# Patient Record
Sex: FEMALE | Race: WHITE | NOT HISPANIC OR LATINO | Employment: PART TIME | URBAN - METROPOLITAN AREA
[De-identification: names, ages, dates, MRNs, and addresses within clinical notes are randomized per-mention and may not be internally consistent; named-entity substitution may affect disease eponyms.]

---

## 2017-03-29 ENCOUNTER — OFFICE VISIT (OUTPATIENT)
Dept: MEDICAL GROUP | Age: 22
End: 2017-03-29
Payer: OTHER GOVERNMENT

## 2017-03-29 VITALS
HEIGHT: 67 IN | WEIGHT: 146.8 LBS | TEMPERATURE: 97.5 F | SYSTOLIC BLOOD PRESSURE: 100 MMHG | DIASTOLIC BLOOD PRESSURE: 64 MMHG | OXYGEN SATURATION: 97 % | BODY MASS INDEX: 23.04 KG/M2 | HEART RATE: 93 BPM

## 2017-03-29 DIAGNOSIS — Z23 NEED FOR MENINGOCOCCAL VACCINATION: ICD-10-CM

## 2017-03-29 DIAGNOSIS — Z13.29 SCREENING FOR ENDOCRINE DISORDER: ICD-10-CM

## 2017-03-29 DIAGNOSIS — R09.81 NASAL CONGESTION: ICD-10-CM

## 2017-03-29 DIAGNOSIS — Z13.1 SCREENING FOR DIABETES MELLITUS: ICD-10-CM

## 2017-03-29 DIAGNOSIS — Z77.21 EXPOSURE TO BLOOD: ICD-10-CM

## 2017-03-29 DIAGNOSIS — R10.9 ABDOMINAL DISCOMFORT: ICD-10-CM

## 2017-03-29 DIAGNOSIS — Z83.3 FAMILY HISTORY OF DIABETES MELLITUS (DM): ICD-10-CM

## 2017-03-29 DIAGNOSIS — Z83.42 FAMILY HISTORY OF HYPERCHOLESTEROLEMIA: ICD-10-CM

## 2017-03-29 DIAGNOSIS — Z13.220 SCREENING, LIPID: ICD-10-CM

## 2017-03-29 PROCEDURE — 99214 OFFICE O/P EST MOD 30 MIN: CPT | Mod: 25 | Performed by: FAMILY MEDICINE

## 2017-03-29 PROCEDURE — 90621 MENB-FHBP VACC 2/3 DOSE IM: CPT | Performed by: FAMILY MEDICINE

## 2017-03-29 PROCEDURE — 90471 IMMUNIZATION ADMIN: CPT | Performed by: FAMILY MEDICINE

## 2017-03-29 NOTE — PROGRESS NOTES
"SUBJECTIVE:        Chief Complaint   Patient presents with   • Orders Needed     Blood work ordered       HPI:     Michel Angelo is a 21 y.o. female here for request of lab work. She is here with her sister.  States her mother and father recently had some lab tests and were noted to have high cholesterol. States she was advised to have her lab tests are completed due to a significant family history. Notes having a family history of high cholesterol as well as diabetes.    States she also had some stomach issues for which she saw urgent care. She later saw GI and was recommended to go on a gluten-free diet. Initially had stomach pains that improved after going on the gluten-free diet. States she felt like she was going to vomit with eating. No longer having any significant symptoms.    She is also concerned about a prior blood exposure in July 2016. Denies being sexually active. States she had some blood on her hands and would like some blood tests ordered as she has been concerned. States her boyfriend denied any specific type of disease which she should be concerned about.  She has been otherwise healthy without any significant symptoms.    States she has woken up congested in the mornings for the past few months but does improve throughout the day.    States that she had some prior lab completed as her brother had a tumor.    ROS:  Denies any recent fevers or chills. No nausea or vomiting. No diarrhea. No chest pains or shortness of breath. No lower extremity edema.    Current Outpatient Prescriptions on File Prior to Visit   Medication Sig Dispense Refill   • Probiotic Product (PROBIOTIC DAILY PO) Take  by mouth.       No current facility-administered medications on file prior to visit.       Allergies   Allergen Reactions   • Gluten Meal        Past Medical History   Diagnosis Date   • NEGATIVE HISTORY OF          OBJECTIVE:   /64 mmHg  Pulse 93  Temp(Src) 36.4 °C (97.5 °F)  Ht 1.702 m (5' 7.01\")  " Wt 66.588 kg (146 lb 12.8 oz)  BMI 22.99 kg/m2  SpO2 97%  LMP 03/16/2017  Breastfeeding? No  General: Well-developed well-nourished female, no acute distress  HEENT: eyes clear, nose clear  Neck: supple, no lymphadenopathy- cervical or supraclavicular, no thyromegaly  Cardiovascular: regular rate and rhythm, no murmurs, gallops, rubs  Lungs: clear to auscultation bilaterally, no wheezes, crackles, or rhonchi  Abdomen: +bowel sounds, soft, nontender, nondistended, no rebound, no guarding, no hepatosplenomegaly  Extremities: no cyanosis, clubbing, edema  Skin: Warm and dry  Psych: appropriate mood and affect        ASSESSMENT/PLAN:    21 year old female.     1. Abdominal discomfort-improved on gluten-free diet. Has seen GI. Stable overall. Monitor and follow up as needed.     2. Nasal congestion- may be associated with allergies. Notices mostly in the morning. Consider some reflux. Continue to modify diet. May try OTC nasal sprays as needed.     3. Exposure to blood  CBC WITH DIFFERENTIAL    HIV ANTIBODIES    HEP C VIRUS ANTIBODY    HEP B SURFACE ANTIGEN    HEP B SURFACE AB   4. Screening, lipid  LIPID PROFILE   5. Screening for diabetes mellitus  COMP METABOLIC PANEL   6. Screening for endocrine disorder  TSH   7. Need for meningococcal vaccination  MENINGOCOCCAL VACCINE (IM) GROUP B   8. Family history of hypercholesterolemia     9. Family history of diabetes mellitus (DM)     Counseled on appropriate diet-recommend low cholesterol, high fiber, and low sugar diet. Recommended routine exercise.   Recommend routine lab work.     Return if symptoms worsen or fail to improve.   Recommend follow-up for routine annual well exam.  Recommend start Pap smear age 23-year-old and screen STD when becomes sexually active.    > 25 minutes spent with this patient of which > 15 minutes spent on counseling and coordination of care.

## 2017-03-29 NOTE — MR AVS SNAPSHOT
"        Michel Angelo   3/29/2017 3:40 PM   Office Visit   MRN: [id number]    Department:  [address] [place] [address]   Dept Phone:  [phone]    Description:  Female : 1995   Provider:  Gisselle Mcneil M.D.           Reason for Visit     Orders Needed Blood work ordered      Allergies as of 3/29/2017     Allergen Noted Reactions    Gluten Meal 2017         You were diagnosed with     Neuritis   [022916]       Exposure to blood   [449701]       Screening, lipid   [930197]       Screening for diabetes mellitus   [V77.1.ICD-9-CM]       Screening for endocrine disorder   [5858973]         Vital Signs     Blood Pressure Pulse Temperature Height Weight Body Mass Index    100/64 mmHg 93 36.4 °C (97.5 °F) 1.702 m (5' 7.01\") 66.588 kg (146 lb 12.8 oz) 22.99 kg/m2    Oxygen Saturation Last Menstrual Period Breastfeeding? Smoking Status          97% 2017 No Never Smoker         Basic Information     Date Of Birth Sex Race Ethnicity Preferred Language    1995 Female White Non- English      Your appointments     2017  4:45 PM   Non Provider 1 with [facility] [address])    [address] [place] [id number] [phone]           You will be receiving a confirmation call a few days before your appointment from our automated call confirmation system.              Problem List              ICD-10-CM Priority Class Noted - Resolved    No active medical problems [id number]   2012 - Present    Dysmenorrhea N94.6   2016 - Present    Family history of carcinoid tumor Z80.9   2016 - Present    Chronic constipation K59.09   2016 - Present    Neuritis- right scalp cutaneous x 5 days M79.2   2016 - Present      Health Maintenance        Date Due Completion Dates    IMM MENINGOCOCCAL VACCINE (MCV4) (1 of 1) 10/17/2013 ---    IMM DTaP/Tdap/Td Vaccine (6 - Td) 2020, 3/31/2004, 6/15/1999, 1998, 1998            "   Current Immunizations     Dtap Vaccine 3/31/2004, 6/15/1999, 2/25/1998, 1/16/1998    HPV Quadrivalent Vaccine (GARDASIL) 6/25/2013, 2/11/2013, 11/29/2012 11:40 AM    Hepatitis A Vaccine, Ped/Adol 5/25/2010, 3/31/2004    Hepatitis B Vaccine Non-Recombivax (Ped/Adol) 6/15/1999, 2/25/1998, 1995    Hib Vaccine (Prp-d) Historical Data 6/15/1999, 2/25/1998, 1/16/1998    IPV 3/31/2004, 6/15/1999, 2/25/1998, 1/16/1998    Influenza TIV (IM) 3/4/2016    Influenza Vaccine Quad Inj (Pf) 10/13/2016    MMR Vaccine 3/31/2004, 6/15/1999    Meningococcal Vaccine Serogroup B (Trumenba)  Incomplete    Tdap Vaccine 5/25/2010    Varicella Vaccine Live 6/15/1999      Below and/or attached are the medications your provider expects you to take. Review all of your home medications and newly ordered medications with your provider and/or pharmacist. Follow medication instructions as directed by your provider and/or pharmacist. Please keep your medication list with you and share with your provider. Update the information when medications are discontinued, doses are changed, or new medications (including over-the-counter products) are added; and carry medication information at all times in the event of emergency situations     Allergies:  GLUTEN MEAL - (reactions not documented)               Medications  Valid as of: March 29, 2017 -  4:51 PM    Generic Name Brand Name Tablet Size Instructions for use    Probiotic Product   Take  by mouth.        .                 Medicines prescribed today were sent to:     [facility] [address] [place] [address] (N), [place] - [address] [facility]    [address] [facility] (N) [place] [address]    Phone: [phone] Fax: [phone]    Open 24 Hours?: No      Medication refill instructions:       If your prescription bottle indicates you have medication refills left, it is not necessary to call your provider’s office. Please contact your pharmacy and they will refill your medication.    If your prescription  bottle indicates you do not have any refills left, you may request refills at any time through one of the following ways: The online [employer] system (except Urgent Care), by calling your provider’s office, or by asking your pharmacy to contact your provider’s office with a refill request. Medication refills are processed only during regular business hours and may not be available until the next business day. Your provider may request additional information or to have a follow-up visit with you prior to refilling your medication.   *Please Note: Medication refills are assigned a new Rx number when refilled electronically. Your pharmacy may indicate that no refills were authorized even though a new prescription for the same medication is available at the pharmacy. Please request the medicine by name with the pharmacy before contacting your provider for a refill.        Your To Do List     Future Labs/Procedures Complete By Expires    CBC WITH DIFFERENTIAL  As directed 3/29/2018    COMP METABOLIC PANEL  As directed 9/26/2017    HEP B SURFACE AB  As directed 9/26/2017    HEP B SURFACE ANTIGEN  As directed 3/29/2018    HEP C VIRUS ANTIBODY  As directed 3/29/2018    HIV ANTIBODIES  As directed 9/27/2017    LIPID PROFILE  As directed 9/26/2017    TSH  As directed 3/29/2018         [employer] Access Code: Activation code not generated  Current [employer] Status: Active

## 2017-03-30 PROBLEM — Z83.42 FAMILY HISTORY OF HYPERCHOLESTEROLEMIA: Status: ACTIVE | Noted: 2017-03-30

## 2017-03-30 PROBLEM — Z83.3 FAMILY HISTORY OF DIABETES MELLITUS (DM): Status: ACTIVE | Noted: 2017-03-30

## 2017-04-03 ENCOUNTER — HOSPITAL ENCOUNTER (OUTPATIENT)
Dept: LAB | Facility: MEDICAL CENTER | Age: 22
End: 2017-04-03
Attending: FAMILY MEDICINE
Payer: OTHER GOVERNMENT

## 2017-04-03 DIAGNOSIS — Z13.29 SCREENING FOR ENDOCRINE DISORDER: ICD-10-CM

## 2017-04-03 DIAGNOSIS — Z13.220 SCREENING, LIPID: ICD-10-CM

## 2017-04-03 DIAGNOSIS — Z13.1 SCREENING FOR DIABETES MELLITUS: ICD-10-CM

## 2017-04-03 DIAGNOSIS — Z77.21 EXPOSURE TO BLOOD: ICD-10-CM

## 2017-04-03 LAB
ALBUMIN SERPL BCP-MCNC: 4.8 G/DL (ref 3.2–4.9)
ALBUMIN/GLOB SERPL: 1.7 G/DL
ALP SERPL-CCNC: 47 U/L (ref 30–99)
ALT SERPL-CCNC: 15 U/L (ref 2–50)
ANION GAP SERPL CALC-SCNC: 4 MMOL/L (ref 0–11.9)
AST SERPL-CCNC: 21 U/L (ref 12–45)
BASOPHILS # BLD AUTO: 0.9 % (ref 0–1.8)
BASOPHILS # BLD: 0.05 K/UL (ref 0–0.12)
BILIRUB SERPL-MCNC: 0.5 MG/DL (ref 0.1–1.5)
BUN SERPL-MCNC: 21 MG/DL (ref 8–22)
CALCIUM SERPL-MCNC: 9.9 MG/DL (ref 8.5–10.5)
CHLORIDE SERPL-SCNC: 103 MMOL/L (ref 96–112)
CHOLEST SERPL-MCNC: 165 MG/DL (ref 100–199)
CO2 SERPL-SCNC: 29 MMOL/L (ref 20–33)
CREAT SERPL-MCNC: 0.97 MG/DL (ref 0.5–1.4)
EOSINOPHIL # BLD AUTO: 0.07 K/UL (ref 0–0.51)
EOSINOPHIL NFR BLD: 1.3 % (ref 0–6.9)
ERYTHROCYTE [DISTWIDTH] IN BLOOD BY AUTOMATED COUNT: 39.8 FL (ref 35.9–50)
GFR SERPL CREATININE-BSD FRML MDRD: >60 ML/MIN/1.73 M 2
GLOBULIN SER CALC-MCNC: 2.9 G/DL (ref 1.9–3.5)
GLUCOSE SERPL-MCNC: 86 MG/DL (ref 65–99)
HBV SURFACE AB SERPL IA-ACNC: <3.1 MIU/ML (ref 0–10)
HBV SURFACE AG SER QL: NEGATIVE
HCT VFR BLD AUTO: 44.1 % (ref 37–47)
HCV AB SER QL: NEGATIVE
HDLC SERPL-MCNC: 49 MG/DL
HGB BLD-MCNC: 14.7 G/DL (ref 12–16)
HIV 1+2 AB+HIV1 P24 AG SERPL QL IA: NON REACTIVE
IMM GRANULOCYTES # BLD AUTO: 0.01 K/UL (ref 0–0.11)
IMM GRANULOCYTES NFR BLD AUTO: 0.2 % (ref 0–0.9)
LDLC SERPL CALC-MCNC: 102 MG/DL
LYMPHOCYTES # BLD AUTO: 2.28 K/UL (ref 1–4.8)
LYMPHOCYTES NFR BLD: 42.5 % (ref 22–41)
MCH RBC QN AUTO: 29.8 PG (ref 27–33)
MCHC RBC AUTO-ENTMCNC: 33.3 G/DL (ref 33.6–35)
MCV RBC AUTO: 89.3 FL (ref 81.4–97.8)
MONOCYTES # BLD AUTO: 0.35 K/UL (ref 0–0.85)
MONOCYTES NFR BLD AUTO: 6.5 % (ref 0–13.4)
NEUTROPHILS # BLD AUTO: 2.61 K/UL (ref 2–7.15)
NEUTROPHILS NFR BLD: 48.6 % (ref 44–72)
NRBC # BLD AUTO: 0 K/UL
NRBC BLD AUTO-RTO: 0 /100 WBC
PLATELET # BLD AUTO: 226 K/UL (ref 164–446)
PMV BLD AUTO: 12.8 FL (ref 9–12.9)
POTASSIUM SERPL-SCNC: 4.4 MMOL/L (ref 3.6–5.5)
PROT SERPL-MCNC: 7.7 G/DL (ref 6–8.2)
RBC # BLD AUTO: 4.94 M/UL (ref 4.2–5.4)
SODIUM SERPL-SCNC: 136 MMOL/L (ref 135–145)
TRIGL SERPL-MCNC: 72 MG/DL (ref 0–149)
TSH SERPL DL<=0.005 MIU/L-ACNC: 2.09 UIU/ML (ref 0.3–3.7)
WBC # BLD AUTO: 5.4 K/UL (ref 4.8–10.8)

## 2017-04-03 PROCEDURE — 87340 HEPATITIS B SURFACE AG IA: CPT

## 2017-04-03 PROCEDURE — 84443 ASSAY THYROID STIM HORMONE: CPT

## 2017-04-03 PROCEDURE — 80061 LIPID PANEL: CPT

## 2017-04-03 PROCEDURE — 36415 COLL VENOUS BLD VENIPUNCTURE: CPT

## 2017-04-03 PROCEDURE — 86706 HEP B SURFACE ANTIBODY: CPT

## 2017-04-03 PROCEDURE — 87389 HIV-1 AG W/HIV-1&-2 AB AG IA: CPT

## 2017-04-03 PROCEDURE — 80053 COMPREHEN METABOLIC PANEL: CPT

## 2017-04-03 PROCEDURE — 85025 COMPLETE CBC W/AUTO DIFF WBC: CPT

## 2017-04-03 PROCEDURE — 86803 HEPATITIS C AB TEST: CPT

## 2017-06-01 ENCOUNTER — TELEPHONE (OUTPATIENT)
Dept: MEDICAL GROUP | Age: 22
End: 2017-06-01

## 2017-06-01 DIAGNOSIS — Z23 NEED FOR VACCINATION: Primary | ICD-10-CM

## 2017-06-14 ENCOUNTER — NON-PROVIDER VISIT (OUTPATIENT)
Dept: MEDICAL GROUP | Age: 22
End: 2017-06-14
Payer: OTHER GOVERNMENT

## 2017-06-14 DIAGNOSIS — Z23 NEED FOR VACCINATION: ICD-10-CM

## 2017-06-14 PROCEDURE — 90471 IMMUNIZATION ADMIN: CPT | Performed by: FAMILY MEDICINE

## 2017-06-14 PROCEDURE — 90621 MENB-FHBP VACC 2/3 DOSE IM: CPT | Performed by: FAMILY MEDICINE

## 2017-06-14 NOTE — PROGRESS NOTES
"Michel Angelo is a 21 y.o. female here for a non-provider visit for:   TRUMENBA (Men B) 2 of 3    Reason for immunization: Overdue/Provider Recommended  Immunization records indicate need for vaccine: Yes, confirmed with Epic and confirmed with [place] [facility]  Minimum interval has been met for this vaccine: Yes  ABN completed: Not Indicated    Order and dose verified by: RICHARD  VIS Dated  08/09/2016 was given to patient: Yes  All IAC Questionnaire questions were answered “No.\"    Patient tolerated injection and no adverse effects were observed or reported: Yes    Pt scheduled for next dose in series: No    "

## 2017-06-14 NOTE — MR AVS SNAPSHOT
Michel Angelo   2017 9:15 AM   Non-Provider Visit   MRN: [id number]    Department:  [address] [place] [address] [facility]   Dept Phone:  [phone]    Description:  Female : 1995   Provider:  DAMIEN WAITE MA           Reason for Visit     Immunizations Trumenba #2      Allergies as of 2017     Allergen Noted Reactions    Gluten Meal 2017         You were diagnosed with     Need for vaccination   [[id number]]         Vital Signs     Smoking Status                   Never Smoker            Basic Information     Date Of Birth Sex Race Ethnicity Preferred Language    1995 Female White Non- English      Your appointments     2017  9:15 AM   Non Provider 1 with DAMIEN WAITE MA   [facility] [address] [place] [address] ([facility])    [address] [place] [address] [phone]           You will be receiving a confirmation call a few days before your appointment from our automated call confirmation system.              Problem List              ICD-10-CM Priority Class Noted - Resolved    No active medical problems [id number]   2012 - Present    Dysmenorrhea N94.6   2016 - Present    Family history of carcinoid tumor Z80.9   2016 - Present    Chronic constipation K59.09   2016 - Present    Neuritis- right scalp cutaneous x 5 days M79.2   2016 - Present    Family history of hypercholesterolemia Z83.42   3/30/2017 - Present    Family history of diabetes mellitus (DM) Z83.3   3/30/2017 - Present      Health Maintenance        Date Due Completion Dates    IMM DTaP/Tdap/Td Vaccine (6 - Td) 2020, 3/31/2004, 6/15/1999, 1998, 1998            Current Immunizations     Dtap Vaccine 3/31/2004, 6/15/1999, 1998, 1998    HPV Quadrivalent Vaccine (GARDASIL) 2013, 2013, 2012 11:40 AM    Hepatitis A Vaccine, Ped/Adol 2010, 3/31/2004    Hepatitis B Vaccine Non-Recombivax (Ped/Adol) 6/15/1999, 1998, 1995    Hib  Vaccine (Prp-d) Historical Data 6/15/1999, 2/25/1998, 1/16/1998    IPV 3/31/2004, 6/15/1999, 2/25/1998, 1/16/1998    Influenza TIV (IM) 3/4/2016    Influenza Vaccine Quad Inj (Pf) 10/13/2016    MMR Vaccine 3/31/2004, 6/15/1999    Meningococcal Conjugate Vaccine MCV4 (Menactra) 10/13/2016    Meningococcal Vaccine Serogroup B (Trumenba) 6/14/2017  9:00 AM, 3/29/2017  5:00 PM    Tdap Vaccine 5/25/2010    Varicella Vaccine Live 6/15/1999      Below and/or attached are the medications your provider expects you to take. Review all of your home medications and newly ordered medications with your provider and/or pharmacist. Follow medication instructions as directed by your provider and/or pharmacist. Please keep your medication list with you and share with your provider. Update the information when medications are discontinued, doses are changed, or new medications (including over-the-counter products) are added; and carry medication information at all times in the event of emergency situations     Allergies:  GLUTEN MEAL - (reactions not documented)               Medications  Valid as of: June 14, 2017 -  9:04 AM    Generic Name Brand Name Tablet Size Instructions for use    Probiotic Product   Take  by mouth.        .                 Medicines prescribed today were sent to:     [address] (N), [address] (N) [place] [address]    Phone: [phone] Fax: [phone]    Open 24 Hours?: No      Medication refill instructions:       If your prescription bottle indicates you have medication refills left, it is not necessary to call your provider’s office. Please contact your pharmacy and they will refill your medication.    If your prescription bottle indicates you do not have any refills left, you may request refills at any time through one of the following ways: The online [employer] system (except Urgent Care), by calling your provider’s office, or by asking  your pharmacy to contact your provider’s office with a refill request. Medication refills are processed only during regular business hours and may not be available until the next business day. Your provider may request additional information or to have a follow-up visit with you prior to refilling your medication.   *Please Note: Medication refills are assigned a new Rx number when refilled electronically. Your pharmacy may indicate that no refills were authorized even though a new prescription for the same medication is available at the pharmacy. Please request the medicine by name with the pharmacy before contacting your provider for a refill.           [employer] Access Code: Activation code not generated  Current [email or web address] Status: Active

## 2017-10-28 ENCOUNTER — NON-PROVIDER VISIT (OUTPATIENT)
Dept: URGENT CARE | Facility: CLINIC | Age: 22
End: 2017-10-28

## 2017-10-28 DIAGNOSIS — Z11.1 PPD SCREENING TEST: ICD-10-CM

## 2017-10-28 PROCEDURE — 86580 TB INTRADERMAL TEST: CPT | Performed by: NURSE PRACTITIONER

## 2017-10-30 ENCOUNTER — NON-PROVIDER VISIT (OUTPATIENT)
Dept: URGENT CARE | Facility: CLINIC | Age: 22
End: 2017-10-30
Payer: OTHER GOVERNMENT

## 2017-10-30 DIAGNOSIS — Z11.1 PPD SCREENING TEST: ICD-10-CM

## 2017-10-30 LAB — TB WHEAL 3D P 5 TU DIAM: NORMAL MM

## 2017-11-28 ENCOUNTER — OFFICE VISIT (OUTPATIENT)
Dept: URGENT CARE | Facility: CLINIC | Age: 22
End: 2017-11-28
Payer: OTHER GOVERNMENT

## 2017-11-28 ENCOUNTER — HOSPITAL ENCOUNTER (OUTPATIENT)
Facility: MEDICAL CENTER | Age: 22
End: 2017-11-28
Attending: NURSE PRACTITIONER
Payer: OTHER GOVERNMENT

## 2017-11-28 VITALS
SYSTOLIC BLOOD PRESSURE: 110 MMHG | TEMPERATURE: 98 F | OXYGEN SATURATION: 96 % | HEART RATE: 88 BPM | WEIGHT: 152 LBS | DIASTOLIC BLOOD PRESSURE: 70 MMHG | RESPIRATION RATE: 20 BRPM | BODY MASS INDEX: 23.86 KG/M2 | HEIGHT: 67 IN

## 2017-11-28 DIAGNOSIS — N30.90 CYSTITIS: ICD-10-CM

## 2017-11-28 DIAGNOSIS — N30.00 ACUTE CYSTITIS WITHOUT HEMATURIA: ICD-10-CM

## 2017-11-28 DIAGNOSIS — R30.0 DYSURIA: ICD-10-CM

## 2017-11-28 LAB
APPEARANCE UR: CLEAR
BILIRUB UR STRIP-MCNC: NORMAL MG/DL
COLOR UR AUTO: YELLOW
GLUCOSE UR STRIP.AUTO-MCNC: NORMAL MG/DL
KETONES UR STRIP.AUTO-MCNC: NORMAL MG/DL
LEUKOCYTE ESTERASE UR QL STRIP.AUTO: NORMAL
NITRITE UR QL STRIP.AUTO: NORMAL
PH UR STRIP.AUTO: 7 [PH] (ref 5–8)
PROT UR QL STRIP: NORMAL MG/DL
RBC UR QL AUTO: NORMAL
SP GR UR STRIP.AUTO: 1
UROBILINOGEN UR STRIP-MCNC: NORMAL MG/DL

## 2017-11-28 PROCEDURE — 87086 URINE CULTURE/COLONY COUNT: CPT

## 2017-11-28 PROCEDURE — 81002 URINALYSIS NONAUTO W/O SCOPE: CPT | Performed by: NURSE PRACTITIONER

## 2017-11-28 PROCEDURE — 99214 OFFICE O/P EST MOD 30 MIN: CPT | Performed by: NURSE PRACTITIONER

## 2017-11-28 RX ORDER — NITROFURANTOIN 25; 75 MG/1; MG/1
100 CAPSULE ORAL EVERY 12 HOURS
Qty: 10 CAP | Refills: 0 | Status: SHIPPED | OUTPATIENT
Start: 2017-11-28 | End: 2017-12-03

## 2017-11-28 ASSESSMENT — ENCOUNTER SYMPTOMS
CONSTITUTIONAL NEGATIVE: 1
MYALGIAS: 0
FLANK PAIN: 0
CHILLS: 0
NAUSEA: 0
FEVER: 0

## 2017-11-29 DIAGNOSIS — R30.0 DYSURIA: ICD-10-CM

## 2017-11-29 NOTE — PROGRESS NOTES
Subjective:      Michel Angelo is a 22 y.o. female who presents with Dysuria (X 6 days urinary pain and lower back pain .)    Past Medical History:   Diagnosis Date   • NEGATIVE HISTORY OF      Social History     Social History   • Marital status: Single     Spouse name: N/A   • Number of children: N/A   • Years of education: N/A     Occupational History   • [employer]- 10th grade Child     Social History Main Topics   • Smoking status: Never Smoker   • Smokeless tobacco: Never Used   • Alcohol use No   • Drug use: No   • Sexual activity: No     Other Topics Concern   • Not on file     Social History Narrative    Has 3 sisters. 1 brother.              Family History   Problem Relation Age of Onset   • Hyperlipidemia Mother    • Hyperlipidemia Father    • Cancer Brother 14     adenocarcinoma- lung, carcinoid- appendix   • Genitourinary () Other      cousin   • Diabetes Maternal Grandmother        Allergies: Gluten meal    This patient is 22-year-old female who presents today with a complaint of dysuria, urgency, and frequency. Symptoms started over the last 5 days. She denies any fever, aches, or chills. States she's had general malaise today. No nausea or vomiting. Patient vehemently denies being sexually active.        Dysuria    This is a new problem. The current episode started in the past 7 days. The problem occurs intermittently. The problem has been unchanged. The pain is mild. There has been no fever. Associated symptoms include frequency and urgency. Pertinent negatives include no chills, flank pain, hematuria or nausea. She has tried nothing for the symptoms. The treatment provided no relief.       Review of Systems   Constitutional: Negative.  Negative for chills, fever and malaise/fatigue.   Gastrointestinal: Negative for nausea.   Genitourinary: Positive for dysuria, frequency and urgency. Negative for flank pain and hematuria.   Musculoskeletal: Negative for myalgias.   Skin: Negative.    All other  "systems reviewed and are negative.         Objective:     /70   Pulse 88   Temp 36.7 °C (98 °F)   Resp 20   Ht 1.702 m (5' 7\")   Wt 68.9 kg (152 lb)   SpO2 96%   BMI 23.81 kg/m²      Physical Exam   Constitutional: She is oriented to person, place, and time. She appears well-developed and well-nourished. No distress.   Cardiovascular: Normal rate and regular rhythm.    Pulmonary/Chest: Effort normal and breath sounds normal.   Abdominal: Soft. Bowel sounds are normal. She exhibits no distension and no mass. There is tenderness. There is no rebound and no guarding. No hernia.   Musculoskeletal: Normal range of motion.   Neurological: She is alert and oriented to person, place, and time.   Skin: Skin is warm and dry. Capillary refill takes less than 2 seconds. She is not diaphoretic.   Psychiatric: She has a normal mood and affect. Her behavior is normal. Judgment and thought content normal.   Vitals reviewed.              Assessment/Plan:     1. Cystitis  - POCT Urinalysis  - nitrofurantoin monohydr macro (MACROBID) 100 MG Cap; Take 1 Cap by mouth every 12 hours for 5 days.  Dispense: 10 Cap; Refill: 0  -Follow up if symptoms persist or worsen  -push fluids  -ER precatuions for fever, flank pain, flu-like symptoms.    2. Acute cystitis without hematuria      3. Dysuria    - Urine Culture; Future      "

## 2017-11-29 NOTE — PATIENT INSTRUCTIONS
Urinary Tract Infection  A urinary tract infection (UTI) can occur any place along the urinary tract. The tract includes the kidneys, ureters, bladder, and urethra. A type of germ called bacteria often causes a UTI. UTIs are often helped with antibiotic medicine.   HOME CARE   · If given, take antibiotics as told by your doctor. Finish them even if you start to feel better.  · Drink enough fluids to keep your pee (urine) clear or pale yellow.  · Avoid tea, drinks with caffeine, and bubbly (carbonated) drinks.  · Pee often. Avoid holding your pee in for a long time.  · Pee before and after having sex (intercourse).  · Wipe from front to back after you poop (bowel movement) if you are a woman. Use each tissue only once.  GET HELP RIGHT AWAY IF:   · You have back pain.  · You have lower belly (abdominal) pain.  · You have chills.  · You feel sick to your stomach (nauseous).  · You throw up (vomit).  · Your burning or discomfort with peeing does not go away.  · You have a fever.  · Your symptoms are not better in 3 days.  MAKE SURE YOU:   · Understand these instructions.  · Will watch your condition.  · Will get help right away if you are not doing well or get worse.     This information is not intended to replace advice given to you by your health care provider. Make sure you discuss any questions you have with your health care provider.     Document Released: 06/05/2009 Document Revised: 01/08/2016 Document Reviewed: 07/18/2013  [employer] Interactive Patient Education ©2016 [employer]

## 2017-12-01 LAB
BACTERIA UR CULT: NORMAL
SIGNIFICANT IND 70042: NORMAL
SOURCE SOURCE: NORMAL

## 2017-12-02 ENCOUNTER — TELEPHONE (OUTPATIENT)
Dept: URGENT CARE | Facility: CLINIC | Age: 22
End: 2017-12-02

## 2017-12-02 NOTE — TELEPHONE ENCOUNTER
Followed up with the patient by phone, urine culture was negative and I have advised her of this. Patient states her symptoms have improved with medication. Therefore I advised her to finish the medication with the understanding that she should follow up with us or with her primary doctor for persistent symptoms. Patient verbalized understanding and agreement.

## 2017-12-21 ENCOUNTER — OFFICE VISIT (OUTPATIENT)
Dept: MEDICAL GROUP | Age: 22
End: 2017-12-21
Payer: OTHER GOVERNMENT

## 2017-12-21 VITALS
BODY MASS INDEX: 25.49 KG/M2 | SYSTOLIC BLOOD PRESSURE: 110 MMHG | WEIGHT: 153 LBS | TEMPERATURE: 98.5 F | DIASTOLIC BLOOD PRESSURE: 68 MMHG | OXYGEN SATURATION: 98 % | HEIGHT: 65 IN | HEART RATE: 100 BPM

## 2017-12-21 DIAGNOSIS — R39.15 URINARY URGENCY: ICD-10-CM

## 2017-12-21 DIAGNOSIS — M53.9 BACK PROBLEM: ICD-10-CM

## 2017-12-21 DIAGNOSIS — N32.9 BLADDER PROBLEM: ICD-10-CM

## 2017-12-21 DIAGNOSIS — R39.11 URINARY HESITANCY: ICD-10-CM

## 2017-12-21 PROCEDURE — 99214 OFFICE O/P EST MOD 30 MIN: CPT | Performed by: FAMILY MEDICINE

## 2017-12-21 ASSESSMENT — PATIENT HEALTH QUESTIONNAIRE - PHQ9: CLINICAL INTERPRETATION OF PHQ2 SCORE: 0

## 2017-12-21 NOTE — PROGRESS NOTES
SUBJECTIVE:      Chief Complaint   Patient presents with   • Cystitis     Urgency to urinate, menstrual cycle was heavierr then usual.        HPI:     Michel Angelo is a 22 y.o. female here for new urinary symptoms. Here with her mother.   3 weeks of symptoms. Feels it may be difficult to start her stream of urine.   Feels pressures in the suprapubic area and urgency to urinate.   Last menstrual cycle was a little different than her usual.   Her cycle usually lasts 4 days normally, but last cycle was 8 days with spotting.   Then a strong odor developed, states it was a fishy type smell that has since improved. Denies abnormal vaginal discharge. Denies every being sexually active. Does not use tampons. States she believes she has previously had a speculum exam.  She has been doing some competitive Neurescue Do. Doing some strenuous activities. Flipping and stretching. She has been taking time off of her Neurescue Do at this time.   Older sister while in law school- had ureter issues, had to have some corrective surgery.    1/2 brother with horseshoe kidney.   Summer time she started cycling and running and started to get irritation.   States she feels mostly pressure and urgency to urinate. Does not know if she is completely emptying.   States she saw [facility] [place] for some lower back issues as well. Denies bowel or bladder incontinence.       ROS:  Denies any recent fevers or chills. No nausea or vomiting. No diarrhea. No chest pains or shortness of breath. No lower extremity edema.    Current Outpatient Prescriptions on File Prior to Visit   Medication Sig Dispense Refill   • Probiotic Product (PROBIOTIC DAILY PO) Take  by mouth.       No current facility-administered medications on file prior to visit.        Allergies   Allergen Reactions   • Gluten Meal        Patient Active Problem List    Diagnosis Date Noted   • Family history of hypercholesterolemia 03/30/2017   • Family history of diabetes mellitus (DM)  "03/30/2017   • Neuritis- right scalp cutaneous x 5 days 09/22/2016   • Dysmenorrhea 04/08/2016   • Family history of carcinoid tumor 04/08/2016   • Chronic constipation 04/08/2016   • No active medical problems 11/29/2012       Past Medical History:   Diagnosis Date   • NEGATIVE HISTORY OF        OBJECTIVE:   /68   Pulse 100   Temp 36.9 °C (98.5 °F)   Ht 1.651 m (5' 5\")   Wt 69.4 kg (153 lb)   LMP 12/11/2017   SpO2 98%   Breastfeeding? No   BMI 25.46 kg/m²   General: Well-developed well-nourished female, no acute distress  Neck: supple, no lymphadenopathy- cervical or supraclavicular, no thyromegaly  Cardiovascular: regular rate and rhythm, no murmurs, gallops, rubs  Lungs: clear to auscultation bilaterally, no wheezes, crackles, or rhonchi  Abdomen: +bowel sounds, soft, nontender, nondistended, no rebound, no guarding, no hepatosplenomegaly. No CVAT.   Back: no spinal TTP  Extremities: no cyanosis, clubbing, edema  Skin: Warm and dry  Psych: appropriate mood and affect  : External genitalia normal. Limited vaginal exam appears normal. Normal appearing urethra, no masses. No abnormal discharge noted. Limited bimanual exam without suprapubic mass noted or tenderness to palpation.       Urine dip: negative.       ASSESSMENT/PLAN:    22 y.o.female    1. Bladder problem- urine dip negative.   -Will evaluate further with u/s, obtain PVR. Lab work as needed. Continue to monitor.  US-RENAL    BASIC METABOLIC PANEL   2. Urinary urgency     3. Urinary hesitancy     4. Back problem- she will have further evaluation with [facility]. Currently stable. Modify activities. Continue to monitor at this time.           Return in about 2 weeks (around 1/4/2018).    This medical record contains text that has been entered with the assistance of computer voice recognition and dictation software.  Therefore, it may contain unintended errors in text, spelling, punctuation, or grammar.                            "

## 2018-01-03 ENCOUNTER — HOSPITAL ENCOUNTER (OUTPATIENT)
Dept: RADIOLOGY | Facility: MEDICAL CENTER | Age: 23
End: 2018-01-03
Attending: FAMILY MEDICINE
Payer: OTHER GOVERNMENT

## 2018-01-03 DIAGNOSIS — N32.9 BLADDER PROBLEM: ICD-10-CM

## 2018-01-03 PROCEDURE — 76775 US EXAM ABDO BACK WALL LIM: CPT

## 2018-01-05 ENCOUNTER — OFFICE VISIT (OUTPATIENT)
Dept: MEDICAL GROUP | Age: 23
End: 2018-01-05
Payer: OTHER GOVERNMENT

## 2018-01-05 ENCOUNTER — HOSPITAL ENCOUNTER (OUTPATIENT)
Facility: MEDICAL CENTER | Age: 23
End: 2018-01-05
Attending: FAMILY MEDICINE

## 2018-01-05 VITALS
HEART RATE: 83 BPM | OXYGEN SATURATION: 99 % | WEIGHT: 153 LBS | HEIGHT: 65 IN | SYSTOLIC BLOOD PRESSURE: 112 MMHG | TEMPERATURE: 98 F | DIASTOLIC BLOOD PRESSURE: 66 MMHG | BODY MASS INDEX: 25.49 KG/M2

## 2018-01-05 DIAGNOSIS — R39.11 URINARY HESITANCY: ICD-10-CM

## 2018-01-05 DIAGNOSIS — N32.9 BLADDER PROBLEM: ICD-10-CM

## 2018-01-05 DIAGNOSIS — R39.15 URINARY URGENCY: ICD-10-CM

## 2018-01-05 LAB
APPEARANCE UR: CLEAR
BILIRUB UR QL STRIP.AUTO: NEGATIVE
COLOR UR: YELLOW
CULTURE IF INDICATED INDCX: NO UA CULTURE
GLUCOSE UR STRIP.AUTO-MCNC: NEGATIVE MG/DL
KETONES UR STRIP.AUTO-MCNC: NEGATIVE MG/DL
LEUKOCYTE ESTERASE UR QL STRIP.AUTO: NEGATIVE
MICRO URNS: NORMAL
NITRITE UR QL STRIP.AUTO: NEGATIVE
PH UR STRIP.AUTO: 6.5 [PH]
PROT UR QL STRIP: NEGATIVE MG/DL
RBC UR QL AUTO: NEGATIVE
SP GR UR STRIP.AUTO: 1.01
UROBILINOGEN UR STRIP.AUTO-MCNC: 0.2 MG/DL

## 2018-01-05 PROCEDURE — 81003 URINALYSIS AUTO W/O SCOPE: CPT

## 2018-01-05 PROCEDURE — 99213 OFFICE O/P EST LOW 20 MIN: CPT | Performed by: FAMILY MEDICINE

## 2018-01-05 RX ORDER — OXYBUTYNIN CHLORIDE 5 MG/1
5 TABLET, EXTENDED RELEASE ORAL DAILY
Qty: 30 TAB | Refills: 3 | Status: SHIPPED | OUTPATIENT
Start: 2018-01-05 | End: 2018-08-15

## 2018-01-05 ASSESSMENT — PAIN SCALES - GENERAL: PAINLEVEL: NO PAIN

## 2018-01-05 NOTE — PROGRESS NOTES
"SUBJECTIVE:      Chief Complaint   Patient presents with   • Results     Ultrasound       HPI:     Michel Angelo is a 22 y.o. female here for follow up of bladder issues. She is here with her mother. She has mostly bladder pressure. States she has symptoms most all the time, but notices even more so around her menstrual period. Denies hematuria or dysuria. She does have urinary urgency and hesitancy.   Has some family history of renal and ureter issues.   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 She is currently also getting her back evaluated by [facility] and will keep me updated.       ROS:  Denies any recent fevers or chills. No nausea or vomiting. No diarrhea. No chest pains or shortness of breath. No lower extremity edema.    Current Outpatient Prescriptions on File Prior to Visit   Medication Sig Dispense Refill   • Probiotic Product (PROBIOTIC DAILY PO) Take  by mouth.       No current facility-administered medications on file prior to visit.        Allergies   Allergen Reactions   • Gluten Meal        Patient Active Problem List    Diagnosis Date Noted   • Family history of hypercholesterolemia 03/30/2017   • Family history of diabetes mellitus (DM) 03/30/2017   • Neuritis- right scalp cutaneous x 5 days 09/22/2016   • Dysmenorrhea 04/08/2016   • Family history of carcinoid tumor 04/08/2016   • Chronic constipation 04/08/2016   • No active medical problems 11/29/2012       Past Medical History:   Diagnosis Date   • NEGATIVE HISTORY OF          OBJECTIVE:   /66   Pulse 83   Temp 36.7 °C (98 °F)   Ht 1.651 m (5' 5\")   Wt 69.4 kg (153 lb)   LMP 12/11/2017   SpO2 99%   BMI 25.46 kg/m²   General: Well-developed well-nourished female, no acute distress  Neck: supple, no lymphadenopathy- cervical or supraclavicular, no " thyromegaly  Cardiovascular: regular rate and rhythm, no murmurs, gallops, rubs  Lungs: clear to auscultation bilaterally, no wheezes, crackles, or rhonchi  Abdomen: +bowel sounds, soft, nontender, nondistended, no rebound, no guarding, no hepatosplenomegaly  Extremities: no cyanosis, clubbing, edema  Skin: Warm and dry  Psych: appropriate mood and affect    Narrative       1/3/2018 1:19 PM    HISTORY/REASON FOR EXAM:  Dysuria.      TECHNIQUE/EXAM DESCRIPTION:  Renal ultrasound.    COMPARISON:  11/8/2013    FINDINGS:  The right kidney measures 11.13 cm.  The left kidney measures 11.22 cm.    There is no hydronephrosis.  There are no abnormal calcifications.    The bladder demonstrates no focal wall abnormality. There is trace debris within the bladder. Prevoid bladder volume is 437 mL. Bilateral ureteral jets are seen. Post void residual is 12.3 mL.     Impression       No evidence of hydronephrosis.    Post void residual 12.3 mL.    Trace dependent debris within the bladder. Correlation with urinalysis recommended.         ASSESSMENT/PLAN:    22 y.o.female      1. Bladder problem- unclear etiology at this time. Recent renal u/s as above, mild debris noted. Trace debris in bladder on u/s. Consider possible lumbar etiology or pelvic floor component. She is a , query that her activities may be contributing. She does have some urinary urgency, thus possible OAB component.   -She will keep me updated about her back evaluation with [facility].    Discussed possible options of evaluation and therapy. Will refer to urology.   Recommend modify activities, trial of kegel exercises and oxybutynin therapy. UA- send to lab.  oxybutynin SR (DITROPAN XL) 5 MG TABLET SR 24 HR    URINALYSIS,CULTURE IF INDICATED    REFERRAL TO UROLOGY   2. Urinary urgency  oxybutynin SR (DITROPAN XL) 5 MG TABLET SR 24 HR    REFERRAL TO UROLOGY   3. Urinary hesitancy  oxybutynin SR (DITROPAN XL) 5 MG TABLET SR 24 HR    REFERRAL TO  UROLOGY       Return if symptoms worsen or fail to improve.    This medical record contains text that has been entered with the assistance of computer voice recognition and dictation software.  Therefore, it may contain unintended errors in text, spelling, punctuation, or grammar.

## 2018-02-02 ENCOUNTER — OFFICE VISIT (OUTPATIENT)
Dept: URGENT CARE | Facility: CLINIC | Age: 23
End: 2018-02-02
Payer: OTHER GOVERNMENT

## 2018-02-02 VITALS
HEART RATE: 103 BPM | DIASTOLIC BLOOD PRESSURE: 88 MMHG | TEMPERATURE: 99.5 F | BODY MASS INDEX: 24.43 KG/M2 | WEIGHT: 152 LBS | SYSTOLIC BLOOD PRESSURE: 120 MMHG | HEIGHT: 66 IN | RESPIRATION RATE: 16 BRPM | OXYGEN SATURATION: 98 %

## 2018-02-02 DIAGNOSIS — H92.03 OTALGIA OF BOTH EARS: ICD-10-CM

## 2018-02-02 DIAGNOSIS — R68.89 FLU-LIKE SYMPTOMS: ICD-10-CM

## 2018-02-02 DIAGNOSIS — J02.9 PHARYNGITIS, UNSPECIFIED ETIOLOGY: ICD-10-CM

## 2018-02-02 DIAGNOSIS — M54.2 NECK PAIN: ICD-10-CM

## 2018-02-02 LAB
FLUAV+FLUBV AG SPEC QL IA: NEGATIVE
INT CON NEG: NORMAL
INT CON NEG: NORMAL
INT CON POS: NORMAL
INT CON POS: NORMAL
S PYO AG THROAT QL: NEGATIVE

## 2018-02-02 PROCEDURE — 87804 INFLUENZA ASSAY W/OPTIC: CPT | Performed by: NURSE PRACTITIONER

## 2018-02-02 PROCEDURE — 87880 STREP A ASSAY W/OPTIC: CPT | Performed by: NURSE PRACTITIONER

## 2018-02-02 PROCEDURE — 99214 OFFICE O/P EST MOD 30 MIN: CPT | Performed by: NURSE PRACTITIONER

## 2018-02-02 RX ORDER — IBUPROFEN 200 MG
600 TABLET ORAL ONCE
Status: COMPLETED | OUTPATIENT
Start: 2018-02-02 | End: 2018-02-02

## 2018-02-02 RX ORDER — OSELTAMIVIR PHOSPHATE 75 MG/1
75 CAPSULE ORAL 2 TIMES DAILY
Qty: 10 CAP | Refills: 0 | Status: SHIPPED | OUTPATIENT
Start: 2018-02-02 | End: 2018-02-07

## 2018-02-02 RX ADMIN — Medication 600 MG: at 12:13

## 2018-02-02 ASSESSMENT — ENCOUNTER SYMPTOMS
FEVER: 1
SWOLLEN GLANDS: 1
SORE THROAT: 1
NECK PAIN: 1
CHILLS: 1
TROUBLE SWALLOWING: 1

## 2018-02-02 ASSESSMENT — PATIENT HEALTH QUESTIONNAIRE - PHQ9: CLINICAL INTERPRETATION OF PHQ2 SCORE: 0

## 2018-02-02 NOTE — PROGRESS NOTES
"Subjective:      Michel Angelo is a 22 y.o. female who presents with Pharyngitis (neck pain, fever, x2days) and Otalgia (both ears)    Past Medical History:   Diagnosis Date   • NEGATIVE HISTORY OF      Social History     Social History   • Marital status: Single     Spouse name: N/A   • Number of children: N/A   • Years of education: N/A     Occupational History   • [employer]- 10th grade Child     Social History Main Topics   • Smoking status: Never Smoker   • Smokeless tobacco: Never Used   • Alcohol use No   • Drug use: No   • Sexual activity: No     Other Topics Concern   • Not on file     Social History Narrative    Has 3 sisters. 1 brother.              Family History   Problem Relation Age of Onset   • Hyperlipidemia Mother    • Hyperlipidemia Father    • Cancer Brother 14     adenocarcinoma- lung, carcinoid- appendix   • Genitourinary () Other      cousin   • Diabetes Maternal Grandmother        Allergies: Gluten meal    Patient is a 22-year-old female who presents today with complaint of fever, aches, chills, and sore throat, and neck pain. Symptoms started yesterday. She denies vomiting or diarrhea. One of her siblings have had similar symptoms.          Pharyngitis    The problem has been gradually worsening. Neither side of throat is experiencing more pain than the other. There has been no fever. The pain is moderate. Associated symptoms include ear pain, neck pain, swollen glands and trouble swallowing. She has tried nothing for the symptoms. The treatment provided no relief.       Review of Systems   Constitutional: Positive for chills, fever and malaise/fatigue.   HENT: Positive for ear pain, sore throat and trouble swallowing.    Musculoskeletal: Positive for neck pain.   Skin: Negative.    All other systems reviewed and are negative.         Objective:     /88   Pulse (!) 103   Temp 37.5 °C (99.5 °F)   Resp 16   Ht 1.676 m (5' 6\")   Wt 68.9 kg (152 lb)   SpO2 98%   Breastfeeding? No "   BMI 24.53 kg/m²      Physical Exam   Constitutional: She is oriented to person, place, and time. She appears well-developed and well-nourished.   HENT:   Head: Normocephalic.   Right Ear: External ear normal.   Left Ear: External ear normal.   Nose: Nose normal.   Mouth/Throat: No oropharyngeal exudate.   Eyes: Conjunctivae and EOM are normal. Pupils are equal, round, and reactive to light. Right eye exhibits no discharge. Left eye exhibits no discharge.   Neck: Normal range of motion. Neck supple. No neck rigidity. No Brudzinski's sign and no Kernig's sign noted.   Cardiovascular: Normal rate and regular rhythm.    Pulmonary/Chest: Effort normal and breath sounds normal.   Musculoskeletal: Normal range of motion.   Neurological: She is alert and oriented to person, place, and time.   Skin: Skin is warm and dry. Capillary refill takes less than 2 seconds.   Psychiatric: She has a normal mood and affect. Her behavior is normal. Judgment and thought content normal.   Vitals reviewed.    POCT influenza: negative     poct strep: negative             Assessment/Plan:     1. Pharyngitis, unspecified etiology    -Warm saltwater gargles  -chloraseptic spray or lozenges       2. Otalgia of both ears    -Alternate tylenol/motrin at home  -ibuprofen 600 mg PO x 1 in office    3. Flu-like symptoms  -ibuprofen 600 mg PO x 1 in office   -Tamiflu  -Alternate tylenol/motrin at home  -push fluids  -ER precautions for respiratory distress  -OTC cough medication of choice   -Follow up for any further questions or concerns      4. Neck pain  -ibuprofen 600 mg PO x 1 in office   -ER precautions for increasing neck pain or stiffness

## 2018-02-03 ENCOUNTER — OFFICE VISIT (OUTPATIENT)
Dept: URGENT CARE | Facility: CLINIC | Age: 23
End: 2018-02-03
Payer: OTHER GOVERNMENT

## 2018-02-03 VITALS
HEART RATE: 104 BPM | SYSTOLIC BLOOD PRESSURE: 100 MMHG | RESPIRATION RATE: 14 BRPM | BODY MASS INDEX: 24.75 KG/M2 | TEMPERATURE: 98.6 F | OXYGEN SATURATION: 96 % | DIASTOLIC BLOOD PRESSURE: 70 MMHG | HEIGHT: 66 IN | WEIGHT: 154 LBS

## 2018-02-03 DIAGNOSIS — H10.023 OTHER MUCOPURULENT CONJUNCTIVITIS OF BOTH EYES: ICD-10-CM

## 2018-02-03 DIAGNOSIS — R68.89 FLU-LIKE SYMPTOMS: ICD-10-CM

## 2018-02-03 DIAGNOSIS — J02.9 PHARYNGITIS, UNSPECIFIED ETIOLOGY: ICD-10-CM

## 2018-02-03 PROCEDURE — 99214 OFFICE O/P EST MOD 30 MIN: CPT | Performed by: NURSE PRACTITIONER

## 2018-02-03 RX ORDER — FLUCONAZOLE 150 MG/1
TABLET ORAL
Qty: 4 TAB | Refills: 0 | Status: SHIPPED | OUTPATIENT
Start: 2018-02-03 | End: 2018-08-15

## 2018-02-03 RX ORDER — POLYMYXIN B SULFATE AND TRIMETHOPRIM 1; 10000 MG/ML; [USP'U]/ML
1 SOLUTION OPHTHALMIC EVERY 4 HOURS
Qty: 10 ML | Refills: 0 | Status: SHIPPED | OUTPATIENT
Start: 2018-02-03 | End: 2018-02-10

## 2018-02-03 RX ORDER — IBUPROFEN 200 MG
200 TABLET ORAL EVERY 6 HOURS PRN
COMMUNITY
End: 2018-11-01

## 2018-02-03 RX ORDER — ACETAMINOPHEN 325 MG/1
650 TABLET ORAL EVERY 4 HOURS PRN
COMMUNITY
End: 2018-11-01

## 2018-02-03 RX ORDER — AMOXICILLIN 500 MG/1
500 CAPSULE ORAL 2 TIMES DAILY
Qty: 20 CAP | Refills: 0 | Status: SHIPPED | OUTPATIENT
Start: 2018-02-03 | End: 2018-02-13

## 2018-02-03 ASSESSMENT — ENCOUNTER SYMPTOMS
FEVER: 1
COUGH: 1
SWOLLEN GLANDS: 1
NECK PAIN: 1
SORE THROAT: 1
MYALGIAS: 1
FATIGUE: 1
EYE REDNESS: 1
DIARRHEA: 1
CHILLS: 1
EYE DISCHARGE: 1

## 2018-02-03 NOTE — PROGRESS NOTES
Subjective:      Michel Angelo is a 22 y.o. female who presents with Follow-Up and Influenza (worse today )    Past Medical History:   Diagnosis Date   • NEGATIVE HISTORY OF      Social History     Social History   • Marital status: Single     Spouse name: N/A   • Number of children: N/A   • Years of education: N/A     Occupational History   • [employer]- 10th grade Child     Social History Main Topics   • Smoking status: Never Smoker   • Smokeless tobacco: Never Used   • Alcohol use No   • Drug use: No   • Sexual activity: No     Other Topics Concern   • Not on file     Social History Narrative    Has 3 sisters. 1 brother.              Family History   Problem Relation Age of Onset   • Hyperlipidemia Mother    • Hyperlipidemia Father    • Cancer Brother 14     adenocarcinoma- lung, carcinoid- appendix   • Genitourinary () Other      cousin   • Diabetes Maternal Grandmother        Allergies: Gluten meal    Patient is a 22-year-old female who was seen yesterday in urgent care and was treated with Tamiflu for flulike symptoms. Patient woke up this morning with her eyes crusted shut with yellow drainage. She was also exposed to pinkeye last week. States she is still having a sore throat and still having aches and flulike symptoms with this. Sore throat is slightly worse. Her neck pain and stiffness has improved from yesterday. She is having about one episode of diarrhea every 24 hours. No abdominal pain or cramping at this time.          Influenza   This is a new problem. The current episode started in the past 7 days. The problem has been gradually improving. Associated symptoms include chills, congestion, coughing, fatigue, a fever, myalgias, neck pain, a sore throat and swollen glands. Nothing aggravates the symptoms. Treatments tried: tamiflu. The treatment provided mild relief.       Review of Systems   Constitutional: Positive for chills, fatigue, fever and malaise/fatigue.   HENT: Positive for congestion and  "sore throat.    Eyes: Positive for discharge and redness.   Respiratory: Positive for cough.    Gastrointestinal: Positive for diarrhea.   Musculoskeletal: Positive for myalgias and neck pain.   Skin: Negative.    All other systems reviewed and are negative.         Objective:     /70   Pulse (!) 104   Temp 37 °C (98.6 °F)   Resp 14   Ht 1.676 m (5' 6\")   Wt 69.9 kg (154 lb)   LMP 01/13/2018   SpO2 96%   Breastfeeding? No   BMI 24.86 kg/m²      Physical Exam   Constitutional: She is oriented to person, place, and time. She appears well-developed and well-nourished.   HENT:   Head: Normocephalic.   Right Ear: External ear normal.   Left Ear: External ear normal.   Mouth/Throat: Oropharynx is clear and moist.   Eyes: EOM are normal. Pupils are equal, round, and reactive to light. Right eye exhibits discharge. Left eye exhibits discharge.   Neck: Normal range of motion. Neck supple.   Cardiovascular: Normal rate and regular rhythm.    Pulmonary/Chest: Effort normal and breath sounds normal.   Abdominal: Soft. Bowel sounds are normal. She exhibits no distension and no mass. There is no tenderness. There is no rebound and no guarding.   Neurological: She is alert and oriented to person, place, and time.   Skin: Capillary refill takes less than 2 seconds.   Psychiatric: She has a normal mood and affect. Her behavior is normal. Judgment and thought content normal.   Vitals reviewed.              Assessment/Plan:     1. Flu-like symptoms    Continue present medications  Continue humidifier    2. Other mucopurulent conjunctivitis of both eyes    - polymixin-trimethoprim (POLYTRIM) 63508-3.1 UNIT/ML-% Solution; Place 1 Drop in both eyes every 4 hours for 7 days.  Dispense: 10 mL; Refill: 0    3. Pharyngitis, unspecified etiology    - amoxicillin (AMOXIL) 500 MG Cap; Take 1 Cap by mouth 2 times a day for 10 days.  Dispense: 20 Cap; Refill: 0; start only for worsening of symptoms    - fluconazole (DIFLUCAN) 150 " MG tablet; Take 1 tablet by mouth weekly until symptoms resolve  Dispense: 4 Tab; Refill: 0    -Follow up if symptoms persist or worsen

## 2018-03-26 ENCOUNTER — HOSPITAL ENCOUNTER (OUTPATIENT)
Dept: LAB | Facility: MEDICAL CENTER | Age: 23
End: 2018-03-26
Attending: FAMILY MEDICINE
Payer: OTHER GOVERNMENT

## 2018-03-26 DIAGNOSIS — Z11.1 SCREENING-PULMONARY TB: ICD-10-CM

## 2018-03-26 PROCEDURE — 36415 COLL VENOUS BLD VENIPUNCTURE: CPT

## 2018-03-26 PROCEDURE — 86480 TB TEST CELL IMMUN MEASURE: CPT

## 2018-03-29 LAB
M TB TUBERC IFN-G BLD QL: NEGATIVE
M TB TUBERC IFN-G/MITOGEN IGNF BLD: 0.19
M TB TUBERC IGNF/MITOGEN IGNF CONTROL: 37.05 [IU]/ML
MITOGEN IGNF BCKGRD COR BLD-ACNC: 0.07 [IU]/ML

## 2018-04-02 DIAGNOSIS — Z01.84 IMMUNITY STATUS TESTING: ICD-10-CM

## 2018-04-17 ENCOUNTER — TELEPHONE (OUTPATIENT)
Dept: OTHER | Facility: IMAGING CENTER | Age: 23
End: 2018-04-17

## 2018-04-17 NOTE — TELEPHONE ENCOUNTER
1. Caller Name: [phone] (home)                                            Call Back Number: [phone] (home)         Patient approves a detailed voicemail message: yes    Patient called and stated that she had her lab work done last week but [facility] for Dr Mcneil and did not know where to send the results. Patient stated that she had her lab work done at [facility] on [facility]

## 2018-04-17 NOTE — TELEPHONE ENCOUNTER
Left voice message for patient to notify her that the results will be available to review once Dr. Mcneil has received the results from [facility]

## 2018-04-17 NOTE — TELEPHONE ENCOUNTER
Phone Number Called: [phone]    Message: Spoke with Antonia at [facility] and provided office fax number for the results.     Left Message for patient to call back: N\A

## 2018-05-27 ENCOUNTER — APPOINTMENT (OUTPATIENT)
Dept: RADIOLOGY | Facility: MEDICAL CENTER | Age: 23
End: 2018-05-27
Attending: EMERGENCY MEDICINE
Payer: OTHER GOVERNMENT

## 2018-05-27 ENCOUNTER — HOSPITAL ENCOUNTER (EMERGENCY)
Facility: MEDICAL CENTER | Age: 23
End: 2018-05-27
Attending: EMERGENCY MEDICINE
Payer: OTHER GOVERNMENT

## 2018-05-27 VITALS
HEART RATE: 68 BPM | SYSTOLIC BLOOD PRESSURE: 110 MMHG | TEMPERATURE: 98.1 F | WEIGHT: 153.66 LBS | RESPIRATION RATE: 18 BRPM | HEIGHT: 66 IN | BODY MASS INDEX: 24.7 KG/M2 | DIASTOLIC BLOOD PRESSURE: 76 MMHG | OXYGEN SATURATION: 99 %

## 2018-05-27 DIAGNOSIS — M79.605 PAIN OF LEFT LOWER EXTREMITY: ICD-10-CM

## 2018-05-27 LAB — EKG IMPRESSION: NORMAL

## 2018-05-27 PROCEDURE — 93005 ELECTROCARDIOGRAM TRACING: CPT

## 2018-05-27 PROCEDURE — 93005 ELECTROCARDIOGRAM TRACING: CPT | Performed by: EMERGENCY MEDICINE

## 2018-05-27 PROCEDURE — 76882 US LMTD JT/FCL EVL NVASC XTR: CPT | Mod: LT

## 2018-05-27 PROCEDURE — 99283 EMERGENCY DEPT VISIT LOW MDM: CPT

## 2018-05-27 ASSESSMENT — PAIN SCALES - GENERAL: PAINLEVEL_OUTOF10: 2

## 2018-05-28 NOTE — ED PROVIDER NOTES
"CHIEF COMPLAINT  Chief Complaint   Patient presents with   • Post-Op Pain       HPI  Michel Angelo is a 22 y.o. female who presents with pain in her left lower extremity.  The patient had an operation scoping her knee 5 days prior.  The patient denies any fevers or chills.  The patient says that the pain has been progressing over the past 2 days.  She denies any numbness or tingling.      REVIEW OF SYSTEMS  See HPI for further details. All other systems are negative.     PAST MEDICAL HISTORY   has a past medical history of NEGATIVE HISTORY OF.    SOCIAL HISTORY  Social History     Social History Main Topics   • Smoking status: Never Smoker   • Smokeless tobacco: Never Used   • Alcohol use No   • Drug use: No   • Sexual activity: No       SURGICAL HISTORY  patient denies any surgical history    CURRENT MEDICATIONS  Home Medications     Reviewed by Nikolay Lyons R.N. (Registered Nurse) on 05/27/18 at 1826  Med List Status: Partial   Medication Last Dose Status   acetaminophen (TYLENOL) 325 MG Tab PRN Active   fluconazole (DIFLUCAN) 150 MG tablet  Active   ibuprofen (MOTRIN) 200 MG Tab PRN Active   oxybutynin SR (DITROPAN XL) 5 MG TABLET SR 24 HR Not taking Active   Probiotic Product (PROBIOTIC DAILY PO) 4/27/2018 Active                ALLERGIES  Allergies   Allergen Reactions   • Gluten Meal        PHYSICAL EXAM  VITAL SIGNS: /76   Pulse 68   Temp 36.7 °C (98.1 °F)   Resp 18   Ht 1.676 m (5' 6\")   Wt 69.7 kg (153 lb 10.6 oz)   SpO2 99%   BMI 24.80 kg/m²    Pulse ox interpretation: I interpret this pulse ox as normal.  Constitutional: Alert in no apparent distress.  HENT: Normocephalic, Atraumatic, Bilateral external ears normal. Nose normal.   Eyes: Pupils are equal and reactive. Conjunctiva normal, non-icteric.   Heart: Regular rate and rythm, no murmurs.    Lungs: Clear to auscultation bilaterally.  Skin: Patient with 3 surgical wounds to her left knee with no surrounding erythema.  The dressings " are clean dry and intact.  The patient has some ecchymosis noted in her lower extremity with some mild calf tenderness.  She has sensation intact light touch over the left foot and has a strong dorsalis pedis pulse  Neurologic: Alert, Grossly non-focal.   Psychiatric: Affect normal, Judgment normal, Mood normal, Appears appropriate and not intoxicated.     US-EXTREMITY NON VASCULAR UNILATERAL LEFT   Final Result         1.  No DVT of the left lower extremity identified            COURSE & MEDICAL DECISION MAKING  Pertinent Labs & Imaging studies reviewed. (See chart for details)    22-year-old female who presents with pain and swelling to her left lower extremity status post orthopedic surgery.  At this time the patient is neurovascularly intact.  I will get an ultrasound of her left lower extremity.  She has no signs or symptoms of infection at this time.    The patient's DVT ultrasound was negative.  I believe this might be related to starting physical therapy recently as well as overuse.  I gave her strict return precautions and told her to use [facility] therapy to help with the pain.    The patient will not drink alcohol nor drive with prescribed medications. The patient will return for worsening symptoms and is stable at the time of discharge. The patient verbalizes understanding and will comply.    FINAL IMPRESSION  1. Pain of left lower extremity              Electronically signed by: Reno Lange, 5/27/2018 6:53 PM

## 2018-05-28 NOTE — DISCHARGE INSTRUCTIONS
Leg Cramps  Introduction  Leg cramps occur when a muscle or muscles tighten and you have no control over this tightening (involuntary muscle contraction). Muscle cramps can develop in any muscle, but the most common place is in the calf muscles of the leg. Those cramps can occur during exercise or when you are at rest. Leg cramps are painful, and they may last for a few seconds to a few minutes. Cramps may return several times before they finally stop.  Usually, leg cramps are not caused by a serious medical problem. In many cases, the cause is not known. Some common causes include:  · Overexertion.  · Overuse from repetitive motions, or doing the same thing over and over.  · Remaining in a certain position for a long period of time.  · Improper preparation, form, or technique while performing a sport or an activity.  · Dehydration.  · Injury.  · Side effects of some medicines.  · Abnormally low levels of the salts and ions in your blood (electrolytes), especially potassium and calcium. These levels could be low if you are taking water pills (diuretics) or if you are pregnant.  Follow these instructions at home:  Watch your condition for any changes. Taking the following actions may help to lessen any discomfort that you are feeling:  · Stay well-hydrated. Drink enough fluid to keep your urine clear or pale yellow.  · Try massaging, stretching, and relaxing the affected muscle. Do this for several minutes at a time.  · For tight or tense muscles, use a warm towel, heating pad, or hot shower water directed to the affected area.  · If you are sore or have pain after a cramp, applying ice to the affected area may relieve discomfort.  ¨ Put ice in a plastic bag.  ¨ Place a towel between your skin and the bag.  ¨ Leave the ice on for 20 minutes, 2-3 times per day.  · Avoid strenuous exercise for several days if you have been having frequent leg cramps.  · Make sure that your diet includes the essential minerals for your  muscles to work normally.  · Take medicines only as directed by your health care provider.  Contact a health care provider if:  · Your leg cramps get more severe or more frequent, or they do not improve over time.  · Your foot becomes cold, numb, or blue.  This information is not intended to replace advice given to you by your health care provider. Make sure you discuss any questions you have with your health care provider.  Document Released: 01/25/2006 Document Revised: 05/25/2017 Document Reviewed: 11/25/2015  © 2017 [employer]vier

## 2018-06-13 ENCOUNTER — EH NON-PROVIDER (OUTPATIENT)
Dept: OCCUPATIONAL MEDICINE | Facility: CLINIC | Age: 23
End: 2018-06-13

## 2018-06-13 DIAGNOSIS — Z02.89 ENCOUNTER FOR OCCUPATIONAL HEALTH ASSESSMENT: ICD-10-CM

## 2018-06-14 ENCOUNTER — OFFICE VISIT (OUTPATIENT)
Dept: OTHER | Facility: IMAGING CENTER | Age: 23
End: 2018-06-14
Payer: OTHER GOVERNMENT

## 2018-06-14 ENCOUNTER — HOSPITAL ENCOUNTER (OUTPATIENT)
Facility: MEDICAL CENTER | Age: 23
End: 2018-06-14
Attending: FAMILY MEDICINE
Payer: OTHER GOVERNMENT

## 2018-06-14 VITALS
DIASTOLIC BLOOD PRESSURE: 68 MMHG | WEIGHT: 151.01 LBS | SYSTOLIC BLOOD PRESSURE: 106 MMHG | HEIGHT: 66 IN | HEART RATE: 112 BPM | BODY MASS INDEX: 24.27 KG/M2 | OXYGEN SATURATION: 99 % | RESPIRATION RATE: 16 BRPM | TEMPERATURE: 101.5 F

## 2018-06-14 DIAGNOSIS — J02.9 SORE THROAT: ICD-10-CM

## 2018-06-14 DIAGNOSIS — R52 BODY ACHES: ICD-10-CM

## 2018-06-14 DIAGNOSIS — R53.83 FATIGUE, UNSPECIFIED TYPE: ICD-10-CM

## 2018-06-14 DIAGNOSIS — B34.9 VIRAL ILLNESS: ICD-10-CM

## 2018-06-14 DIAGNOSIS — R50.9 FEVER, UNSPECIFIED FEVER CAUSE: ICD-10-CM

## 2018-06-14 LAB
FLUAV+FLUBV AG SPEC QL IA: NEGATIVE
HETEROPH AB SER QL LA: NEGATIVE
INT CON NEG: NEGATIVE
INT CON POS: POSITIVE
S PYO AG THROAT QL: NEGATIVE

## 2018-06-14 PROCEDURE — 99213 OFFICE O/P EST LOW 20 MIN: CPT | Performed by: FAMILY MEDICINE

## 2018-06-14 PROCEDURE — 87804 INFLUENZA ASSAY W/OPTIC: CPT | Performed by: FAMILY MEDICINE

## 2018-06-14 PROCEDURE — 87880 STREP A ASSAY W/OPTIC: CPT | Performed by: FAMILY MEDICINE

## 2018-06-14 PROCEDURE — 86308 HETEROPHILE ANTIBODY SCREEN: CPT | Performed by: FAMILY MEDICINE

## 2018-06-14 PROCEDURE — 87070 CULTURE OTHR SPECIMN AEROBIC: CPT

## 2018-06-14 NOTE — PROGRESS NOTES
SUBJECTIVE:      Chief Complaint   Patient presents with   • Pharyngitis     x 1 day    • Chills     x 1 day    • Generalized Body Aches     x 1 day    • Fatigue     x 1 day        HPI:     Michel Angelo is a 22 y.o. female here with complaint of feeling fatigued with symptoms of sore throat, fever, chills, and body aches.   Started this morning with symptoms. Mostly feeling tired with some sore throat at this time.   Took 2 tylenol this morning at 7 am. Temperature today was elevated.   Has had some loose stools which started about 2 days ago as well. No abdominal pain, nausea or vomiting.   Had been to [employer] this weekend, denies drinking lake or stream water.   Denies congestion or cough symptoms. Had some headache symptoms.   No significant neck pain or stiffness.   Has been drinking some water today.   Had a arthroscopic surgery for left knee about 3 weeks ago for plica. No redness or swelling of area noted.   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     ROS:  No nausea or vomiting. No rash. No chest pains or shortness of breath. No lower extremity edema. No dysuria.     Current Outpatient Prescriptions on File Prior to Visit   Medication Sig Dispense Refill   • acetaminophen (TYLENOL) 325 MG Tab Take 650 mg by mouth every four hours as needed.     • ibuprofen (MOTRIN) 200 MG Tab Take 200 mg by mouth every 6 hours as needed.     • fluconazole (DIFLUCAN) 150 MG tablet Take 1 tablet by mouth weekly until symptoms resolve 4 Tab 0   • oxybutynin SR (DITROPAN XL) 5 MG TABLET SR 24 HR Take 1 Tab by mouth every day. 30 Tab 3   • Probiotic Product (PROBIOTIC DAILY PO) Take  by mouth.       No current facility-administered medications on file prior to visit.        Allergies   Allergen Reactions   • Gluten Meal        Patient Active Problem List    Diagnosis Date Noted   • Family history of hypercholesterolemia 03/30/2017   • Family history of  "diabetes mellitus (DM) 03/30/2017   • Neuritis- right scalp cutaneous x 5 days 09/22/2016   • Dysmenorrhea 04/08/2016   • Family history of carcinoid tumor 04/08/2016   • Chronic constipation 04/08/2016   • No active medical problems 11/29/2012       Past Medical History:   Diagnosis Date   • NEGATIVE HISTORY OF          OBJECTIVE:   /68   Pulse (!) 112   Temp (!) 38.6 °C (101.5 °F)   Resp 16   Ht 1.676 m (5' 6\")   Wt 68.5 kg (151 lb 0.2 oz)   LMP 05/28/2018   SpO2 99%   Breastfeeding? No   BMI 24.37 kg/m²   General: Well-developed well-nourished female, no acute distress  HEENT: oropharynx with mild erythema, eyes clear, TMs clear and intact, 1-2 + tonsils bilaterally, left with minimal exudate noted, sinuses nontender, nose clear  Neck: supple, no lymphadenopathy- cervical or supraclavicular, no thyromegaly. No nuchal rigidity  Cardiovascular: regular rate and rhythm, no murmurs, gallops, rubs  Lungs: clear to auscultation bilaterally, no wheezes, crackles, or rhonchi  Abdomen: +bowel sounds, soft, nontender, nondistended, no rebound, no guarding, no hepatosplenomegaly  Extremities: no cyanosis, clubbing, edema  Skin: Warm and dry  Psych: appropriate mood and affect    POC strep: negative.   POC influenza A/B: negative  POC mononucleosis: negative      ASSESSMENT/PLAN:    22 y.o.female    1. Viral illness- symptoms appear due to viral etiology at this time. As below.      2. Fatigue, unspecified type     3. Sore throat  POCT Influenza A/B    POCT Rapid Strep A    CULTURE THROAT   4. Body aches  POCT Mononucleosis (Mono)    POCT Influenza A/B    POCT Rapid Strep A   5.      Fever, unspecified fever cause- likely due to viral etiology.   Discussed supportive care.   Rest, keep well hydrated, and discussed over-the-counter medications to use for symptom relief.  Discussed use of andrographis if desired.   Follow up if no improvement in symptoms in 1-2 weeks, sooner as needed.    This medical record " contains text that has been entered with the assistance of computer voice recognition and dictation software.  Therefore, it may contain unintended errors in text, spelling, punctuation, or grammar.

## 2018-06-14 NOTE — LETTER
June 14, 2018         Patient: Michel Angelo   YOB: 1995   Date of Visit: 6/14/2018           To Whom it May Concern:    Michel Angelo was seen in my clinic on 6/14/2018.  Please excuse her from class for 6/14-15/18 due to illness.     If you have any questions or concerns, please don't hesitate to call.        Sincerely,           Gisselle Mcneil M.D.  Electronically Signed

## 2018-06-15 DIAGNOSIS — J02.9 SORE THROAT: ICD-10-CM

## 2018-06-17 LAB
BACTERIA SPEC RESP CULT: NORMAL
SIGNIFICANT IND 70042: NORMAL
SITE SITE: NORMAL
SOURCE SOURCE: NORMAL

## 2018-06-25 ENCOUNTER — TELEPHONE (OUTPATIENT)
Dept: OTHER | Facility: IMAGING CENTER | Age: 23
End: 2018-06-25

## 2018-06-25 NOTE — TELEPHONE ENCOUNTER
----- Message from Gisselle Mcneil M.D. sent at 6/25/2018 10:23 AM PDT -----  Regarding: FW: follow up from visit  Last mychart message note read and Friday appointment cancelled, please see if patient has improved.   Thank you.     SP  ----- Message -----  From: Gisselle Mcneil M.D.  Sent: 6/22/2018   5:00 AM  To: Michel Angelo  Subject: follow up from visit                             Hi Michel,     How are you doing?   Checking in to see how you are feeling today.     Dr. Mcneil

## 2018-06-26 NOTE — TELEPHONE ENCOUNTER
Pt called back and reports she is feeling well, symptoms have resolved. Does not feel like she needs an appointment at this time.

## 2018-06-29 ENCOUNTER — NON-PROVIDER VISIT (OUTPATIENT)
Dept: OTHER | Facility: IMAGING CENTER | Age: 23
End: 2018-06-29
Payer: OTHER GOVERNMENT

## 2018-06-29 DIAGNOSIS — Z23 NEED FOR VACCINATION: ICD-10-CM

## 2018-06-29 PROCEDURE — 90471 IMMUNIZATION ADMIN: CPT | Performed by: FAMILY MEDICINE

## 2018-06-29 PROCEDURE — 90621 MENB-FHBP VACC 2/3 DOSE IM: CPT | Performed by: FAMILY MEDICINE

## 2018-06-29 NOTE — PROGRESS NOTES
"Michel Angelo is a 22 y.o. female here for a non-provider visit for:   TRUMENBA (Men B) 3 of 3    Reason for immunization: continue or complete series started at the office  Immunization records indicate need for vaccine: Yes, confirmed with [facility]  Minimum interval has been met for this vaccine: Yes  ABN completed: Not Indicated    Order and dose verified by: Mirella PAIZ  VIS Dated  08/09/2016 was given to patient: Yes  All IAC Questionnaire questions were answered \"No.\"    Patient tolerated injection and no adverse effects were observed or reported: Yes    Pt scheduled for next dose in series: Not Indicated    "

## 2018-07-19 ENCOUNTER — OFFICE VISIT (OUTPATIENT)
Dept: OTHER | Facility: IMAGING CENTER | Age: 23
End: 2018-07-19
Payer: OTHER GOVERNMENT

## 2018-07-19 VITALS
HEIGHT: 66 IN | WEIGHT: 151.68 LBS | DIASTOLIC BLOOD PRESSURE: 64 MMHG | OXYGEN SATURATION: 97 % | BODY MASS INDEX: 24.38 KG/M2 | RESPIRATION RATE: 16 BRPM | HEART RATE: 94 BPM | TEMPERATURE: 98.3 F | SYSTOLIC BLOOD PRESSURE: 98 MMHG

## 2018-07-19 DIAGNOSIS — L72.9 FOLLICULAR CYST OF SKIN AND SUBCUTANEOUS TISSUE: ICD-10-CM

## 2018-07-19 DIAGNOSIS — K64.4 ANAL SKIN TAG: ICD-10-CM

## 2018-07-19 PROCEDURE — 99214 OFFICE O/P EST MOD 30 MIN: CPT | Performed by: FAMILY MEDICINE

## 2018-07-19 NOTE — PROGRESS NOTES
SUBJECTIVE:        Chief Complaint   Patient presents with   • Cyst     or ingrown hair on left groin area, swollen and painful, present about 3 days        HPI:     Michel Angelo is a 22 y.o. female here for new symptoms of a bump/irritation of the left groin area which started about 3 days ago.  She does feel it is slightly better today but wanted to have it evaluated.  She has not noticed any significant redness, warmth or drainage from the area.  It does seem to be along her underwear line which may be further irritating it. No fevers/chills.     She also notes she may have a hemorrhoid which is a new issue.  She has noticed a protrusion in the anal region for a while.  Has not noticed any issues with bleeding, itching or irritation of the area.  No black or bloody stools.  Denies any significant symptoms of diarrhea or constipation.  She does not recall having a painful protrusion of the area in the past.     ROS:  Denies any recent fevers or chills. No nausea or vomiting. No diarrhea. No chest pains or shortness of breath. No lower extremity edema.    Current Outpatient Prescriptions on File Prior to Visit   Medication Sig Dispense Refill   • acetaminophen (TYLENOL) 325 MG Tab Take 650 mg by mouth every four hours as needed.     • ibuprofen (MOTRIN) 200 MG Tab Take 200 mg by mouth every 6 hours as needed.     • fluconazole (DIFLUCAN) 150 MG tablet Take 1 tablet by mouth weekly until symptoms resolve 4 Tab 0   • oxybutynin SR (DITROPAN XL) 5 MG TABLET SR 24 HR Take 1 Tab by mouth every day. 30 Tab 3   • Probiotic Product (PROBIOTIC DAILY PO) Take  by mouth.       No current facility-administered medications on file prior to visit.        Allergies   Allergen Reactions   • Gluten Meal        Patient Active Problem List    Diagnosis Date Noted   • Family history of hypercholesterolemia 03/30/2017   • Family history of diabetes mellitus (DM) 03/30/2017   • Neuritis- right scalp cutaneous x 5 days 09/22/2016  "  • Dysmenorrhea 04/08/2016   • Family history of carcinoid tumor 04/08/2016   • Chronic constipation 04/08/2016   • No active medical problems 11/29/2012       Past Medical History:   Diagnosis Date   • NEGATIVE HISTORY OF              OBJECTIVE:   BP (!) 98/64   Pulse 94   Temp 36.8 °C (98.3 °F)   Resp 16   Ht 1.676 m (5' 6\")   Wt 68.8 kg (151 lb 10.8 oz)   LMP 06/23/2018   SpO2 97%   Breastfeeding? No   BMI 24.48 kg/m²   General: Well-developed well-nourished female, no acute distress  Extremities: no cyanosis, clubbing, edema  Skin: Warm and dry. Left groin region- small subcutaneous ~3-4 mm nodule, no significant erythema noted.  Anus: anterior region with protrusion of small skin tag noted, no significant venous hemorrhoid protrusion noted. No erythema, nontender to palpation.    Psych: appropriate mood and affect      ASSESSMENT/PLAN:    22 y.o.female    1. Follicular cyst of skin and subcutaneous tissue- no significant findings of infection on exam today. May have slight inflammation of region which is more sensitive due to the location of her underwear line.  -Recommend use of warm compress of area. Avoid tight clothing or irritating factors. Monitor. Follow up if any worsening symptoms.       2. Anal skin tag- anterior region. Appears benign.   -Discussed hygiene of area and potential options of therapy including removal if desired. Recommend adequate fiber, fluid and importance of regular bowel regimen/habits. May use topical otc products and sitz bath if needed.         Return if symptoms worsen or fail to improve.    This medical record contains text that has been entered with the assistance of computer voice recognition and dictation software.  Therefore, it may contain unintended errors in text, spelling, punctuation, or grammar.                          "

## 2018-07-20 ENCOUNTER — TELEPHONE (OUTPATIENT)
Dept: OTHER | Facility: IMAGING CENTER | Age: 23
End: 2018-07-20

## 2018-08-15 ENCOUNTER — OFFICE VISIT (OUTPATIENT)
Dept: OTHER | Facility: IMAGING CENTER | Age: 23
End: 2018-08-15
Payer: OTHER GOVERNMENT

## 2018-08-15 VITALS
RESPIRATION RATE: 14 BRPM | HEIGHT: 66 IN | HEART RATE: 71 BPM | DIASTOLIC BLOOD PRESSURE: 58 MMHG | OXYGEN SATURATION: 98 % | WEIGHT: 151 LBS | BODY MASS INDEX: 24.27 KG/M2 | TEMPERATURE: 98.3 F | SYSTOLIC BLOOD PRESSURE: 102 MMHG

## 2018-08-15 DIAGNOSIS — Z30.09 GENERAL COUNSELING AND ADVICE ON CONTRACEPTIVE MANAGEMENT: ICD-10-CM

## 2018-08-15 PROCEDURE — 99213 OFFICE O/P EST LOW 20 MIN: CPT | Performed by: FAMILY MEDICINE

## 2018-08-15 NOTE — PROGRESS NOTES
"SUBJECTIVE:      Chief Complaint   Patient presents with   • Contraception       HPI:     Michel Angelo is a 22 y.o. female here for contraceptive counseling.   Recently in a new relationship and using condoms but wants to discuss options for contraception.   Patient's last menstrual period was 08/13/2018.  Recent cycle started at 23 days and seemed just slightly heavier than her usual and was a day late. States she last had intercourse around her ovulation period, but used a condom, no breakage. No significant symptoms of fatigue, hair changes, or weight changes lately.   Maternal grandmother had breast cancer she believes around 50-60 year of age. Concerned about possible hormone exposure due to her family history.     ROS:  Denies any recent fevers or chills. No nausea or vomiting. No diarrhea. No chest pains or shortness of breath. No lower extremity edema.  No abnormal discharge or pelvic pain.     Current Outpatient Prescriptions on File Prior to Visit   Medication Sig Dispense Refill   • acetaminophen (TYLENOL) 325 MG Tab Take 650 mg by mouth every four hours as needed.     • ibuprofen (MOTRIN) 200 MG Tab Take 200 mg by mouth every 6 hours as needed.     • Probiotic Product (PROBIOTIC DAILY PO) Take  by mouth.       No current facility-administered medications on file prior to visit.        Allergies   Allergen Reactions   • Gluten Meal        Patient Active Problem List    Diagnosis Date Noted   • Family history of hypercholesterolemia 03/30/2017   • Family history of diabetes mellitus (DM) 03/30/2017   • Neuritis- right scalp cutaneous x 5 days 09/22/2016   • Dysmenorrhea 04/08/2016   • Family history of carcinoid tumor 04/08/2016   • Chronic constipation 04/08/2016   • No active medical problems 11/29/2012       Past Medical History:   Diagnosis Date   • NEGATIVE HISTORY OF        OBJECTIVE:   /58   Pulse 71   Temp 36.8 °C (98.3 °F)   Resp 14   Ht 1.676 m (5' 6\")   Wt 68.5 kg (151 lb)   LMP " 08/13/2018   SpO2 98%   BMI 24.37 kg/m²   General: Well-developed well-nourished female, no acute distress  Neck: supple, no lymphadenopathy- cervical or supraclavicular, no thyromegaly  Cardiovascular: regular rate and rhythm, no murmurs, gallops, rubs  Lungs: clear to auscultation bilaterally, no wheezes, crackles, or rhonchi  Extremities: no cyanosis, clubbing, edema  Skin: Warm and dry  Psych: appropriate mood and affect      ASSESSMENT/PLAN:    22 y.o.female      1. General counseling and advice on contraceptive management   -Reviewed options of contraceptive therapy as well as risk and benefits. Patient is interested in possible IUD, thus will refer to gynecology.   -Counseled on safe sex practices, recommendation for routine STI screening and start screening pap at age 24 yo. Answered all questions and concerns.  REFERRAL TO GYNECOLOGY         Return if symptoms worsen or fail to improve.    This medical record contains text that has been entered with the assistance of computer voice recognition and dictation software.  Therefore, it may contain unintended errors in text, spelling, punctuation, or grammar.      > 15 minutes spent with this patient, > 10 minutes of time spent on counseling and coordination of care as above, excluding any time for procedures.

## 2018-09-21 ENCOUNTER — OFFICE VISIT (OUTPATIENT)
Dept: URGENT CARE | Facility: CLINIC | Age: 23
End: 2018-09-21
Payer: OTHER GOVERNMENT

## 2018-09-21 VITALS
RESPIRATION RATE: 16 BRPM | WEIGHT: 151 LBS | HEART RATE: 94 BPM | TEMPERATURE: 98.5 F | DIASTOLIC BLOOD PRESSURE: 66 MMHG | HEIGHT: 67 IN | SYSTOLIC BLOOD PRESSURE: 104 MMHG | OXYGEN SATURATION: 100 % | BODY MASS INDEX: 23.7 KG/M2

## 2018-09-21 DIAGNOSIS — J06.9 UPPER RESPIRATORY TRACT INFECTION, UNSPECIFIED TYPE: ICD-10-CM

## 2018-09-21 LAB
INT CON NEG: NEGATIVE
INT CON POS: POSITIVE
S PYO AG THROAT QL: NEGATIVE

## 2018-09-21 PROCEDURE — 87880 STREP A ASSAY W/OPTIC: CPT | Performed by: PHYSICIAN ASSISTANT

## 2018-09-21 PROCEDURE — 99214 OFFICE O/P EST MOD 30 MIN: CPT | Performed by: PHYSICIAN ASSISTANT

## 2018-09-21 ASSESSMENT — ENCOUNTER SYMPTOMS
WHEEZING: 0
SHORTNESS OF BREATH: 0
PALPITATIONS: 0
SORE THROAT: 1
SPUTUM PRODUCTION: 1
HEMOPTYSIS: 0
COUGH: 1
FEVER: 0
CHILLS: 0

## 2018-09-21 NOTE — PROGRESS NOTES
"Subjective:      Michel Angelo is a 22 y.o. female who presents with Pharyngitis (Started yesterday, cough, with green mucus, swollen tonsills, pt. states it also looks yellow in the back of her throat, also headache.)            Pharyngitis    This is a new problem. The current episode started yesterday. The problem has been unchanged. There has been no fever. Associated symptoms include congestion and coughing. Pertinent negatives include no ear pain or shortness of breath. She has tried nothing for the symptoms.       Review of Systems   Constitutional: Positive for malaise/fatigue. Negative for chills and fever.   HENT: Positive for congestion and sore throat. Negative for ear pain.    Respiratory: Positive for cough and sputum production. Negative for hemoptysis, shortness of breath and wheezing.    Cardiovascular: Negative for chest pain and palpitations.   All other systems reviewed and are negative.    PMH:  has a past medical history of NEGATIVE HISTORY OF.  MEDS:   Current Outpatient Prescriptions:   •  acetaminophen (TYLENOL) 325 MG Tab, Take 650 mg by mouth every four hours as needed., Disp: , Rfl:   •  ibuprofen (MOTRIN) 200 MG Tab, Take 200 mg by mouth every 6 hours as needed., Disp: , Rfl:   •  Probiotic Product (PROBIOTIC DAILY PO), Take  by mouth., Disp: , Rfl:   ALLERGIES:   Allergies   Allergen Reactions   • Gluten Meal      SURGHX: History reviewed. No pertinent surgical history.  SOCHX:  reports that she has never smoked. She has never used smokeless tobacco. She reports that she does not drink alcohol or use drugs.  FH: Family history was reviewed, no pertinent findings to report  Medications, Allergies, and current problem list reviewed today in Epic       Objective:     /66 (BP Location: Right arm, Patient Position: Sitting, BP Cuff Size: Adult)   Pulse 94   Temp 36.9 °C (98.5 °F) (Temporal)   Resp 16   Ht 1.702 m (5' 7\")   Wt 68.5 kg (151 lb)   LMP 09/10/2018 (Exact Date)   " SpO2 100%   Breastfeeding? No   BMI 23.65 kg/m²      Physical Exam   Constitutional: She is oriented to person, place, and time. She appears well-developed and well-nourished. She is active.  Non-toxic appearance. She does not have a sickly appearance. She does not appear ill. No distress. She is not intubated.   HENT:   Head: Normocephalic and atraumatic.   Right Ear: Hearing, tympanic membrane, external ear and ear canal normal.   Left Ear: Hearing, tympanic membrane, external ear and ear canal normal.   Nose: Nose normal.   Mouth/Throat: Uvula is midline, oropharynx is clear and moist and mucous membranes are normal.   Eyes: Conjunctivae, EOM and lids are normal.   Neck: Normal range of motion and full passive range of motion without pain. Neck supple.   Cardiovascular: Regular rhythm, S1 normal, S2 normal and normal heart sounds.  Exam reveals no gallop and no friction rub.    No murmur heard.  Pulmonary/Chest: Effort normal and breath sounds normal. No accessory muscle usage. No apnea, no tachypnea and no bradypnea. She is not intubated. No respiratory distress. She has no decreased breath sounds. She has no wheezes. She has no rhonchi. She has no rales. She exhibits no tenderness.   Musculoskeletal: Normal range of motion.   Neurological: She is alert and oriented to person, place, and time.   Skin: Skin is warm and dry.   Psychiatric: She has a normal mood and affect. Her speech is normal and behavior is normal. Judgment and thought content normal.   Vitals reviewed.            Rapid Strep: NEG  Assessment/Plan:     1. Upper respiratory tract infection, unspecified type  - OTC supportive care  - POCT Rapid Strep A    Differential diagnosis, natural history, supportive care discussed. Follow-up with primary care provider within 7-10 days, emergency room precautions discussed.  Patient and/or family appears understanding of information.  Handout and review of patients diagnosis and treatment was discussed  extensively.

## 2018-10-08 ENCOUNTER — HOSPITAL ENCOUNTER (EMERGENCY)
Facility: MEDICAL CENTER | Age: 23
End: 2018-10-08
Payer: OTHER GOVERNMENT

## 2018-10-08 VITALS
RESPIRATION RATE: 18 BRPM | HEIGHT: 67 IN | WEIGHT: 147.93 LBS | TEMPERATURE: 98.2 F | SYSTOLIC BLOOD PRESSURE: 105 MMHG | OXYGEN SATURATION: 100 % | HEART RATE: 79 BPM | DIASTOLIC BLOOD PRESSURE: 71 MMHG | BODY MASS INDEX: 23.22 KG/M2

## 2018-10-08 PROCEDURE — 302449 STATCHG TRIAGE ONLY (STATISTIC)

## 2018-10-08 ASSESSMENT — PAIN SCALES - GENERAL: PAINLEVEL_OUTOF10: 0

## 2018-10-09 NOTE — ED TRIAGE NOTES
Patient Bib family for c/o sudden onset dizziness and nausea @ 2pm. She states its been progressively getting worse.

## 2018-10-10 ENCOUNTER — OFFICE VISIT (OUTPATIENT)
Dept: OTHER | Facility: IMAGING CENTER | Age: 23
End: 2018-10-10
Payer: OTHER GOVERNMENT

## 2018-10-10 VITALS
BODY MASS INDEX: 23.63 KG/M2 | HEIGHT: 67 IN | TEMPERATURE: 98.2 F | WEIGHT: 150.57 LBS | HEART RATE: 72 BPM | DIASTOLIC BLOOD PRESSURE: 72 MMHG | OXYGEN SATURATION: 99 % | RESPIRATION RATE: 14 BRPM | SYSTOLIC BLOOD PRESSURE: 116 MMHG

## 2018-10-10 DIAGNOSIS — R11.0 NAUSEA: ICD-10-CM

## 2018-10-10 DIAGNOSIS — R42 DIZZINESS: ICD-10-CM

## 2018-10-10 LAB
GLUCOSE BLD-MCNC: 90 MG/DL (ref 70–100)
INT CON NEG: NEGATIVE
INT CON POS: POSITIVE
POC URINE PREGNANCY TEST: NEGATIVE

## 2018-10-10 PROCEDURE — 81025 URINE PREGNANCY TEST: CPT | Performed by: FAMILY MEDICINE

## 2018-10-10 PROCEDURE — 82962 GLUCOSE BLOOD TEST: CPT | Performed by: FAMILY MEDICINE

## 2018-10-10 PROCEDURE — 99214 OFFICE O/P EST MOD 30 MIN: CPT | Performed by: FAMILY MEDICINE

## 2018-10-10 RX ORDER — INFLUENZA A VIRUS A/BRISBANE/02/2018 IVR-190 (H1N1) ANTIGEN (FORMALDEHYDE INACTIVATED), INFLUENZA A VIRUS A/KANSAS/14/2017 X-327 (H3N2) ANTIGEN (FORMALDEHYDE INACTIVATED), INFLUENZA B VIRUS B/PHUKET/3073/2013 ANTIGEN (FORMALDEHYDE INACTIVATED), AND INFLUENZA B VIRUS B/MARYLAND/15/2016 BX-69A ANTIGEN (FORMALDEHYDE INACTIVATED) 15; 15; 15; 15 UG/.5ML; UG/.5ML; UG/.5ML; UG/.5ML
INJECTION, SUSPENSION INTRAMUSCULAR
COMMUNITY
Start: 2018-09-29 | End: 2019-04-21

## 2018-10-10 NOTE — PROGRESS NOTES
SUBJECTIVE:        Chief Complaint   Patient presents with   • Nausea     has been eating regularly    • Dizziness     going on for a few days now, feels like room is spinning        HPI:     Michel Angelo is a 22 y.o. female here for new symptoms of dizziness and nausea for past few days recently.   Has had occasional episodes of feeling the room spinning and feeling light headed. She feels the dizziness causes the nausea.  Feels she eats regularly and drinks plenty of water.   May occur while sitting or standing. No specific triggers or patterns.   The other day symptoms occurred towards the end of the day when she had class. Had to put her head between her legs as otherwise she felt she was being pushed backward. Lasted about an hour. She tends to feel better after she eats.   Sometimes eyes feel dizzy.   Eats breakfast- 2 eggs, toast; lunch- sandwich, gluten free; eats dinner every evening.   Has noticed symptoms within past year and it seems to come on suddenly. Denies chest pain, shortness of breath or palpitations. No recent episodes of syncope.   Has recently been under stress as her friend who was in the   suddenly in a car accident while [place].   She is in school currently, gets about 5 hours of sleep at night.   LMP- this week. No longer in her prior relationship.   Last EKG 2018.      ROS:  Denies any recent fevers or chills. No vomiting. No diarrhea. No chest pains or shortness of breath. No lower extremity edema.    Current Outpatient Prescriptions on File Prior to Visit   Medication Sig Dispense Refill   • acetaminophen (TYLENOL) 325 MG Tab Take 650 mg by mouth every four hours as needed.     • ibuprofen (MOTRIN) 200 MG Tab Take 200 mg by mouth every 6 hours as needed.     • Probiotic Product (PROBIOTIC DAILY PO) Take  by mouth.       No current facility-administered medications on file prior to visit.        Allergies   Allergen Reactions   • Gluten Meal        Patient Active  "Problem List    Diagnosis Date Noted   • Family history of hypercholesterolemia 03/30/2017   • Family history of diabetes mellitus (DM) 03/30/2017   • Neuritis- right scalp cutaneous x 5 days 09/22/2016   • Dysmenorrhea 04/08/2016   • Family history of carcinoid tumor 04/08/2016   • Chronic constipation 04/08/2016   • No active medical problems 11/29/2012       Past Medical History:   Diagnosis Date   • NEGATIVE HISTORY OF      History reviewed. No pertinent surgical history.    Social History     Social History   • Marital status: Single     Spouse name: N/A   • Number of children: N/A   • Years of education: N/A     Occupational History   • [employer]- 10th grade Child     Social History Main Topics   • Smoking status: Never Smoker   • Smokeless tobacco: Never Used   • Alcohol use No   • Drug use: No   • Sexual activity: Yes     Partners: Male     Birth control/ protection: Condom     Other Topics Concern   • Not on file     Social History Narrative    Has 3 sisters. 1 brother.                    OBJECTIVE:   /72 (BP Location: Left arm, Patient Position: Sitting, BP Cuff Size: Adult)   Pulse 72   Temp 36.8 °C (98.2 °F)   Resp 14   Ht 1.702 m (5' 7\")   Wt 68.3 kg (150 lb 9.2 oz)   LMP 10/07/2018   SpO2 99%   BMI 23.58 kg/m²   General: Well-developed well-nourished female, no acute distress  HEENT: oropharynx clear, eyes clear, TMs clear and intact, perrl, eomi  Neck: supple, no lymphadenopathy- cervical or supraclavicular, no thyromegaly  Cardiovascular: regular rate and rhythm, no murmurs, gallops, rubs  Lungs: clear to auscultation bilaterally, no wheezes, crackles, or rhonchi  Abdomen: +bowel sounds, soft, nontender, nondistended, no rebound, no guarding, no hepatosplenomegaly  Extremities: no cyanosis, clubbing, edema  Skin: Warm and dry  Psych: appropriate mood and affect  Neuro: CN 2-12 grossly intact. 5/5 strength throughout, 2+ DTR throughout. Negative rhomberg.     [place] Hallpike- negative. "   Orthostatic BP: normal  Urine pregnancy: negative  POC glucose: 90      ASSESSMENT/PLAN:    22 y.o.female    1. Dizziness -appears mix of vertigo and light headedness of unclear etiology. More so recently, but intermittent symptoms in past year. No specific triggers. Consider associated with current stressors. Evaluation as above. Prior ekg 5/2018.   -Will evaluate further with lab work. Keep journal of symptoms. Discussed routine self care- adequate food/fluid intake, sleep, and management of stressors. Modify activities at this time. Will continue to monitor.  POCT Glucose    CBC WITH DIFFERENTIAL    COMP METABOLIC PANEL    TSH WITH REFLEX TO FT4    POC URINE PREGNANCY   2. Nausea- associated with symptoms of dizziness. Declined medication therapy at this time.   POC URINE PREGNANCY       Return in about 3 weeks (around 10/31/2018).    This medical record contains text that has been entered with the assistance of computer voice recognition and dictation software.  Therefore, it may contain unintended errors in text, spelling, punctuation, or grammar.

## 2018-10-12 ENCOUNTER — HOSPITAL ENCOUNTER (OUTPATIENT)
Dept: LAB | Facility: MEDICAL CENTER | Age: 23
End: 2018-10-12
Attending: FAMILY MEDICINE
Payer: OTHER GOVERNMENT

## 2018-10-12 DIAGNOSIS — R42 DIZZINESS: ICD-10-CM

## 2018-10-12 LAB
ALBUMIN SERPL BCP-MCNC: 4.7 G/DL (ref 3.2–4.9)
ALBUMIN/GLOB SERPL: 1.9 G/DL
ALP SERPL-CCNC: 46 U/L (ref 30–99)
ALT SERPL-CCNC: 32 U/L (ref 2–50)
ANION GAP SERPL CALC-SCNC: 6 MMOL/L (ref 0–11.9)
AST SERPL-CCNC: 38 U/L (ref 12–45)
BASOPHILS # BLD AUTO: 0.9 % (ref 0–1.8)
BASOPHILS # BLD: 0.05 K/UL (ref 0–0.12)
BILIRUB SERPL-MCNC: 0.6 MG/DL (ref 0.1–1.5)
BUN SERPL-MCNC: 17 MG/DL (ref 8–22)
CALCIUM SERPL-MCNC: 10 MG/DL (ref 8.5–10.5)
CHLORIDE SERPL-SCNC: 106 MMOL/L (ref 96–112)
CO2 SERPL-SCNC: 28 MMOL/L (ref 20–33)
CREAT SERPL-MCNC: 0.99 MG/DL (ref 0.5–1.4)
EOSINOPHIL # BLD AUTO: 0.08 K/UL (ref 0–0.51)
EOSINOPHIL NFR BLD: 1.4 % (ref 0–6.9)
ERYTHROCYTE [DISTWIDTH] IN BLOOD BY AUTOMATED COUNT: 40.5 FL (ref 35.9–50)
FASTING STATUS PATIENT QL REPORTED: NORMAL
GLOBULIN SER CALC-MCNC: 2.5 G/DL (ref 1.9–3.5)
GLUCOSE SERPL-MCNC: 76 MG/DL (ref 65–99)
HCT VFR BLD AUTO: 47.3 % (ref 37–47)
HGB BLD-MCNC: 15.1 G/DL (ref 12–16)
IMM GRANULOCYTES # BLD AUTO: 0.02 K/UL (ref 0–0.11)
IMM GRANULOCYTES NFR BLD AUTO: 0.3 % (ref 0–0.9)
LYMPHOCYTES # BLD AUTO: 2.56 K/UL (ref 1–4.8)
LYMPHOCYTES NFR BLD: 43.5 % (ref 22–41)
MCH RBC QN AUTO: 28.8 PG (ref 27–33)
MCHC RBC AUTO-ENTMCNC: 31.9 G/DL (ref 33.6–35)
MCV RBC AUTO: 90.3 FL (ref 81.4–97.8)
MONOCYTES # BLD AUTO: 0.33 K/UL (ref 0–0.85)
MONOCYTES NFR BLD AUTO: 5.6 % (ref 0–13.4)
NEUTROPHILS # BLD AUTO: 2.84 K/UL (ref 2–7.15)
NEUTROPHILS NFR BLD: 48.3 % (ref 44–72)
NRBC # BLD AUTO: 0 K/UL
NRBC BLD-RTO: 0 /100 WBC
PLATELET # BLD AUTO: 222 K/UL (ref 164–446)
PMV BLD AUTO: 12.7 FL (ref 9–12.9)
POTASSIUM SERPL-SCNC: 3.8 MMOL/L (ref 3.6–5.5)
PROT SERPL-MCNC: 7.2 G/DL (ref 6–8.2)
RBC # BLD AUTO: 5.24 M/UL (ref 4.2–5.4)
SODIUM SERPL-SCNC: 140 MMOL/L (ref 135–145)
TSH SERPL DL<=0.005 MIU/L-ACNC: 2.04 UIU/ML (ref 0.38–5.33)
WBC # BLD AUTO: 5.9 K/UL (ref 4.8–10.8)

## 2018-10-12 PROCEDURE — 84443 ASSAY THYROID STIM HORMONE: CPT

## 2018-10-12 PROCEDURE — 36415 COLL VENOUS BLD VENIPUNCTURE: CPT

## 2018-10-12 PROCEDURE — 85025 COMPLETE CBC W/AUTO DIFF WBC: CPT

## 2018-10-12 PROCEDURE — 80053 COMPREHEN METABOLIC PANEL: CPT

## 2018-10-31 ENCOUNTER — OFFICE VISIT (OUTPATIENT)
Dept: OTHER | Facility: IMAGING CENTER | Age: 23
End: 2018-10-31
Payer: OTHER GOVERNMENT

## 2018-10-31 VITALS
DIASTOLIC BLOOD PRESSURE: 60 MMHG | TEMPERATURE: 98.6 F | SYSTOLIC BLOOD PRESSURE: 100 MMHG | HEART RATE: 82 BPM | RESPIRATION RATE: 14 BRPM | OXYGEN SATURATION: 98 % | WEIGHT: 150.79 LBS | HEIGHT: 67 IN | BODY MASS INDEX: 23.67 KG/M2

## 2018-10-31 DIAGNOSIS — R42 DIZZINESS: ICD-10-CM

## 2018-10-31 DIAGNOSIS — R11.0 NAUSEA: ICD-10-CM

## 2018-10-31 DIAGNOSIS — L72.9 FOLLICULAR CYST OF SKIN AND SUBCUTANEOUS TISSUE: ICD-10-CM

## 2018-10-31 PROCEDURE — 99214 OFFICE O/P EST MOD 30 MIN: CPT | Performed by: FAMILY MEDICINE

## 2018-10-31 NOTE — PROGRESS NOTES
SUBJECTIVE:    Chief Complaint   Patient presents with   • Dizziness   • Skin Lesion     on groin, still present and painful     HPI:    Michel Angelo is a 23 y.o female here for follow up of dizziness and nausea.   Patient is here today with her mother.    Dizziness- improved with changes of lifestyle and working on self-care.  She has been eating more regularly throughout the day, hydrating, sleeping more and spending more time for herself.  She has been taking time for herself and reflecting about her future.  Has decided she may want to go to nursing school and [employer] school.  Overall feels that her dizziness and nausea symptoms have improved.    She also has a skin lesion on the left groin region that has intermittently flared up.  Tends to be along her underwear line.  She saw dermatology in [place] 2 times and had 2 corticosteroid injections about a month apart and was prescribed a topical wash which she has available.  Also discussed possible antibiotic therapy for 3 months which she did not desire.  Saw the PA at the office.  She has had her current lesion intermittently for the past few months.  Not sure exactly what exacerbates her symptoms at this time.       ROS:  Denies any recent fevers or chills. No nausea or vomiting. No diarrhea. No chest pains or shortness of breath. No lower extremity edema.    Current Outpatient Prescriptions on File Prior to Visit   Medication Sig Dispense Refill   • FLUZONE QUADRIVALENT 0.5 ML Suspension Prefilled Syringe injection      • acetaminophen (TYLENOL) 325 MG Tab Take 650 mg by mouth every four hours as needed.     • ibuprofen (MOTRIN) 200 MG Tab Take 200 mg by mouth every 6 hours as needed.     • Probiotic Product (PROBIOTIC DAILY PO) Take  by mouth.       No current facility-administered medications on file prior to visit.        Allergies   Allergen Reactions   • Gluten Meal        Patient Active Problem List    Diagnosis Date Noted   • Family history of  "hypercholesterolemia 03/30/2017   • Family history of diabetes mellitus (DM) 03/30/2017   • Neuritis- right scalp cutaneous x 5 days 09/22/2016   • Dysmenorrhea 04/08/2016   • Family history of carcinoid tumor 04/08/2016   • Chronic constipation 04/08/2016   • No active medical problems 11/29/2012       Past Medical History:   Diagnosis Date   • NEGATIVE HISTORY OF        OBJECTIVE:   /60   Pulse 82   Temp 37 °C (98.6 °F)   Resp 14   Ht 1.702 m (5' 7\")   Wt 68.4 kg (150 lb 12.7 oz)   LMP 10/08/2018   SpO2 98%   BMI 23.62 kg/m²   General: Well-developed well-nourished female, no acute distress  Extremities: no cyanosis, clubbing, edema  Skin: Warm and dry.  Left inferior and medial groin region with small area of slightly pink, 3-4 mm soft tissue nodule, no significant fluctuance noted, mildly tender to palpation, no drainage.  Psych: appropriate mood and affect      ASSESSMENT/PLAN:    23 y.o.female    1. Dizziness-improved with lifestyle changes. Continue current healthy lifestyle habits.     2. Nausea-improved with lifestyle changes. Continue current healthy lifestyle habits.     3. Follicular cyst of skin and subcutaneous tissue-left lower groin area, minimal inflammation noted at the area.  No signs of current infection noted.  She has had intermittent issues of area for a few months, suspect due to irritation of area.  -Discussed options of therapy. Appears may be associated with irritation from underwear line. Recommend modification of undergarments and wearing loose clothing.  May try a barrier cream in the area. Keep area clean.   If continued symptoms, recommend follow-up with dermatology to consider removal of underlying lesion.       Return if symptoms worsen or fail to improve.  Recommend schedule for routine gynecological visit and Pap in future.    This medical record contains text that has been entered with the assistance of computer voice recognition and dictation software.  Therefore, " it may contain unintended errors in text, spelling, punctuation, or grammar.    > 30 minutes spent with this patient of which > 15 minutes spent on counseling and coordination of care as above, excluding any time for procedures.

## 2018-11-01 ENCOUNTER — HOSPITAL ENCOUNTER (EMERGENCY)
Facility: MEDICAL CENTER | Age: 23
End: 2018-11-01
Attending: EMERGENCY MEDICINE
Payer: OTHER GOVERNMENT

## 2018-11-01 VITALS
WEIGHT: 150.13 LBS | OXYGEN SATURATION: 94 % | HEIGHT: 67 IN | DIASTOLIC BLOOD PRESSURE: 79 MMHG | HEART RATE: 83 BPM | TEMPERATURE: 97.1 F | SYSTOLIC BLOOD PRESSURE: 105 MMHG | BODY MASS INDEX: 23.56 KG/M2 | RESPIRATION RATE: 18 BRPM

## 2018-11-01 DIAGNOSIS — S05.02XA ABRASION OF LEFT CORNEA, INITIAL ENCOUNTER: ICD-10-CM

## 2018-11-01 PROCEDURE — 700101 HCHG RX REV CODE 250: Performed by: EMERGENCY MEDICINE

## 2018-11-01 PROCEDURE — 99284 EMERGENCY DEPT VISIT MOD MDM: CPT

## 2018-11-01 RX ORDER — POLYMYXIN B SULFATE AND TRIMETHOPRIM 1; 10000 MG/ML; [USP'U]/ML
1 SOLUTION OPHTHALMIC EVERY 4 HOURS
Qty: 10 ML | Refills: 0 | Status: SHIPPED | OUTPATIENT
Start: 2018-11-01 | End: 2018-11-06

## 2018-11-01 RX ORDER — PROPARACAINE HYDROCHLORIDE 5 MG/ML
1 SOLUTION/ DROPS OPHTHALMIC ONCE
Status: COMPLETED | OUTPATIENT
Start: 2018-11-01 | End: 2018-11-01

## 2018-11-01 RX ADMIN — PROPARACAINE HYDROCHLORIDE 1 DROP: 5 SOLUTION/ DROPS OPHTHALMIC at 21:15

## 2018-11-01 RX ADMIN — FLUORESCEIN SODIUM 1 MG: 1 STRIP OPHTHALMIC at 21:15

## 2018-11-01 ASSESSMENT — PAIN SCALES - GENERAL: PAINLEVEL_OUTOF10: 2

## 2018-11-02 NOTE — ED PROVIDER NOTES
"ED Provider Note    ER PROVIDER NOTE          CHIEF COMPLAINT  Chief Complaint   Patient presents with   • Foreign Body in Eye       HPI  Michel Angelo is a 23 y.o. female who presents to the emergency department complaining of left-sided eye pain.  Patient was cleaning dishes when she dropped a glass which shattered her pain began.  She immediately flushed her eye with copious water but is had some continued pain.  She denies any visual symptoms.     REVIEW OF SYSTEMS  Pertinent positives include eye pain. Pertinent negatives include no visual symptoms. See HPI for details. All other systems reviewed and are negative.    PAST MEDICAL HISTORY   has a past medical history of NEGATIVE HISTORY OF.    SOCIAL HISTORY  Social History   Substance Use Topics   • Smoking status: Never Smoker   • Smokeless tobacco: Never Used   • Alcohol use No       SURGICAL HISTORY  patient denies any surgical history    CURRENT MEDICATIONS  Home Medications     Reviewed by Reynaldo Bradford R.N. (Registered Nurse) on 11/01/18 at 2046  Med List Status: Partial   Medication Last Dose Status   FLUZONE QUADRIVALENT 0.5 ML Suspension Prefilled Syringe injection  Active                ALLERGIES  Allergies   Allergen Reactions   • Gluten Meal        PHYSICAL EXAM  VITAL SIGNS: /79   Pulse 79   Temp 36.2 °C (97.1 °F)   Resp 18   Ht 1.702 m (5' 7\")   Wt 68.1 kg (150 lb 2.1 oz)   LMP 10/08/2018   SpO2 100%   BMI 23.51 kg/m²   Pulse ox interpretation: I interpret this pulse ox as normal.    Constitutional: Alert.  In no apparent distress.  HENT: Normocephalic, Atraumatic, Bilateral external ears normal. Nose normal.  Injected sclera on left  Lids and lashes everted with no foreign body or lesion  On slit-lamp, anterior chamber is quiet and deep, no foreign body noted, proparacaine applied with full relief of pain, fluorescein applied, linear corneal abrasion inferiorly, no foreign body, negative Ousmane's    Eyes: Pupils are equal and " reactive. Conjunctiva normal, non-icteric.   Heart: Regular rate and rhythm, no murmurs.    Lungs: Clear to auscultation bilaterally.  Skin: Warm, Dry, No erythema, No rash.   Neurologic: Alert, Grossly non-focal.   Psychiatric: Affect normal, Judgment normal, Mood normal, Appears appropriate and not intoxicated.     DIAGNOSTIC STUDIES / PROCEDURES          COURSE & MEDICAL DECISION MAKING  Nursing notes, VS, PMSFHx reviewed in chart.    8:56 PM - Patient seen and examined at bedside.     Decision Making:  This is a 23 y.o. female presenting with eye pain.  She appears to have a linear corneal abrasion, likely scratch from this shattered glass.  However I see no evidence of retained foreign body on her exam here.  Additionally doubt globe perforation as she is negative Ousmane's and otherwise unremarkable eye exam.  Will prescribe Polytrim drops for antibiotic prophylaxis, follow-up with primary care, discussed patching and home care  The patient will return for new or worsening symptoms and is stable at the time of discharge.    The patient is referred to a primary physician for blood pressure management, diabetic screening, and for all other preventative health concerns.      DISPOSITION:  Patient will be discharged home in stable condition.    FOLLOW UP:  Gisselle Mcneil M.D.  [address] [facility]  [place] [id number] [phone]    In 1 week  As needed      OUTPATIENT MEDICATIONS:  New Prescriptions    POLYMIXIN-TRIMETHOPRIM (POLYTRIM) 94767-7.1 UNIT/ML-% SOLUTION    Place 1 Drop in left eye every 4 hours for 5 days.         FINAL IMPRESSION  1. Abrasion of left cornea, initial encounter         The note accurately reflects work and decisions made by me.  Domo Lee  11/1/2018  9:24 PM

## 2018-11-02 NOTE — ED NOTES
Pt bib father with c/o left eye pain. Pt states she was cleaning out the dishes when she broke a glass cup and a shard hit her eye.

## 2018-11-20 ENCOUNTER — OFFICE VISIT (OUTPATIENT)
Dept: OTHER | Facility: IMAGING CENTER | Age: 23
End: 2018-11-20
Payer: OTHER GOVERNMENT

## 2018-11-20 VITALS
BODY MASS INDEX: 23.49 KG/M2 | TEMPERATURE: 99.2 F | RESPIRATION RATE: 14 BRPM | DIASTOLIC BLOOD PRESSURE: 70 MMHG | OXYGEN SATURATION: 99 % | HEART RATE: 74 BPM | HEIGHT: 67 IN | WEIGHT: 149.69 LBS | SYSTOLIC BLOOD PRESSURE: 102 MMHG

## 2018-11-20 DIAGNOSIS — J02.9 SORE THROAT: ICD-10-CM

## 2018-11-20 LAB
INT CON NEG: NEGATIVE
INT CON POS: POSITIVE
S PYO AG THROAT QL: NEGATIVE

## 2018-11-20 PROCEDURE — 99213 OFFICE O/P EST LOW 20 MIN: CPT | Performed by: INTERNAL MEDICINE

## 2018-11-20 PROCEDURE — 87880 STREP A ASSAY W/OPTIC: CPT | Performed by: INTERNAL MEDICINE

## 2018-11-20 NOTE — PROGRESS NOTES
"Chief Complaint   Patient presents with   • Pharyngitis     3 days, right side, hurts to move or speak     Subjective:   Michel Angelo is a 23 y.o. female here today for multiple problems as listed below.       HPI: 3 days of illness including: Sore throat on right side - pain with swallowing and sometimes talking/moving neck to the right - pin point area. It seems to be more at level of thyroid vs back of throat.  No swelling.  Dental work done recently - root canal. States she had a follow-up with dental office who were not concered about complication from procedure or dental source of infection.  Also mentions she had been eating a tortilla chip and swallowed it down wrong - thinks symptoms may correlate. Denies fever, chills, night sweats, nasal congestion, rhinorrhea, facial pain, swollen glands, cough, chest pain, hemoptysis, dyspnea, wheezing, vomiting, diarrhea, ear pain.   Similarly ill exposures: yes, [employer] student  Medical history negative for recurrent OM, tonsillitis, sinusitis, asthma, bronchitis, pneumonia. Has had strep and pharyngitis before but does not think symptoms are similar. Would like to rule out strep throat however.   She  reports that she has never smoked. She has never used smokeless tobacco.    Current medicines (including changes today)  Current Outpatient Prescriptions   Medication Sig Dispense Refill   • FLUZONE QUADRIVALENT 0.5 ML Suspension Prefilled Syringe injection        No current facility-administered medications for this visit.      She  has a past medical history of NEGATIVE HISTORY OF.    ROS   No chest pain, no shortness of breath, no abdominal pain, no nausea,  diarrhea/constipation, no hematochezia, melena.       Objective:     Blood pressure 102/70, pulse 74, temperature 37.3 °C (99.2 °F), temperature source Temporal, resp. rate 14, height 1.702 m (5' 7\"), weight 67.9 kg (149 lb 11.1 oz), SpO2 99 %, not currently breastfeeding. Body mass index is 23.45 kg/m². "   Physical Exam:  Gen: alert, No conversational dyspnea. No audible wheeze, nontoxic.  Eyes: PERRL, conjunctivae not injected. No photophobia. No eye discharge.  Throat: Posterior pharynx with erythematous injection. No exudate.   Neck: supple, with tender to palpation at right-side cricoid level, no visible external swelling, non-palpable anterior cervical, posterior cervical, preauricular, postauricular, occipital, submandibular LN.   Lungs:  clear to auscultation, no wheezing, no retraction, no stridor, good air exchange.   Heart: Regular rate and rhythm, no murmur.  Rapid strep negative.    Assessment and Plan:   The following treatment plan was discussed   1. Sore throat  POCT Rapid Strep A   Rapid strep negative. Patient is non-toxic appearing.  Based on clinical history and physical exam, doubt infectious source. May be due to esophageal mucosal injury at level of cricoid with history of tortilla chip ingestion. No signs/symptoms of overt bleeding. Discussed conservative management including OTC non-steroidals or acetaminophen PRN, warm fluids, adequate nutrition and hydration.  We also discussed usage of botanicals including marshmallow root tea and/or slippery elm root which may aid with mucosal healing.  Follow-up for worsening symptoms, difficulty breathing, lack of expected recovery, or should new symptoms or problems arise. If symptoms are unchanged, consider referral to ENT for endoscopy if not improving.     Followup: Return in about 1 week (around 11/27/2018), or if symptoms worsen or fail to improve, for follow-up with PCP.       Please note that dictation has been dictated using voice recognition soft ware. I have made every reasonable attempt to correct obvious errors, but I expect that there are errors of grammar and possibly content that I did not discover before finalizing the note.

## 2019-01-03 ENCOUNTER — OFFICE VISIT (OUTPATIENT)
Dept: OTHER | Facility: IMAGING CENTER | Age: 24
End: 2019-01-03
Payer: OTHER GOVERNMENT

## 2019-01-03 VITALS
WEIGHT: 152.12 LBS | HEIGHT: 67 IN | BODY MASS INDEX: 23.88 KG/M2 | SYSTOLIC BLOOD PRESSURE: 106 MMHG | TEMPERATURE: 98.5 F | RESPIRATION RATE: 14 BRPM | HEART RATE: 73 BPM | DIASTOLIC BLOOD PRESSURE: 60 MMHG | OXYGEN SATURATION: 99 %

## 2019-01-03 DIAGNOSIS — L72.9 FOLLICULAR CYST OF SKIN AND SUBCUTANEOUS TISSUE: ICD-10-CM

## 2019-01-03 PROCEDURE — 99214 OFFICE O/P EST MOD 30 MIN: CPT | Performed by: INTERNAL MEDICINE

## 2019-01-03 RX ORDER — CLINDAMYCIN PHOSPHATE 10 MG/G
GEL TOPICAL
Qty: 1 TUBE | Refills: 0 | Status: SHIPPED | OUTPATIENT
Start: 2019-01-03 | End: 2019-04-21

## 2019-01-03 ASSESSMENT — PATIENT HEALTH QUESTIONNAIRE - PHQ9: CLINICAL INTERPRETATION OF PHQ2 SCORE: 0

## 2019-01-03 NOTE — PROGRESS NOTES
Chief Complaint   Patient presents with   • Cyst     possible, very inflammed and bleeding     Subjective:   Michel Angelo is a 23 y.o. female here today for multiple problems as listed below.    Patient's usual primary care provider is Gisselle Mnceil M.D.     Patient states she has had a recurrent left groin follicular cyst by underwear line. Several months duration. Was seen by dermatology and has had it injected with steroids x 2 but still having flare ups. Seems to be worse around her period (started today). When she is in the shower, it seems to get larger and has minimal bleeding.  Was prescribed topical benzoyl peroxide which she has used without much improvement. Was recommended course of antibiotics but she is reluctant to take them due to side effects and notes that she always has yeast infection with antibiotics - would like to use them as last resort. Leaving for trip to [place] today for the weekend. Has been wearing loose fitting clothing and applying topical OTC antibiotic ointment.   Denies fever/chills.  Patient states she is gluten free due to celiac disease.    Depression Screening    Little interest or pleasure in doing things?  0 - not at all   Feeling down, depressed , or hopeless? 0 - not at all   Trouble falling or staying asleep, or sleeping too much?      Feeling tired or having little energy?      Poor appetite or overeating?      Feeling bad about yourself - or that you are a failure or have let yourself or your family down?     Trouble concentrating on things, such as reading the newspaper or watching television?     Moving or speaking so slowly that other people could have noticed.  Or the opposite - being so fidgety or restless that you have been moving around a lot more than usual?      Thoughts that you would be better off dead, or of hurting yourself?      Patient Health Questionnaire Score:         If depressive symptoms identified deferred to follow up visit unless  "specifically addressed in assesment and plan.    Interpretation of PHQ-9 Total Score   Score Severity   1-4 No Depression   5-9 Mild Depression   10-14 Moderate Depression   15-19 Moderately Severe Depression   20-27 Severe Depression    Current medicines (including changes today)  Current Outpatient Prescriptions   Medication Sig Dispense Refill   • clindamycin (CLEOCIN T) 1 % Gel Apply to affected area twice a day x 7 days 1 Tube 0   • FLUZONE QUADRIVALENT 0.5 ML Suspension Prefilled Syringe injection        No current facility-administered medications for this visit.      She  has a past medical history of NEGATIVE HISTORY OF.    ROS   No chest pain, no shortness of breath, no abdominal pain, no diarrhea/constipation, no urinary symptoms.     Objective:     Blood pressure 106/60, pulse 73, temperature 36.9 °C (98.5 °F), temperature source Temporal, resp. rate 14, height 1.702 m (5' 7\"), weight 69 kg (152 lb 1.9 oz), last menstrual period 01/03/2019, SpO2 99 %, not currently breastfeeding. Body mass index is 23.82 kg/m².   Physical Exam:  Alert, oriented in no acute distress.  Eye contact is good, speech goal directed, affect calm  HEENT: conjunctiva non-injected, sclera non-icteric.   Skin: Left medial groin subcutaneous nodule about 4-5 mm diameter without surrounding fluctuance or erythema, mild tenderness to palpation, no active drainage or bleeding noted.   Ext: no edema, color normal, vascularity normal, temperature normal    Assessment and Plan:   The following treatment plan was discussed  1. Follicular cyst of skin and subcutaneous tissue  clindamycin (CLEOCIN T) 1 % Gel   Mildly inflamed without signs of active infection. Trial of topical clindamycin gel twice a day. Discussed use of warm compresses to area several times a day. Consider follow-up with dermatology for I&D with cyst removal if symptoms are worsening/not improving. Continue preventative measures with hair removal and use of loose fitting " clothing.    Followup: Return in about 2 weeks (around 1/17/2019), or if symptoms worsen or fail to improve, for follow-up with PCP.    Spent 25 minutes in face-to-tace patient contact in which greater than 50% of the visit was spent in counseling and coordination of care including discussing pathophysiology of folliculitis and treatment options including course of oral antibiotics, associated risks, and benefits of antiinflammatory diet - supplemental handout given. Please refer to assessment and plan/discussion/recommendations for additional details.        Please note that dictation has been dictated using voice recognition soft ware. I have made every reasonable attempt to correct obvious errors, but I expect that there are errors of grammar and possibly content that I did not discover before finalizing the note.

## 2019-01-11 ENCOUNTER — TELEPHONE (OUTPATIENT)
Dept: OTHER | Facility: IMAGING CENTER | Age: 24
End: 2019-01-11

## 2019-01-11 NOTE — TELEPHONE ENCOUNTER
Called and notified patient about MyChart message from Dr. Wheeler. Let her know she should follow-up with Derm. Patient said she will make appointment. No other questions at this time.

## 2019-01-11 NOTE — TELEPHONE ENCOUNTER
----- Message from Macy Wheeler D.O. sent at 1/8/2019 11:03 AM PST -----  Regarding: FW: follicular cyst  Please advise pt, thanks!  ----- Message -----  From: Macy Wheeler D.O.  Sent: 1/3/2019  To: Michel Angelo  Subject: follicular cyst                                  Hi Michel,  Dr. Mcneil prefers you to follow-up with your dermatology office for a possible incision/drainage and cyst removal procedure, as our office isn't that well-equipped currently for procedures. Let us know if you need another referral.   Dr. Wheeler

## 2019-03-11 ENCOUNTER — HOSPITAL ENCOUNTER (OUTPATIENT)
Facility: MEDICAL CENTER | Age: 24
End: 2019-03-11
Attending: OBSTETRICS & GYNECOLOGY
Payer: OTHER GOVERNMENT

## 2019-03-11 ENCOUNTER — GYNECOLOGY VISIT (OUTPATIENT)
Dept: OBGYN | Facility: MEDICAL CENTER | Age: 24
End: 2019-03-11
Payer: OTHER GOVERNMENT

## 2019-03-11 VITALS
HEIGHT: 67 IN | DIASTOLIC BLOOD PRESSURE: 76 MMHG | SYSTOLIC BLOOD PRESSURE: 100 MMHG | BODY MASS INDEX: 23.7 KG/M2 | WEIGHT: 151 LBS

## 2019-03-11 DIAGNOSIS — Z01.419 WELL WOMAN EXAM WITH ROUTINE GYNECOLOGICAL EXAM: ICD-10-CM

## 2019-03-11 DIAGNOSIS — L73.2 HIDRADENITIS SUPPURATIVA: ICD-10-CM

## 2019-03-11 DIAGNOSIS — Z12.4 CERVICAL CANCER SCREENING: ICD-10-CM

## 2019-03-11 PROCEDURE — 99395 PREV VISIT EST AGE 18-39: CPT | Performed by: OBSTETRICS & GYNECOLOGY

## 2019-03-11 PROCEDURE — 87591 N.GONORRHOEAE DNA AMP PROB: CPT

## 2019-03-11 PROCEDURE — 88175 CYTOPATH C/V AUTO FLUID REDO: CPT

## 2019-03-11 PROCEDURE — 87491 CHLMYD TRACH DNA AMP PROBE: CPT

## 2019-03-11 NOTE — PROGRESS NOTES
Well Woman Exam    CC: well woman exam        HPI: Michel Angelo is a 23 y.o.  female who presents for her Annual Gynecologic Exam  Pt has no complaints.  Does report a history of a cyst in her left inguinal crease.  Wondering what this is.  Says that it comes and goes from time to time, has had on the right as well.  Typically spontaneously drains on its own.  Has never needed to get them lanced or taken antibiotics for this.  Denies axillary lesions.    Not sexually active since last summer.  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  Is considering getting IUD after this semester is over.      Health Maintenance:  Pap: thinks normal 3yrs ago  Gardisil: completed      ROS:  Gen: denies fevers, general concerns  CV/resp: reports no concerns  Breasts: no masses/changes  GI: denies abd pain, GI concerns  : denies irregular vaginal bleeding, discharge, pain, denies urinary complaints  Endo: reports no significant weight changes, irregular menses, temperature intolerance, hotflashes/nightsweats  Psych: reports no concerns about mood    OB History    Para Term  AB Living   0 0 0 0 0 0   SAB TAB Ectopic Molar Multiple Live Births   0 0 0   0                 GYN Hx:   13/monthly/4d  Denies hx of STIs  Denies hx of abnl paps     PMHx: denies  PSHx: knee surgery    Medications:   Current Outpatient Prescriptions Ordered in [facility]   Medication Sig Dispense Refill   • clindamycin (CLEOCIN T) 1 % Gel Apply to affected area twice a day x 7 days 1 Tube 0   • FLUZONE QUADRIVALENT 0.5 ML Suspension Prefilled Syringe injection        No current Epic-ordered facility-administered medications on file.        Allergies: Gluten meal    Social History     Social History   • Marital status: Single     Spouse name: N/A   • Number of children: N/A   • Years of education: N/A     Occupational History   • [employer]- 10th grade Child     Social History Main Topics   • Smoking status: Never Smoker  "  • Smokeless tobacco: Never Used   • Alcohol use No   • Drug use: No       Family History   Problem Relation Age of Onset   • Hyperlipidemia Mother    • Hyperlipidemia Father    • Cancer Brother 14        adenocarcinoma- lung, carcinoid- appendix   • Genitourinary () Other         cousin   • Diabetes Maternal Grandmother    • Breast Cancer Maternal Grandmother         postmenopausal           Physical Exam   /76 (BP Location: Right arm, Patient Position: Sitting)   Ht 1.702 m (5' 7\")   Wt 68.5 kg (151 lb)   LMP 2019 (Exact Date)   Breastfeeding? No   BMI 23.65 kg/m²   gen: AAO, NAD, affect appropriate  CV: RRR; no LE edema  resp: ctab  Breast: symmetric, no skin changes, no masses, nontender, no nipple discharge, no lymphadenopathy  abd: soft, NT, ND, no masses, no organomegaly, no hernias  : NEFG, normal urethral meatus, normal anus/perineum, normal vagina and cervix.  Uterus small, anteverted, no adnexal masses/tenderness  Bilateral inguinal creases with scarring present, L>R, no masses or fluctuance today  Skin: warm/dry, no lesions    Breast and pelvic exam chaperoned by MA (AB).  Assessment:   23 y.o.  here for well woman exam  1. Cervical cancer screening  THINPREP RFLX HPV ASCUS W/CTNG   2. Well woman exam with routine gynecological exam  THINPREP RFLX HPV ASCUS W/CTNG       Plan:   Pap:collected, reviewed pap guidelines  Briefly discussed contraceptive options.  Patient is very interested in levonorgestrel IUD, considering getting pregnant after the semester is over.  Discussed to let us know when she is ready to order the device as it takes another month or so to get the device prior to insertion.  Patient to let us know when ready to order, aware that she will need to come into the office to sign paperwork in order for us to order from pharmacy    Groin scarring consistent with hidradenitis suppurativa.  No lesions currently.  Discussed with patient that I am suspicious this is " what she has.  Discussed keeping the area clean and dry, frequent cleansing, possible need for drainage if she develops lesions, topical or oral antibiotics at times.  Follow-up as needed    Follow up in 1 year for WWE or PRERNIE Alba MD  [facility], Women's Health

## 2019-03-12 DIAGNOSIS — Z01.419 WELL WOMAN EXAM WITH ROUTINE GYNECOLOGICAL EXAM: ICD-10-CM

## 2019-03-12 DIAGNOSIS — Z12.4 CERVICAL CANCER SCREENING: ICD-10-CM

## 2019-03-15 LAB
C TRACH DNA GENITAL QL NAA+PROBE: NEGATIVE
CYTOLOGY REG CYTOL: NORMAL
N GONORRHOEA DNA GENITAL QL NAA+PROBE: NEGATIVE
SPECIMEN SOURCE: NORMAL

## 2019-03-28 DIAGNOSIS — Z30.019 ENCOUNTER FOR INITIAL PRESCRIPTION OF CONTRACEPTIVES, UNSPECIFIED CONTRACEPTIVE: ICD-10-CM

## 2019-04-10 ENCOUNTER — TELEPHONE (OUTPATIENT)
Dept: OBGYN | Facility: CLINIC | Age: 24
End: 2019-04-10

## 2019-04-10 NOTE — TELEPHONE ENCOUNTER
Irwin from [facility] pharmacy called requesting insurance info to check benefits for Mirena.  Info provided, states we will receive a fax w/benefits determination soon.  Has not further questions

## 2019-04-21 ENCOUNTER — OFFICE VISIT (OUTPATIENT)
Dept: URGENT CARE | Facility: CLINIC | Age: 24
End: 2019-04-21
Payer: OTHER GOVERNMENT

## 2019-04-21 VITALS
SYSTOLIC BLOOD PRESSURE: 120 MMHG | BODY MASS INDEX: 24.48 KG/M2 | DIASTOLIC BLOOD PRESSURE: 72 MMHG | HEART RATE: 78 BPM | HEIGHT: 67 IN | OXYGEN SATURATION: 98 % | TEMPERATURE: 98.7 F | WEIGHT: 156 LBS | RESPIRATION RATE: 14 BRPM

## 2019-04-21 DIAGNOSIS — J22 LRTI (LOWER RESPIRATORY TRACT INFECTION): ICD-10-CM

## 2019-04-21 PROCEDURE — 99214 OFFICE O/P EST MOD 30 MIN: CPT | Performed by: FAMILY MEDICINE

## 2019-04-21 RX ORDER — PROMETHAZINE HYDROCHLORIDE 25 MG/1
TABLET ORAL
COMMUNITY
Start: 2019-03-05 | End: 2019-04-21

## 2019-04-21 RX ORDER — DOXYCYCLINE HYCLATE 100 MG
100 TABLET ORAL 2 TIMES DAILY
Qty: 20 TAB | Refills: 0 | Status: SHIPPED | OUTPATIENT
Start: 2019-04-21 | End: 2019-05-01

## 2019-04-21 RX ORDER — OXYCODONE AND ACETAMINOPHEN 7.5; 325 MG/1; MG/1
TABLET ORAL
COMMUNITY
Start: 2019-03-05 | End: 2019-04-21

## 2019-04-21 RX ORDER — CEPHALEXIN 500 MG/1
CAPSULE ORAL
COMMUNITY
Start: 2019-03-05 | End: 2019-04-21

## 2019-04-21 NOTE — PROGRESS NOTES
"Subjective:      Michel Angelo is a 23 y.o. female who presents with Cough (with flem x 1week, headache [id number])            This is a new problem.  23-year-old non-smoker presenting for a week history of progressive worsening and more productive cough and also some sinus congestion.  She denies any fever but has had some chills.  She denies any travel history exposure to pneumonia.  She denies any sore throat or ear pain.  She has been using over-the-counter cough medication with help.        Review of Systems   All other systems reviewed and are negative.         Objective:     /72   Pulse 78   Temp 37.1 °C (98.7 °F)   Resp 14   Ht 1.702 m (5' 7\")   Wt 70.8 kg (156 lb)   SpO2 98%   BMI 24.43 kg/m²      Physical Exam   Constitutional: She is oriented to person, place, and time. She appears well-developed and well-nourished.  Non-toxic appearance. No distress.   HENT:   Head: Normocephalic and atraumatic.   Right Ear: Tympanic membrane, external ear and ear canal normal.   Left Ear: Tympanic membrane, external ear and ear canal normal.   Nose: Mucosal edema present. Right sinus exhibits no maxillary sinus tenderness and no frontal sinus tenderness. Left sinus exhibits no maxillary sinus tenderness and no frontal sinus tenderness.   Mouth/Throat: Uvula is midline and oropharynx is clear and moist. No oral lesions. No trismus in the jaw. No uvula swelling. No oropharyngeal exudate, posterior oropharyngeal edema, posterior oropharyngeal erythema or tonsillar abscesses. No tonsillar exudate.   Eyes: Conjunctivae are normal. No scleral icterus.   Neck: Neck supple.   Cardiovascular: Normal rate and regular rhythm.  Exam reveals no gallop and no friction rub.    No murmur heard.  Pulmonary/Chest: Effort normal. No stridor. No respiratory distress. She has no wheezes. She has no rales.   Lymphadenopathy:     She has no cervical adenopathy.   Neurological: She is alert and oriented to person, place, and " time.   Skin: Skin is warm. No rash noted. She is not diaphoretic. No erythema. No pallor.   Psychiatric: She has a normal mood and affect.             Assessment/Plan:     1. LRTI (lower respiratory tract infection)  - doxycycline (VIBRAMYCIN) 100 MG Tab; Take 1 Tab by mouth 2 times a day for 10 days.  Dispense: 20 Tab; Refill: 0    Continue symptomatic care  Plan per orders and instructions  Warning signs reviewed

## 2019-04-21 NOTE — PATIENT INSTRUCTIONS
Start Doxycycline twice daily as directed for 10 days. Make sure you stay up right for at least 2 hours after each oral antibiotic use    For more information see the handout below    Bronchitis  Bronchitis is the body's way of reacting to injury and/or infection (inflammation) of the bronchi. Bronchi are the air tubes that extend from the windpipe into the lungs. If the inflammation becomes severe, it may cause shortness of breath.  CAUSES   Inflammation may be caused by:  · A virus.  · Germs (bacteria).  · Dust.  · Allergens.  · Pollutants and many other irritants.  The cells lining the bronchial tree are covered with tiny hairs (cilia). These constantly beat upward, away from the lungs, toward the mouth. This keeps the lungs free of pollutants. When these cells become too irritated and are unable to do their job, mucus begins to develop. This causes the characteristic cough of bronchitis. The cough clears the lungs when the cilia are unable to do their job. Without either of these protective mechanisms, the mucus would settle in the lungs. Then you would develop pneumonia.  Smoking is a common cause of bronchitis and can contribute to pneumonia. Stopping this habit is the single most important thing you can do to help yourself.  TREATMENT   · Your caregiver may prescribe an antibiotic if the cough is caused by bacteria. Also, medicines that open up your airways make it easier to breathe. Your caregiver may also recommend or prescribe an expectorant. It will loosen the mucus to be coughed up. Only take over-the-counter or prescription medicines for pain, discomfort, or fever as directed by your caregiver.  · Removing whatever causes the problem (smoking, for example) is critical to preventing the problem from getting worse.  · Cough suppressants may be prescribed for relief of cough symptoms.  · Inhaled medicines may be prescribed to help with symptoms now and to help prevent problems from returning.  · For those  with recurrent (chronic) bronchitis, there may be a need for steroid medicines.  SEEK IMMEDIATE MEDICAL CARE IF:   · During treatment, you develop more pus-like mucus (purulent sputum).  · You have a fever.  · Your baby is older than 3 months with a rectal temperature of 102° F (38.9° C) or higher.  · Your baby is 3 months old or younger with a rectal temperature of 100.4° F (38° C) or higher.  · You become progressively more ill.  · You have increased difficulty breathing, wheezing, or shortness of breath.  It is necessary to seek immediate medical care if you are elderly or sick from any other disease.  MAKE SURE YOU:   · Understand these instructions.  · Will watch your condition.  · Will get help right away if you are not doing well or get worse.  Document Released: 12/18/2006 Document Revised: 03/11/2013 Document Reviewed: 10/27/2009  [employer]® Patient Information ©2014 [employer], [employer].

## 2019-04-23 ENCOUNTER — TELEPHONE (OUTPATIENT)
Dept: OBGYN | Facility: CLINIC | Age: 24
End: 2019-04-23

## 2019-04-23 NOTE — TELEPHONE ENCOUNTER
Received call from Brandy at [facility] calling regarding Mirena IUD. Call transferred to Wojciech

## 2019-05-29 ENCOUNTER — TELEPHONE (OUTPATIENT)
Dept: MEDICAL GROUP | Facility: IMAGING CENTER | Age: 24
End: 2019-05-29

## 2019-05-29 DIAGNOSIS — K64.4 ANAL SKIN TAG: ICD-10-CM

## 2019-06-15 ENCOUNTER — OFFICE VISIT (OUTPATIENT)
Dept: URGENT CARE | Facility: CLINIC | Age: 24
End: 2019-06-15
Payer: OTHER GOVERNMENT

## 2019-06-15 VITALS
DIASTOLIC BLOOD PRESSURE: 92 MMHG | TEMPERATURE: 98 F | HEART RATE: 86 BPM | WEIGHT: 156 LBS | SYSTOLIC BLOOD PRESSURE: 120 MMHG | OXYGEN SATURATION: 99 % | RESPIRATION RATE: 14 BRPM | HEIGHT: 67 IN | BODY MASS INDEX: 24.48 KG/M2
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
## 2019-06-15 DIAGNOSIS — I88.9 LYMPHADENITIS: ICD-10-CM

## 2019-06-15 PROCEDURE — 99213 OFFICE O/P EST LOW 20 MIN: CPT | Performed by: NURSE PRACTITIONER

## 2019-06-15 RX ORDER — TRIAMCINOLONE ACETONIDE 1 MG/G
1 CREAM TOPICAL 2 TIMES DAILY
Qty: 1 TUBE | Refills: 0 | Status: SHIPPED | OUTPATIENT
Start: 2019-06-15 | End: 2019-06-29

## 2019-06-15 RX ORDER — AMOXICILLIN 500 MG/1
500 CAPSULE ORAL 3 TIMES DAILY
Qty: 21 CAP | Refills: 0 | Status: SHIPPED | OUTPATIENT
Start: 2019-06-15 | End: 2019-06-22

## 2019-06-15 NOTE — PROGRESS NOTES
"Subjective:      Michel Angelo is a 23 y.o. female who presents with Bug Bite (all around body x2days)    Past Medical History:   Diagnosis Date   • NEGATIVE HISTORY OF      Social History     Social History   • Marital status: Single     Spouse name: N/A   • Number of children: N/A   • Years of education: N/A     Occupational History   • [employer]- 10th grade Child     Social History Main Topics   • Smoking status: Never Smoker   • Smokeless tobacco: Never Used   • Alcohol use 0.0 oz/week      Comment: occ   • Drug use: No   • Sexual activity: Yes     Partners: Male     Birth control/ protection: Condom     Other Topics Concern   • Not on file     Social History Narrative    Has 3 sisters. 1 brother.              Family History   Problem Relation Age of Onset   • Hyperlipidemia Mother    • Hyperlipidemia Father    • Cancer Brother 14        adenocarcinoma- lung, carcinoid- appendix   • Genitourinary () Other         cousin   • Diabetes Maternal Grandmother    • Breast Cancer Maternal Grandmother         postmenopausal       Allergie: Gluten meal    Patient is a 23-year-old female who presents today with complaint of multiple bug bites all over she states some of these are mosquitoes and some other stinging insects.  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  States also that she noticed swollen lymph nodes on her neck.  They are somewhat tender.  No sore throat or any other upper respiratory symptoms.         Other    This is a new problem. The current episode started in the past 7 days. The problem occurs constantly. The problem has been unchanged. Associated symptoms include a rash.  Nothing aggravates the symptoms. She has tried nothing for the symptoms.       Review of Systems   Skin: Positive for itching and rash.   All other systems reviewed and are negative.         Objective:     /92   Pulse 86   Temp 36.7 °C (98 °F)   Resp 14   Ht 1.702 m (5' 7\")   Wt 70.8 kg (156 " lb)   SpO2 99%   BMI 24.43 kg/m²       Physical Exam   Constitutional: She appears well-developed and well-nourished.   HENT:   Positive submandibular lymphadenopathy and anterior cervical lymphadenopathy.   Cardiovascular: Normal rate and regular rhythm.    Pulmonary/Chest: Effort normal and breath sounds normal.   Skin: Skin is warm and dry. Rash noted.   Multiple areas of bug bites noted over the neck upper and lower extremities.   Psychiatric: She has a normal mood and affect. Her behavior is normal. Judgment and thought content normal.   Vitals reviewed.              Assessment/Plan:     1. Insect bite, initial encounter  2.  Lymphadenopathy    Amoxicillin; start for continued lymphadenopathy or increasing lymphadenopathy, or any redness or signs of infection around the insect bites  Topical Kenalog as needed  Use topical hydrocortisone on the face if needed  Follow-up for persistent or worsening of symptoms

## 2019-07-24 ENCOUNTER — HOSPITAL ENCOUNTER (EMERGENCY)
Dept: HOSPITAL 8 - ED | Age: 24
Discharge: HOME | End: 2019-07-24
Payer: OTHER GOVERNMENT

## 2019-07-24 VITALS — BODY MASS INDEX: 24.57 KG/M2 | HEIGHT: 67 IN | WEIGHT: 156.53 LBS

## 2019-07-24 VITALS — SYSTOLIC BLOOD PRESSURE: 110 MMHG | DIASTOLIC BLOOD PRESSURE: 62 MMHG
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
## 2019-07-24 DIAGNOSIS — S60.561A: ICD-10-CM

## 2019-07-24 DIAGNOSIS — S00.86XA: Primary | ICD-10-CM

## 2019-07-24 DIAGNOSIS — S80.862A: ICD-10-CM

## 2019-07-24 DIAGNOSIS — S60.862A: ICD-10-CM

## 2019-07-24 DIAGNOSIS — S80.861A: ICD-10-CM

## 2019-07-24 DIAGNOSIS — S60.562A: ICD-10-CM
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
## 2019-07-24 DIAGNOSIS — S60.861A: ICD-10-CM
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
## 2019-07-24 PROCEDURE — 99284 EMERGENCY DEPT VISIT MOD MDM: CPT

## 2019-08-20 ENCOUNTER — APPOINTMENT (OUTPATIENT)
Dept: ADMISSIONS | Facility: MEDICAL CENTER | Age: 24
End: 2019-08-20
Attending: ORTHOPAEDIC SURGERY
Payer: OTHER GOVERNMENT

## 2019-08-20 RX ORDER — M-VIT,TX,IRON,MINS/CALC/FOLIC 27MG-0.4MG
1 TABLET ORAL DAILY
COMMUNITY
End: 2020-02-21

## 2019-08-20 SDOH — HEALTH STABILITY: MENTAL HEALTH: HOW OFTEN DO YOU HAVE A DRINK CONTAINING ALCOHOL?: 2-4 TIMES A MONTH

## 2019-08-20 SDOH — HEALTH STABILITY: MENTAL HEALTH: HOW MANY STANDARD DRINKS CONTAINING ALCOHOL DO YOU HAVE ON A TYPICAL DAY?: 1 OR 2

## 2019-08-20 SDOH — HEALTH STABILITY: MENTAL HEALTH: HOW OFTEN DO YOU HAVE 6 OR MORE DRINKS ON ONE OCCASION?: NEVER

## 2019-08-20 NOTE — OR NURSING
"Verbally reviewed \"Preparing for your procedure\", \"fasting before surgery\", \"Pain management\", and \"patient safety starts with you\" and given written instructions.  Will bring crutches. Instructed to take no meds DOS and remain NPO after midnight.  "

## 2019-08-22 ENCOUNTER — ANESTHESIA EVENT (OUTPATIENT)
Dept: SURGERY | Facility: MEDICAL CENTER | Age: 24
End: 2019-08-22
Payer: OTHER GOVERNMENT

## 2019-08-22 ENCOUNTER — HOSPITAL ENCOUNTER (OUTPATIENT)
Facility: MEDICAL CENTER | Age: 24
End: 2019-08-22
Attending: ORTHOPAEDIC SURGERY | Admitting: ORTHOPAEDIC SURGERY
Payer: OTHER GOVERNMENT

## 2019-08-22 ENCOUNTER — ANESTHESIA (OUTPATIENT)
Dept: SURGERY | Facility: MEDICAL CENTER | Age: 24
End: 2019-08-22
Payer: OTHER GOVERNMENT

## 2019-08-22 VITALS
TEMPERATURE: 97.9 F | BODY MASS INDEX: 25.36 KG/M2 | RESPIRATION RATE: 16 BRPM | WEIGHT: 161.6 LBS | DIASTOLIC BLOOD PRESSURE: 53 MMHG | OXYGEN SATURATION: 98 % | HEIGHT: 67 IN | HEART RATE: 72 BPM | SYSTOLIC BLOOD PRESSURE: 104 MMHG

## 2019-08-22 LAB — HCG UR QL: NEGATIVE

## 2019-08-22 PROCEDURE — 160029 HCHG SURGERY MINUTES - 1ST 30 MINS LEVEL 4: Performed by: ORTHOPAEDIC SURGERY

## 2019-08-22 PROCEDURE — 502580 HCHG PACK, KNEE ARTHROSCOPY: Performed by: ORTHOPAEDIC SURGERY

## 2019-08-22 PROCEDURE — A9270 NON-COVERED ITEM OR SERVICE: HCPCS | Performed by: ANESTHESIOLOGY

## 2019-08-22 PROCEDURE — A6222 GAUZE <=16 IN NO W/SAL W/O B: HCPCS | Performed by: ORTHOPAEDIC SURGERY

## 2019-08-22 PROCEDURE — 160041 HCHG SURGERY MINUTES - EA ADDL 1 MIN LEVEL 4: Performed by: ORTHOPAEDIC SURGERY

## 2019-08-22 PROCEDURE — 700111 HCHG RX REV CODE 636 W/ 250 OVERRIDE (IP): Performed by: ORTHOPAEDIC SURGERY

## 2019-08-22 PROCEDURE — 700111 HCHG RX REV CODE 636 W/ 250 OVERRIDE (IP): Performed by: ANESTHESIOLOGY

## 2019-08-22 PROCEDURE — 160002 HCHG RECOVERY MINUTES (STAT): Performed by: ORTHOPAEDIC SURGERY

## 2019-08-22 PROCEDURE — C1713 ANCHOR/SCREW BN/BN,TIS/BN: HCPCS | Performed by: ORTHOPAEDIC SURGERY

## 2019-08-22 PROCEDURE — 160036 HCHG PACU - EA ADDL 30 MINS PHASE I: Performed by: ORTHOPAEDIC SURGERY

## 2019-08-22 PROCEDURE — 700105 HCHG RX REV CODE 258: Performed by: ORTHOPAEDIC SURGERY

## 2019-08-22 PROCEDURE — 160025 RECOVERY II MINUTES (STATS): Performed by: ORTHOPAEDIC SURGERY

## 2019-08-22 PROCEDURE — 160046 HCHG PACU - 1ST 60 MINS PHASE II: Performed by: ORTHOPAEDIC SURGERY

## 2019-08-22 PROCEDURE — 160048 HCHG OR STATISTICAL LEVEL 1-5: Performed by: ORTHOPAEDIC SURGERY

## 2019-08-22 PROCEDURE — 501838 HCHG SUTURE GENERAL: Performed by: ORTHOPAEDIC SURGERY

## 2019-08-22 PROCEDURE — 160035 HCHG PACU - 1ST 60 MINS PHASE I: Performed by: ORTHOPAEDIC SURGERY

## 2019-08-22 PROCEDURE — 160009 HCHG ANES TIME/MIN: Performed by: ORTHOPAEDIC SURGERY

## 2019-08-22 PROCEDURE — 500028 HCHG ARTHROWAND TURBOVAC 3.5/90 SUCT.: Performed by: ORTHOPAEDIC SURGERY

## 2019-08-22 PROCEDURE — 81025 URINE PREGNANCY TEST: CPT

## 2019-08-22 PROCEDURE — 700105 HCHG RX REV CODE 258: Performed by: ANESTHESIOLOGY

## 2019-08-22 PROCEDURE — 700102 HCHG RX REV CODE 250 W/ 637 OVERRIDE(OP): Performed by: ANESTHESIOLOGY

## 2019-08-22 PROCEDURE — 700101 HCHG RX REV CODE 250: Performed by: ORTHOPAEDIC SURGERY

## 2019-08-22 DEVICE — ANCHOR SUTURE FASTFIX 360 CURVED: Type: IMPLANTABLE DEVICE | Site: KNEE | Status: FUNCTIONAL

## 2019-08-22 RX ORDER — ONDANSETRON 2 MG/ML
4 INJECTION INTRAMUSCULAR; INTRAVENOUS
Status: COMPLETED | OUTPATIENT
Start: 2019-08-22 | End: 2019-08-22

## 2019-08-22 RX ORDER — DIPHENHYDRAMINE HYDROCHLORIDE 50 MG/ML
12.5 INJECTION INTRAMUSCULAR; INTRAVENOUS
Status: DISCONTINUED | OUTPATIENT
Start: 2019-08-22 | End: 2019-08-22 | Stop reason: HOSPADM

## 2019-08-22 RX ORDER — HALOPERIDOL 5 MG/ML
1 INJECTION INTRAMUSCULAR
Status: DISCONTINUED | OUTPATIENT
Start: 2019-08-22 | End: 2019-08-22 | Stop reason: HOSPADM

## 2019-08-22 RX ORDER — DEXAMETHASONE SODIUM PHOSPHATE 4 MG/ML
INJECTION, SOLUTION INTRA-ARTICULAR; INTRALESIONAL; INTRAMUSCULAR; INTRAVENOUS; SOFT TISSUE PRN
Status: DISCONTINUED | OUTPATIENT
Start: 2019-08-22 | End: 2019-08-22 | Stop reason: SURG

## 2019-08-22 RX ORDER — ACETAMINOPHEN 500 MG
1000 TABLET ORAL ONCE
Status: COMPLETED | OUTPATIENT
Start: 2019-08-22 | End: 2019-08-22

## 2019-08-22 RX ORDER — HYDROMORPHONE HYDROCHLORIDE 2 MG/ML
INJECTION, SOLUTION INTRAMUSCULAR; INTRAVENOUS; SUBCUTANEOUS PRN
Status: DISCONTINUED | OUTPATIENT
Start: 2019-08-22 | End: 2019-08-22 | Stop reason: SURG

## 2019-08-22 RX ORDER — HYDROMORPHONE HYDROCHLORIDE 1 MG/ML
0.1 INJECTION, SOLUTION INTRAMUSCULAR; INTRAVENOUS; SUBCUTANEOUS
Status: DISCONTINUED | OUTPATIENT
Start: 2019-08-22 | End: 2019-08-22 | Stop reason: HOSPADM

## 2019-08-22 RX ORDER — HYDROMORPHONE HYDROCHLORIDE 1 MG/ML
0.4 INJECTION, SOLUTION INTRAMUSCULAR; INTRAVENOUS; SUBCUTANEOUS
Status: DISCONTINUED | OUTPATIENT
Start: 2019-08-22 | End: 2019-08-22 | Stop reason: HOSPADM

## 2019-08-22 RX ORDER — IPRATROPIUM BROMIDE AND ALBUTEROL SULFATE 2.5; .5 MG/3ML; MG/3ML
3 SOLUTION RESPIRATORY (INHALATION)
Status: DISCONTINUED | OUTPATIENT
Start: 2019-08-22 | End: 2019-08-22 | Stop reason: HOSPADM

## 2019-08-22 RX ORDER — ROPIVACAINE HYDROCHLORIDE 5 MG/ML
INJECTION, SOLUTION EPIDURAL; INFILTRATION; PERINEURAL
Status: DISCONTINUED | OUTPATIENT
Start: 2019-08-22 | End: 2019-08-22 | Stop reason: HOSPADM

## 2019-08-22 RX ORDER — MEPERIDINE HYDROCHLORIDE 25 MG/ML
12.5 INJECTION INTRAMUSCULAR; INTRAVENOUS; SUBCUTANEOUS
Status: DISCONTINUED | OUTPATIENT
Start: 2019-08-22 | End: 2019-08-22 | Stop reason: HOSPADM

## 2019-08-22 RX ORDER — CEFAZOLIN SODIUM 1 G/3ML
INJECTION, POWDER, FOR SOLUTION INTRAMUSCULAR; INTRAVENOUS PRN
Status: DISCONTINUED | OUTPATIENT
Start: 2019-08-22 | End: 2019-08-22 | Stop reason: SURG

## 2019-08-22 RX ORDER — CELECOXIB 200 MG/1
200 CAPSULE ORAL ONCE
Status: COMPLETED | OUTPATIENT
Start: 2019-08-22 | End: 2019-08-22

## 2019-08-22 RX ORDER — OXYCODONE HCL 5 MG/5 ML
5 SOLUTION, ORAL ORAL
Status: COMPLETED | OUTPATIENT
Start: 2019-08-22 | End: 2019-08-22

## 2019-08-22 RX ORDER — HYDROMORPHONE HYDROCHLORIDE 1 MG/ML
0.2 INJECTION, SOLUTION INTRAMUSCULAR; INTRAVENOUS; SUBCUTANEOUS
Status: DISCONTINUED | OUTPATIENT
Start: 2019-08-22 | End: 2019-08-22 | Stop reason: HOSPADM

## 2019-08-22 RX ORDER — OXYCODONE HCL 5 MG/5 ML
10 SOLUTION, ORAL ORAL
Status: COMPLETED | OUTPATIENT
Start: 2019-08-22 | End: 2019-08-22

## 2019-08-22 RX ORDER — ONDANSETRON 2 MG/ML
INJECTION INTRAMUSCULAR; INTRAVENOUS PRN
Status: DISCONTINUED | OUTPATIENT
Start: 2019-08-22 | End: 2019-08-22 | Stop reason: SURG

## 2019-08-22 RX ORDER — SODIUM CHLORIDE, SODIUM LACTATE, POTASSIUM CHLORIDE, CALCIUM CHLORIDE 600; 310; 30; 20 MG/100ML; MG/100ML; MG/100ML; MG/100ML
INJECTION, SOLUTION INTRAVENOUS CONTINUOUS
Status: DISCONTINUED | OUTPATIENT
Start: 2019-08-22 | End: 2019-08-22 | Stop reason: HOSPADM

## 2019-08-22 RX ADMIN — FENTANYL CITRATE 12.5 MCG: 50 INJECTION, SOLUTION INTRAMUSCULAR; INTRAVENOUS at 10:21

## 2019-08-22 RX ADMIN — ONDANSETRON 4 MG: 2 INJECTION INTRAMUSCULAR; INTRAVENOUS at 11:03

## 2019-08-22 RX ADMIN — ONDANSETRON 4 MG: 2 INJECTION INTRAMUSCULAR; INTRAVENOUS at 08:39

## 2019-08-22 RX ADMIN — HYDROMORPHONE HYDROCHLORIDE 2 MG: 2 INJECTION, SOLUTION INTRAMUSCULAR; INTRAVENOUS; SUBCUTANEOUS at 08:42

## 2019-08-22 RX ADMIN — CEFAZOLIN 2 G: 1 INJECTION, POWDER, FOR SOLUTION INTRAVENOUS at 08:39

## 2019-08-22 RX ADMIN — SODIUM CHLORIDE, POTASSIUM CHLORIDE, SODIUM LACTATE AND CALCIUM CHLORIDE: 600; 310; 30; 20 INJECTION, SOLUTION INTRAVENOUS at 10:17

## 2019-08-22 RX ADMIN — DEXAMETHASONE SODIUM PHOSPHATE 4 MG: 4 INJECTION, SOLUTION INTRAMUSCULAR; INTRAVENOUS at 08:39

## 2019-08-22 RX ADMIN — CELECOXIB 200 MG: 200 CAPSULE ORAL at 08:05

## 2019-08-22 RX ADMIN — OXYCODONE HYDROCHLORIDE 5 MG: 5 SOLUTION ORAL at 09:56

## 2019-08-22 RX ADMIN — PROPOFOL 30 MG: 10 INJECTION, EMULSION INTRAVENOUS at 09:11

## 2019-08-22 RX ADMIN — SODIUM CHLORIDE, POTASSIUM CHLORIDE, SODIUM LACTATE AND CALCIUM CHLORIDE: 600; 310; 30; 20 INJECTION, SOLUTION INTRAVENOUS at 08:27

## 2019-08-22 RX ADMIN — PROPOFOL 200 MG: 10 INJECTION, EMULSION INTRAVENOUS at 08:42

## 2019-08-22 RX ADMIN — FENTANYL CITRATE 25 MCG: 50 INJECTION, SOLUTION INTRAMUSCULAR; INTRAVENOUS at 10:02

## 2019-08-22 RX ADMIN — ACETAMINOPHEN 1000 MG: 500 TABLET, FILM COATED ORAL at 08:05

## 2019-08-22 RX ADMIN — LIDOCAINE HYDROCHLORIDE 100 MG: 20 INJECTION, SOLUTION INFILTRATION; PERINEURAL at 08:42

## 2019-08-22 ASSESSMENT — PAIN SCALES - GENERAL: PAIN_LEVEL: 2

## 2019-08-22 NOTE — OR NURSING
1131: Home care instructions reviewed w/ pt and mother. Questions, concerns addressed. Meets criteria for discharge.

## 2019-08-22 NOTE — ANESTHESIA TIME REPORT
Anesthesia Start and Stop Event Times     Date Time Event    8/22/2019 0826 Ready for Procedure     0839 Anesthesia Start     0929 Anesthesia Stop        Responsible Staff  08/22/19    Name Role Begin End    Yo Leal M.D. Anesth 0839 0929        Preop Diagnosis (Free Text):  Pre-op Diagnosis     TEAR OF MEDIAL MENISCUS RIGHT KNEE WITH PAIN        Preop Diagnosis (Codes):    Post op Diagnosis  Right knee meniscal tear      Premium Reason  Non-Premium    Comments:

## 2019-08-22 NOTE — OR NURSING
"0921 To PACU from OR via gurney, side rails up x 2 for safety, lungs clear bilaterally, scds/teds on patient and machine operational, pt does not arouse to voice or touch, pt requires chin lift for effective ventilations. Dressing CDI to R knee with brace in place, +1 R pedal pulse and pink/warm toes. R knee elevated with gurney and pillow.   0930 Pt arouses spontaneously and able to respond appropriately to RN. Pt reports \"a little bit\" of pain to R knee but \"its ok\". Eyes remain closed when talking. CMS intact to RLE.   0945 Pt arouses intermittently then breathing becomes rhythmic again. Pt reports pain to R knee as 7/10 and \"really starting to hurt\". Plan to give oral PRN pain medication if tolerating sips of water. Pt denies nausea at this time.   1000 Pt awakening more frequently and c/o pain as 7/10 to R knee. Pt denies nausea and tolerating sips of water. Instructed to DB/C, demonstrated undestanding.  1015 Pt rates pain as 4-5/10 to R knee and tolerable. Denies nausea. Remains awake without stimulation.   1020 Pt reports pain \"coming up again\" to R knee to 5-6/10 and states needs a little more pain medication. Plan to give 12.5mg of Fentanyl due to pt also reports \"some dizziness\".   1030 Report to JIMENA Espinal  1043 Assumed care of patient; pt reports pain as tolerable to R knee, denies nausea. C/O of mild itching but transient. Pt agrees that pain is tolerable to go home but will have patient rest prior to transfer to stage II.   1055 Pt reports feeling more awake and pain is tolerable and \"sore\" to R knee. Pt meets criteria for transfer to stage II.     "

## 2019-08-22 NOTE — OP REPORT
DATE OF SERVICE:  08/22/2019    SURGEON:  Vinay Moore MD    ASSISTANT:  Julianna Boone PA-C    ANESTHESIOLOGIST:  Yo Leal MD    ANESTHESIA:  General anesthesia.    PREOPERATIVE DIAGNOSIS:  Right knee medial meniscus tear.    POSTOPERATIVE DIAGNOSES:  1.  Right knee medial meniscus tear.  2.  Right knee synovitis.    PROCEDURES PERFORMED:  1.  Right knee arthroscopy.  2.  Right knee medial meniscus repair.  3.  Right knee extensive synovectomy/medial plica resection.    IMPLANTS:  [employer]hew FAST-FIX All-Inside meniscus repair suture x1.    HISTORY OF PRESENT ILLNESS:  This is a very pleasant and active 23-year-old   female with progressively worsening right knee pain and mechanical symptoms.    MRI demonstrated a likely tear of the medial meniscus.  We discussed surgical   intervention.  The patient has been n.p.o. since midnight.  She is medically   cleared for surgery by the anesthesia team.    INFORMED CONSENT:  The patient was informed of the risks, benefits, and   alternatives of planned operation.  The risks include, but not limited to   bleeding, infection, neurovascular damage, recurrent meniscus tear,   osteoarthritis/cartilage damage, pain, stiffness, DVT, PE, MI, stroke, and   death.  Advanced directives were reviewed.  After answering all questions, the   patient elected to proceed with the planned operation and an informed consent   form was signed.     DESCRIPTION OF PROCEDURE:  The patient was identified in the preoperative   holding area.  The correct procedural side and site were identified and   marked.  The patient was then brought to the operating room and was   transferred to the operating room table where all bony prominences were well   padded.  She underwent general anesthesia.    The right knee was then cleaned with several alcohol-soaked gauzes and joint   injected with 30 mL of 1% lidocaine with epinephrine.  The right lower   extremity was then prepped and  draped in normal standard sterile fashion.    A procedural pause was then performed by the operating room team.  The   procedure, the patient's identity, the operative side, the surgical site, and   the procedure to be performed were all verified.  The patient was given IV   antibiotics prior to incision.    I then began with a diagnostic arthroscopy via standard anteromedial and   anterolateral portals.  No significant cartilage pathology within the   patellofemoral compartment.  There was a small medial plica as well as some   extensive inflammation of the infrapatellar fat pad extending into the   patellofemoral compartment.  No obvious loose bodies or soft debris within the   medial and lateral gutters.  Examination of the notch demonstrated that the   ACL and PCL were intact.  Examination of the medial compartment demonstrated   what appeared to be a small nondisplaced tear of the posterior horn of the   medial meniscus.  It was about a 7-8 mm in length and was at the junction of   the red-red, red-white zone.  The posterior root attachment was stable.  There   was no significant cartilage pathology of the medial femoral condyle and   medial tibial plateau.  Examination of the lateral compartment demonstrated no   obvious tear of the lateral meniscus and no significant cartilage pathology   of the lateral femoral condyle and lateral tibial plateau.    I first performed synovectomy of the infrapatellar fat pad.  I then extended   my synovectomy into the patellofemoral compartment where the prominent medial   plica was resected as well.  I then turned my attention to the medial   compartment.  Given the medial meniscus tear location with a medial meniscus   repair, I first debrided some of the surrounding tissue with the oscillating   suction device.  We then utilized an arthroscopic rasp to stir up some   bleeding tissue.  I then placed a single [employer] Nephew All-Inside FAST-FIX   meniscus suture into the  posterior horn of the medial meniscus.  This resulted   in excellent fixation and stabilization of the meniscus tear.  Probing of the   repaired meniscus demonstrated it was nice and stable.  I then scanned the   entire knee joint again including modified Gillquist maneuvers to confirm   there were no remaining loose bodies or soft tissue debris.  Meticulous   hemostasis obtained.  All instruments were then removed.    The portals were then closed with simple 3-0 Prolene suture followed by   Steri-Strips and Xeroform.  The knee was injected with 30 mL of 0.5%   ropivacaine.  A sterile compressive dressing applied followed by MACHELLE hose   stocking.    Needle and sponge counts were correct at the end of the procedure, as reported   by the circulating nurse.  The patient tolerated the procedure without   complications.  The patient was then transferred off the operating room table   onto the hospital bed.  She was extubated by the anesthesia team.    ESTIMATED BLOOD LOSS:  5 mL    COMPLICATIONS:  None.    TOURNIQUET TIME:  None.    SPECIMENS:  None.    WOUND TYPE:  Type 1 clean.    POSTOPERATIVE PLAN:  The patient will be transferred back to the postoperative   unit.  I expect she will be discharged from the hospital later this morning   once mobilizing safely and tolerating oral medications.  Patient is to remain   touchdown weightbearing on the right lower extremity with the use of crutches   and brace in place.  We will begin some physical therapy in the 5-7 days.  The   patient will take aspirin for pharmacological DVT prophylaxis.       ____________________________________     MD ANGIE Horta / LORETA    DD:  08/22/2019 09:20:05  DT:  08/22/2019 09:58:26    D#:  [id number]  Job#:  [id number]

## 2019-08-22 NOTE — DISCHARGE INSTRUCTIONS
ACTIVITY: Rest and take it easy for the first 24 hours.  A responsible adult is recommended to remain with you during that time.  It is normal to feel sleepy.  We encourage you to not do anything that requires balance, judgment or coordination.    MILD FLU-LIKE SYMPTOMS ARE NORMAL. YOU MAY EXPERIENCE GENERALIZED MUSCLE ACHES, THROAT IRRITATION, HEADACHE AND/OR SOME NAUSEA.    FOR 24 HOURS DO NOT:  Drive, operate machinery or run household appliances.  Drink beer or alcoholic beverages.   Make important decisions or sign legal documents.    SPECIAL INSTRUCTIONS: Follow Dr Moore discharge instructions    DIET: To avoid nausea, slowly advance diet as tolerated, avoiding spicy or greasy foods for the first day.  Add more substantial food to your diet according to your physician's instructions.  INCREASE FLUIDS AND FIBER TO AVOID CONSTIPATION.    FOLLOW-UP APPOINTMENT:  A follow-up appointment should be arranged with your doctor in office as instructed; call to schedule.    You should CALL YOUR PHYSICIAN if you develop:  Fever greater than 101 degrees F.  Pain not relieved by medication, or persistent nausea or vomiting.  Excessive bleeding (blood soaking through dressing) or unexpected drainage from the wound.  Extreme redness or swelling around the incision site, drainage of pus or foul smelling drainage.  Inability to urinate or empty your bladder within 8 hours.  Problems with breathing or chest pain.    You should call 911 if you develop problems with breathing or chest pain.  If you are unable to contact your doctor or surgical center, you should go to the nearest emergency room or urgent care center.  Dr Moore's telephone #: [phone]    If any questions arise, call your doctor.  If your doctor is not available, please feel free to call the Surgical Center at [phone].  The Center is open Monday through Friday from 7AM to 7PM.  You can also call the [facility] HOTLINE open 24 hours/day, 7 days/week  and speak to a nurse at [phone], or toll free at [phone].    A registered nurse may call you a few days after your surgery to see how you are doing after your procedure.    MEDICATIONS: Resume taking daily medication.  Take prescribed pain medication with food.  If no medication is prescribed, you may take non-aspirin pain medication if needed.  PAIN MEDICATION CAN BE VERY CONSTIPATING.  Take a stool softener or laxative such as senokot, pericolace, or milk of magnesia if needed.    Prescription given for Home.  Last pain medication given at 9:56am. Oxycodone 5mg    If your physician has prescribed pain medication that includes Acetaminophen (Tylenol), do not take additional Acetaminophen (Tylenol) while taking the prescribed medication.    Depression / Suicide Risk    As you are discharged from this [facility] facility, it is important to learn how to keep safe from harming yourself.    Recognize the warning signs:  · Abrupt changes in personality, positive or negative- including increase in energy   · Giving away possessions  · Change in eating patterns- significant weight changes-  positive or negative  · Change in sleeping patterns- unable to sleep or sleeping all the time   · Unwillingness or inability to communicate  · Depression  · Unusual sadness, discouragement and loneliness  · Talk of wanting to die  · Neglect of personal appearance   · Rebelliousness- reckless behavior  · Withdrawal from people/activities they love  · Confusion- inability to concentrate     If you or a loved one observes any of these behaviors or has concerns about self-harm, here's what you can do:  · Talk about it- your feelings and reasons for harming yourself  · Remove any means that you might use to hurt yourself (examples: pills, rope, extension cords, firearm)  · Get professional help from the community (Mental Health, Substance Abuse, psychological counseling)  · Do not be alone:Call your Safe Contact- someone  whom you trust who will be there for you.  · Call your local CRISIS HOTLINE 521-4492 or 413-513-7683  · Call your local Children's Mobile Crisis Response Team [place] (845) 874-4922 or www.Yazino  · Call the toll free National Suicide Prevention Hotlines   · National Suicide Prevention Lifeline 894-262-HVAW (3387)  · [employer] Network 800-SUICIDE (766-8930)

## 2019-08-22 NOTE — OR NURSING
0815 Verified NPO status, allergies, pre procedural education discussed. Mother at bedside, supportive. Multimodals taken without difficulty. All questions answered. 0818 Pt transported back to OR.

## 2019-08-22 NOTE — ANESTHESIA PREPROCEDURE EVALUATION
Right knee pain. Healthy. No anesthetic problems in the past.     Relevant Problems   No relevant active problems       Physical Exam    Airway   Mallampati: II  TM distance: >3 FB  Neck ROM: full       Cardiovascular - normal exam  Rhythm: regular  Rate: normal  (-) murmur     Dental - normal exam         Pulmonary - normal exam  Breath sounds clear to auscultation     Abdominal    Neurological - normal exam                 Anesthesia Plan    ASA 1       Plan - general       Airway plan will be LMA        Induction: intravenous    Postoperative Plan: Postoperative administration of opioids is intended.    Pertinent diagnostic labs and testing reviewed    Informed Consent:    Anesthetic plan and risks discussed with patient.    Use of blood products discussed with: patient whom consented to blood products.

## 2019-08-22 NOTE — ANESTHESIA PROCEDURE NOTES
Airway  Date/Time: 8/22/2019 8:43 AM  Performed by: Yo Leal M.D.  Authorized by: Yo Leal M.D.     Location:  OR  Urgency:  Elective  Indications for Airway Management:  Anesthesia  Spontaneous Ventilation: absent    Sedation Level:  Deep  Preoxygenated: Yes    Mask Difficulty Assessment:  0 - not attempted  Final Airway Type:  Supraglottic airway  Final Supraglottic Airway:  Standard LMA  SGA Size:  4  Number of Attempts at Approach:  1

## 2019-08-22 NOTE — ANESTHESIA QCDR
2019 [facility] Clinical Data Registry (for Quality Improvement)     Postoperative nausea/vomiting risk protocol (Adult = 18 yrs and Pediatric 3-17 yrs)- (430 and 463)  General inhalation anesthetic (NOT TIVA) with PONV risk factors: Yes  Provision of anti-emetic therapy with at least 2 different classes of agents: Yes   Patient DID NOT receive anti-emetic therapy and reason is documented in Medical Record:  N/A    Multimodal Pain Management- (AQI59)  Patient undergoing Elective Surgery (i.e. Outpatient, or ASC, or Prescheduled Surgery prior to Hospital Admission): Yes  Use of Multimodal Pain Management, two or more drugs and/or interventions, NOT including systemic opioids: Yes   Exception: Documented allergy to multiple classes of analgesics:  N/A    PACU assessment of acute postoperative pain prior to Anesthesia Care End- Applies to Patients Age = 18- (ABG7)  Initial PACU pain score is which of the following: < 7/10  Patient unable to report pain score: N/A    Post-anesthetic transfer of care checklist/protocol to PACU/ICU- (426 and 427)  Upon conclusion of case, patient transferred to which of the following locations: PACU/Non-ICU  Use of transfer checklist/protocol: Yes  Exclusion: Service Performed in Patient Hospital Room (and thus did not require transfer): N/A    PACU Reintubation- (AQI31)  General anesthesia requiring endotracheal intubation (ETT) along with subsequent extubation in OR or PACU: No  Required reintubation in the PACU: N/A  Extubation was a planned trial documented in the medical record prior to removal of the original airway device: N/A    Unplanned admission to ICU related to anesthesia service up through end of PACU care- (MD51)  Unplanned admission to ICU (not initially anticipated at anesthesia start time): No

## 2019-08-22 NOTE — ANESTHESIA POSTPROCEDURE EVALUATION
Patient: Michel Angelo    Procedure Summary     Date:  08/22/19 Room / Location:   OR  / SURGERY [facility]    Anesthesia Start:  0839 Anesthesia Stop:  0929    Procedures:       ARTHROSCOPY, KNEE (Right Knee)      MENISCECTOMY, KNEE, MEDIAL - PARTIAL VERSUS (Right Knee)      REPAIR, MENISCUS, KNEE - PROCEED AS INDICATED (Right Knee) Diagnosis:  (TEAR OF MEDIAL MENISCUS RIGHT KNEE WITH PAIN)    Surgeon:  Vinay Moore M.D. Responsible Provider:  Yo Leal M.D.    Anesthesia Type:  general ASA Status:  1          Final Anesthesia Type: general  Last vitals  BP   Blood Pressure: 106/59    Temp   36.2 °C (97.2 °F)    Pulse   Pulse: 80, Heart Rate (Monitored): 98   Resp   14    SpO2   98 %      Anesthesia Post Evaluation    Patient location during evaluation: PACU  Patient participation: complete - patient participated  Level of consciousness: awake and alert  Pain score: 2    Airway patency: patent  Anesthetic complications: no  Cardiovascular status: hemodynamically stable  Respiratory status: acceptable  Hydration status: euvolemic    PONV: none           Nurse Pain Score: 3 (NPRS)

## 2019-09-29 ENCOUNTER — OFFICE VISIT (OUTPATIENT)
Dept: URGENT CARE | Facility: CLINIC | Age: 24
End: 2019-09-29
Payer: OTHER GOVERNMENT

## 2019-09-29 VITALS
HEART RATE: 87 BPM | BODY MASS INDEX: 25.11 KG/M2 | DIASTOLIC BLOOD PRESSURE: 74 MMHG | SYSTOLIC BLOOD PRESSURE: 106 MMHG | TEMPERATURE: 98.7 F | OXYGEN SATURATION: 98 % | WEIGHT: 160 LBS | HEIGHT: 67 IN

## 2019-09-29 DIAGNOSIS — J01.00 ACUTE MAXILLARY SINUSITIS, RECURRENCE NOT SPECIFIED: ICD-10-CM

## 2019-09-29 PROCEDURE — 99214 OFFICE O/P EST MOD 30 MIN: CPT | Performed by: PHYSICIAN ASSISTANT

## 2019-09-29 RX ORDER — AMOXICILLIN AND CLAVULANATE POTASSIUM 875; 125 MG/1; MG/1
1 TABLET, FILM COATED ORAL 2 TIMES DAILY
Qty: 20 TAB | Refills: 0 | Status: SHIPPED | OUTPATIENT
Start: 2019-09-29 | End: 2019-10-09

## 2019-09-29 RX ORDER — ONDANSETRON 4 MG/1
TABLET, FILM COATED ORAL
Refills: 0 | COMMUNITY
Start: 2019-08-21 | End: 2020-02-21

## 2019-09-29 ASSESSMENT — ENCOUNTER SYMPTOMS
FOCAL WEAKNESS: 0
NAUSEA: 0
VOMITING: 0
COUGH: 0
CHILLS: 0
MYALGIAS: 0
SORE THROAT: 0
SHORTNESS OF BREATH: 0
DIAPHORESIS: 0
SPUTUM PRODUCTION: 0
SINUS PAIN: 1
SINUS PRESSURE: 1
DIARRHEA: 0
FEVER: 0
WHEEZING: 0
PALPITATIONS: 0
ABDOMINAL PAIN: 0
TINGLING: 0
HEADACHES: 1
SENSORY CHANGE: 0

## 2019-09-29 NOTE — PROGRESS NOTES
Subjective:      Michel Angelo is a 23 y.o. female who presents with Nasal Congestion (x3 days. Sinus congestion, sore throat, ear pressure.)            Sinus Problem   This is a new problem. Episode onset: over 1 week of cold symptoms. Sinus symptoms started 3 days ago. The problem has been gradually worsening since onset. There has been no fever. Associated symptoms include congestion, headaches and sinus pressure. Pertinent negatives include no chills, coughing, diaphoresis, ear pain, shortness of breath, sneezing or sore throat. (Muffled hearing ) Treatments tried: benadryl. The treatment provided no relief.       Past Medical History:   Diagnosis Date   • NEGATIVE HISTORY OF        Past Surgical History:   Procedure Laterality Date   • KNEE ARTHROSCOPY Right 8/22/2019    Procedure: ARTHROSCOPY, KNEE;  Surgeon: Vinay Moore M.D.;  Location: [facility];  Service: Orthopedics   • MEDIAL MENISCECTOMY Right 8/22/2019    Procedure: MENISCECTOMY, KNEE, MEDIAL - PARTIAL VERSUS;  Surgeon: Vinay Moore M.D.;  Location: [facility];  Service: Orthopedics   • MENISCAL REPAIR Right 8/22/2019    Procedure: REPAIR, MENISCUS, KNEE - PROCEED AS INDICATED;  Surgeon: Vinay Moore M.D.;  Location: [facility];  Service: Orthopedics   • RHINOPLASTY  03/2019   • KNEE ARTHROSCOPY Left 05/2018       Family History   Problem Relation Age of Onset   • Hyperlipidemia Mother    • Hyperlipidemia Father    • Cancer Brother 14        adenocarcinoma- lung, carcinoid- appendix   • Genitourinary () Problems Other         cousin   • Diabetes Maternal Grandmother    • Breast Cancer Maternal Grandmother         postmenopausal       Allergies   Allergen Reactions   • Gluten Meal        Medications, Allergies, and current problem list reviewed today in Epic    Review of Systems   Constitutional: Negative for chills, diaphoresis, fever and malaise/fatigue.   HENT:  "Positive for congestion, sinus pressure and sinus pain. Negative for ear discharge, ear pain, sneezing and sore throat.    Respiratory: Negative for cough, sputum production, shortness of breath and wheezing.    Cardiovascular: Negative for chest pain, palpitations and leg swelling.   Gastrointestinal: Negative for abdominal pain, diarrhea, nausea and vomiting.   Musculoskeletal: Negative for myalgias.   Neurological: Positive for headaches. Negative for tingling, sensory change and focal weakness.     All other systems reviewed and are negative.        Objective:     /74   Pulse 87   Temp 37.1 °C (98.7 °F)   Ht 1.702 m (5' 7\")   Wt 72.6 kg (160 lb)   LMP 09/08/2019   SpO2 98%   BMI 25.06 kg/m²      Physical Exam   Constitutional: She is oriented to person, place, and time. She appears well-developed and well-nourished. No distress.   HENT:   Head: Normocephalic and atraumatic.   Right Ear: External ear and ear canal normal. A middle ear effusion is present.   Left Ear: External ear and ear canal normal. A middle ear effusion is present.   Nose: Mucosal edema and rhinorrhea present. Right sinus exhibits maxillary sinus tenderness. Left sinus exhibits maxillary sinus tenderness.   Mouth/Throat: Uvula is midline, oropharynx is clear and moist and mucous membranes are normal.   Eyes: Conjunctivae are normal.   Neck: Neck supple.   Cardiovascular: Normal rate, regular rhythm and normal heart sounds. Exam reveals no gallop and no friction rub.   No murmur heard.  Pulmonary/Chest: Effort normal and breath sounds normal. No respiratory distress. She has no wheezes. She has no rales.   Lymphadenopathy:     She has no cervical adenopathy.   Neurological: She is alert and oriented to person, place, and time. No cranial nerve deficit.   Skin: Skin is warm and dry. No rash noted.   Psychiatric: She has a normal mood and affect. Her behavior is normal. Judgment and thought content normal.             "   Assessment/Plan:     1. Acute maxillary sinusitis, recurrence not specified    - amoxicillin-clavulanate (AUGMENTIN) 875-125 MG Tab; Take 1 Tab by mouth 2 times a day for 10 days.  Dispense: 20 Tab; Refill: 0    Encouraged Flonase, saline rinses, decongestants.     Differential diagnoses, Supportive care, and indications for immediate follow-up discussed with patient.   Instructed to return to clinic or nearest emergency department for any change in condition, further concerns, or worsening of symptoms.    The patient demonstrated a good understanding and agreed with the treatment plan.    Jacinta Metzger P.A.-C.

## 2019-10-31 ENCOUNTER — HOSPITAL ENCOUNTER (OUTPATIENT)
Dept: LAB | Facility: MEDICAL CENTER | Age: 24
End: 2019-10-31
Attending: FAMILY MEDICINE
Payer: OTHER GOVERNMENT

## 2019-10-31 DIAGNOSIS — Z11.1 SCREENING-PULMONARY TB: ICD-10-CM

## 2019-10-31 PROCEDURE — 36415 COLL VENOUS BLD VENIPUNCTURE: CPT

## 2019-10-31 PROCEDURE — 86480 TB TEST CELL IMMUN MEASURE: CPT

## 2019-11-01 LAB
GAMMA INTERFERON BACKGROUND BLD IA-ACNC: 0.08 IU/ML
M TB IFN-G BLD-IMP: NEGATIVE
M TB IFN-G CD4+ BCKGRND COR BLD-ACNC: 0.12 IU/ML
MITOGEN IGNF BCKGRD COR BLD-ACNC: >10 IU/ML
QFT TB2 - NIL TBQ2: 0.17 IU/ML

## 2019-12-19 ENCOUNTER — OFFICE VISIT (OUTPATIENT)
Dept: URGENT CARE | Facility: CLINIC | Age: 24
End: 2019-12-19
Payer: OTHER GOVERNMENT

## 2019-12-19 VITALS
OXYGEN SATURATION: 99 % | DIASTOLIC BLOOD PRESSURE: 60 MMHG | TEMPERATURE: 98.3 F | HEIGHT: 67 IN | SYSTOLIC BLOOD PRESSURE: 102 MMHG | BODY MASS INDEX: 23.54 KG/M2 | RESPIRATION RATE: 16 BRPM | HEART RATE: 109 BPM | WEIGHT: 150 LBS

## 2019-12-19 DIAGNOSIS — J02.9 PHARYNGITIS, UNSPECIFIED ETIOLOGY: ICD-10-CM

## 2019-12-19 DIAGNOSIS — Z20.828 EXPOSURE TO THE FLU: ICD-10-CM

## 2019-12-19 DIAGNOSIS — R68.89 FLU-LIKE SYMPTOMS: ICD-10-CM

## 2019-12-19 PROCEDURE — 99214 OFFICE O/P EST MOD 30 MIN: CPT | Performed by: NURSE PRACTITIONER

## 2019-12-19 PROCEDURE — 87880 STREP A ASSAY W/OPTIC: CPT | Performed by: NURSE PRACTITIONER

## 2019-12-19 PROCEDURE — 87804 INFLUENZA ASSAY W/OPTIC: CPT | Performed by: NURSE PRACTITIONER

## 2019-12-19 RX ORDER — OSELTAMIVIR PHOSPHATE 75 MG/1
75 CAPSULE ORAL 2 TIMES DAILY
Qty: 10 CAP | Refills: 0 | Status: SHIPPED
Start: 2019-12-19 | End: 2020-01-26

## 2019-12-19 ASSESSMENT — ENCOUNTER SYMPTOMS
HEADACHES: 0
COUGH: 0

## 2019-12-20 NOTE — PROGRESS NOTES
Subjective:     Michel Angelo is a 24 y.o. female who presents for Pharyngitis (cold and sore throat started earlier)      Symptoms started today. Brother positive flu. Nephew sick. Sore throat, Congestion, Chills. Chills in back, body aches. Hx strep.     Pharyngitis    This is a new problem. The current episode started today. Neither side of throat is experiencing more pain than the other. There has been no fever. Associated symptoms include congestion. Pertinent negatives include no coughing, ear discharge, ear pain, headaches, shortness of breath or swollen glands. She has had no exposure to strep or mono. She has tried nothing for the symptoms.       Past Medical History:   Diagnosis Date   • NEGATIVE HISTORY OF        Past Surgical History:   Procedure Laterality Date   • KNEE ARTHROSCOPY Right 8/22/2019    Procedure: ARTHROSCOPY, KNEE;  Surgeon: Vinay Moore M.D.;  Location: [facility];  Service: Orthopedics   • MEDIAL MENISCECTOMY Right 8/22/2019    Procedure: MENISCECTOMY, KNEE, MEDIAL - PARTIAL VERSUS;  Surgeon: Vinay Moore M.D.;  Location: [facility];  Service: Orthopedics   • MENISCAL REPAIR Right 8/22/2019    Procedure: REPAIR, MENISCUS, KNEE - PROCEED AS INDICATED;  Surgeon: iVnay Moore M.D.;  Location: [facility];  Service: Orthopedics   • RHINOPLASTY  03/2019   • KNEE ARTHROSCOPY Left 05/2018       Social History     Socioeconomic History   • Marital status: Single     Spouse name: Not on file   • Number of children: Not on file   • Years of education: Not on file   • Highest education level: Not on file   Occupational History   • Occupation: [employer]- 10th grade     Employer: [employer]   Social Needs   • Financial resource strain: Not on file   • Food insecurity:     Worry: Not on file     Inability: Not on file   • Transportation needs:     Medical: Not on file     Non-medical: Not on file   Tobacco Use   • Smoking  "status: Never Smoker   • Smokeless tobacco: Never Used   Substance and Sexual Activity   • Alcohol use: Yes     Alcohol/week: 0.0 oz     Frequency: 2-4 times a month     Drinks per session: 1 or 2     Binge frequency: Never     Comment: 2/month   • Drug use: No   • Sexual activity: Yes     Partners: Male     Birth control/protection: Condom   Lifestyle   • Physical activity:     Days per week: Not on file     Minutes per session: Not on file   • Stress: Not on file   Relationships   • Social connections:     Talks on phone: Not on file     Gets together: Not on file     Attends Hoahaoism service: Not on file     Active member of club or organization: Not on file     Attends meetings of clubs or organizations: Not on file     Relationship status: Not on file   • Intimate partner violence:     Fear of current or ex partner: Not on file     Emotionally abused: Not on file     Physically abused: Not on file     Forced sexual activity: Not on file   Other Topics Concern   • Not on file   Social History Narrative    Has 3 sisters. 1 brother.                 Family History   Problem Relation Age of Onset   • Hyperlipidemia Mother    • Hyperlipidemia Father    • Cancer Brother 14        adenocarcinoma- lung, carcinoid- appendix   • Genitourinary () Problems Other         cousin   • Diabetes Maternal Grandmother    • Breast Cancer Maternal Grandmother         postmenopausal        Allergies   Allergen Reactions   • Gluten Meal        Review of Systems   Constitutional: Positive for malaise/fatigue.   HENT: Positive for congestion and sore throat. Negative for ear discharge and ear pain.    Respiratory: Negative for cough and shortness of breath.    Skin: Negative for rash.   Neurological: Negative for headaches.   All other systems reviewed and are negative.       Objective:   /60   Pulse (!) 109   Temp 36.8 °C (98.3 °F)   Resp 16   Ht 1.702 m (5' 7\")   Wt 68 kg (150 lb)   SpO2 99%   BMI 23.49 kg/m² "     Physical Exam  Vitals signs reviewed.   Constitutional:       General: She is not in acute distress.     Appearance: She is well-developed.   HENT:      Head: Normocephalic and atraumatic.      Right Ear: Tympanic membrane, ear canal and external ear normal.      Left Ear: Tympanic membrane, ear canal and external ear normal.      Nose: Mucosal edema present.      Right Sinus: No maxillary sinus tenderness or frontal sinus tenderness.      Left Sinus: No maxillary sinus tenderness or frontal sinus tenderness.      Mouth/Throat:      Mouth: Mucous membranes are moist.      Pharynx: Posterior oropharyngeal erythema present. No oropharyngeal exudate.   Eyes:      Conjunctiva/sclera: Conjunctivae normal.      Pupils: Pupils are equal, round, and reactive to light.   Neck:      Musculoskeletal: Normal range of motion.   Cardiovascular:      Rate and Rhythm: Regular rhythm. Tachycardia present.      Heart sounds: Normal heart sounds.   Pulmonary:      Effort: Pulmonary effort is normal.      Breath sounds: Normal breath sounds.   Musculoskeletal: Normal range of motion.   Lymphadenopathy:      Head:      Right side of head: No submental, submandibular, tonsillar, preauricular, posterior auricular or occipital adenopathy.      Left side of head: No submental, submandibular, tonsillar, preauricular, posterior auricular or occipital adenopathy.   Skin:     General: Skin is warm and dry.      Findings: No rash.   Neurological:      General: No focal deficit present.      Mental Status: She is alert and oriented to person, place, and time.      GCS: GCS eye subscore is 4. GCS verbal subscore is 5. GCS motor subscore is 6.   Psychiatric:         Mood and Affect: Mood normal.         Speech: Speech normal.         Behavior: Behavior normal.         Thought Content: Thought content normal.         Judgment: Judgment normal.         Assessment/Plan:   1. Flu-like symptoms  - oseltamivir (TAMIFLU) 75 MG Cap; Take 1 Cap by mouth  2 times a day.  Dispense: 10 Cap; Refill: 0    2. Exposure to the flu  - oseltamivir (TAMIFLU) 75 MG Cap; Take 1 Cap by mouth 2 times a day.  Dispense: 10 Cap; Refill: 0    3. Pharyngitis, unspecified etiology  - POCT Influenza A/B  - POCT Rapid Strep A    Results for orders placed or performed in visit on 12/19/19   POCT Influenza A/B   Result Value Ref Range    Rapid Influenza A-B NEGATIVE     Internal Control Positive Valid     Internal Control Negative Valid    POCT Rapid Strep A   Result Value Ref Range    Rapid Strep Screen NEGATIVE     Internal Control Positive Valid     Internal Control Negative Valid    -Discussed viral etiology of Influenza.     Symptomatic care.  -Oral hydration and rest.   -Over the counter supressant as directed.  -Diphenhydramine as directed for rhinorrhea (runny nose) and sneezing.  -May take over the counter pseudoephedrine for nasal congestion. Can increase your blood pressure.  -Tylenol or ibuprofen for pain and fever as directed. In children, Avoid Aspirin.   -Saline nasal spray as a decongestant.  -Infection control measures at home. Hand washing, covering sneeze/cough.  -Remain home from work, school, and other populated environments until at least 24 hours after you no longer have a fever.     Discussed associated complications, including risk of pneumonia and ear infections. Follow up with primary care provider. Follow up urgently for worsening symptoms, ear pain or drainage, shortness of breath, abdominal pain, or any other concerns. Follow up emergently for trouble breathing, elevated heart rate, chest pain, signs of dehydration, dizziness, weakness, decreased urine output, confusion, persistent vomiting, severe headache, neck stiffness, persistent high grade fever.    Differential diagnosis, natural history, supportive care, and indications for immediate follow-up discussed.

## 2019-12-20 NOTE — PATIENT INSTRUCTIONS
Symptomatic care.  -Oral hydration and rest.   -Over the counter supressant as directed.  -Diphenhydramine as directed for rhinorrhea (runny nose) and sneezing.  -May take over the counter pseudoephedrine for nasal congestion. Can increase your blood pressure.  -Tylenol or ibuprofen for pain and fever as directed. In children, Avoid Aspirin.   -Saline nasal spray as a decongestant.  -Infection control measures at home. Hand washing, covering sneeze/cough.  -Remain home from work, school, and other populated environments until at least 24 hours after you no longer have a fever.     Discussed associated complications, including risk of pneumonia and ear infections. Follow up with primary care provider. Follow up urgently for worsening symptoms, ear pain or drainage, shortness of breath, abdominal pain, or any other concerns. Follow up emergently for trouble breathing, elevated heart rate, chest pain, signs of dehydration, dizziness, weakness, decreased urine output, confusion, persistent vomiting, severe headache, neck stiffness, persistent high grade fever.      Influenza, Adult  Influenza, more commonly known as “the flu,” is a viral infection that primarily affects the respiratory tract. The respiratory tract includes organs that help you breathe, such as the lungs, nose, and throat. The flu causes many common cold symptoms, as well as a high fever and body aches.  The flu spreads easily from person to person (is contagious). Getting a flu shot (influenza vaccination) every year is the best way to prevent influenza.  What are the causes?  Influenza is caused by a virus. You can catch the virus by:  · Breathing in droplets from an infected person's cough or sneeze.  · Touching something that was recently contaminated with the virus and then touching your mouth, nose, or eyes.  What increases the risk?  The following factors may make you more likely to get the flu:  · Not cleaning your hands frequently with soap  and water or alcohol-based hand .  · Having close contact with many people during cold and flu season.  · Touching your mouth, eyes, or nose without washing or sanitizing your hands first.  · Not drinking enough fluids or not eating a healthy diet.  · Not getting enough sleep or exercise.  · Being under a high amount of stress.  · Not getting a yearly (annual) flu shot.  You may be at a higher risk of complications from the flu, such as a severe lung infection (pneumonia), if you:  · Are over the age of 65.  · Are pregnant.  · Have a weakened disease-fighting system (immune system). You may have a weakened immune system if you:  ¨ Have HIV or AIDS.  ¨ Are undergoing chemotherapy.  ¨ Are taking medicines that reduce the activity of (suppress) the immune system.  · Have a long-term (chronic) illness, such as heart disease, kidney disease, diabetes, or lung disease.  · Have a liver disorder.  · Are obese.  · Have anemia.  What are the signs or symptoms?  Symptoms of this condition typically last 4-10 days and may include:  · Fever.  · Chills.  · Headache, body aches, or muscle aches.  · Sore throat.  · Cough.  · Runny or congested nose.  · Chest discomfort and cough.  · Poor appetite.  · Weakness or tiredness (fatigue).  · Dizziness.  · Nausea or vomiting.  How is this diagnosed?  This condition may be diagnosed based on your medical history and a physical exam. Your health care provider may do a nose or throat swab test to confirm the diagnosis.  How is this treated?  If influenza is detected early, you can be treated with antiviral medicine that can reduce the length of your illness and the severity of your symptoms. This medicine may be given by mouth (orally) or through an IV tube that is inserted in one of your veins.  The goal of treatment is to relieve symptoms by taking care of yourself at home. This may include taking over-the-counter medicines, drinking plenty of fluids, and adding humidity to the  air in your home.  In some cases, influenza goes away on its own. Severe influenza or complications from influenza may be treated in a hospital.  Follow these instructions at home:  · Take over-the-counter and prescription medicines only as told by your health care provider.  · Use a cool mist humidifier to add humidity to the air in your home. This can make breathing easier.  · Rest as needed.  · Drink enough fluid to keep your urine clear or pale yellow.  · Cover your mouth and nose when you cough or sneeze.  · Wash your hands with soap and water often, especially after you cough or sneeze. If soap and water are not available, use hand .  · Stay home from work or school as told by your health care provider. Unless you are visiting your health care provider, try to avoid leaving home until your fever has been gone for 24 hours without the use of medicine.  · Keep all follow-up visits as told by your health care provider. This is important.  How is this prevented?  · Getting an annual flu shot is the best way to avoid getting the flu. You may get the flu shot in late summer, fall, or winter. Ask your health care provider when you should get your flu shot.  · Wash your hands often or use hand  often.  · Avoid contact with people who are sick during cold and flu season.  · Eat a healthy diet, drink plenty of fluids, get enough sleep, and exercise regularly.  Contact a health care provider if:  · You develop new symptoms.  · You have:  ¨ Chest pain.  ¨ Diarrhea.  ¨ A fever.  · Your cough gets worse.  · You produce more mucus.  · You feel nauseous or you vomit.  Get help right away if:  · You develop shortness of breath or difficulty breathing.  · Your skin or nails turn a bluish color.  · You have severe pain or stiffness in your neck.  · You develop a sudden headache or sudden pain in your face or ear.  · You cannot stop vomiting.  This information is not intended to replace advice given to you by  your health care provider. Make sure you discuss any questions you have with your health care provider.  Document Released: 12/15/2001 Document Revised: 05/25/2017 Document Reviewed: 10/11/2016  [employer] Interactive Patient Education © 2017 [employer]

## 2019-12-23 ASSESSMENT — ENCOUNTER SYMPTOMS
SHORTNESS OF BREATH: 0
SWOLLEN GLANDS: 0
SORE THROAT: 1

## 2020-01-09 ENCOUNTER — OFFICE VISIT (OUTPATIENT)
Dept: URGENT CARE | Facility: CLINIC | Age: 25
End: 2020-01-09
Payer: OTHER GOVERNMENT

## 2020-01-09 VITALS
RESPIRATION RATE: 18 BRPM | DIASTOLIC BLOOD PRESSURE: 70 MMHG | WEIGHT: 150 LBS | SYSTOLIC BLOOD PRESSURE: 112 MMHG | OXYGEN SATURATION: 95 % | TEMPERATURE: 98.6 F | BODY MASS INDEX: 23.49 KG/M2 | HEART RATE: 82 BPM

## 2020-01-09 DIAGNOSIS — J01.00 ACUTE NON-RECURRENT MAXILLARY SINUSITIS: ICD-10-CM

## 2020-01-09 DIAGNOSIS — J04.0 ACUTE LARYNGITIS: ICD-10-CM

## 2020-01-09 DIAGNOSIS — H65.193 ACUTE EFFUSION OF BOTH MIDDLE EARS: ICD-10-CM

## 2020-01-09 PROCEDURE — 99214 OFFICE O/P EST MOD 30 MIN: CPT | Performed by: NURSE PRACTITIONER

## 2020-01-09 RX ORDER — ECHINACEA PURPUREA EXTRACT 125 MG
2 TABLET ORAL
Qty: 1 BOTTLE | Refills: 0 | Status: SHIPPED
Start: 2020-01-09 | End: 2020-01-26

## 2020-01-09 RX ORDER — FLUTICASONE PROPIONATE 50 MCG
2 SPRAY, SUSPENSION (ML) NASAL DAILY
Qty: 1 BOTTLE | Refills: 0 | Status: SHIPPED
Start: 2020-01-09 | End: 2020-02-21

## 2020-01-09 RX ORDER — AMOXICILLIN AND CLAVULANATE POTASSIUM 875; 125 MG/1; MG/1
1 TABLET, FILM COATED ORAL 2 TIMES DAILY
Qty: 14 TAB | Refills: 0 | Status: SHIPPED | OUTPATIENT
Start: 2020-01-09 | End: 2020-01-16

## 2020-01-09 ASSESSMENT — ENCOUNTER SYMPTOMS
HEMOPTYSIS: 0
SORE THROAT: 1
EYE PAIN: 0
STRIDOR: 0
FEVER: 0
COUGH: 1
SHORTNESS OF BREATH: 0
WHEEZING: 1
EYE DISCHARGE: 1
BLURRED VISION: 0
RHINORRHEA: 1
SPUTUM PRODUCTION: 1

## 2020-01-09 NOTE — PATIENT INSTRUCTIONS
Sinusitis, Adult  Sinusitis is soreness and inflammation of your sinuses. Sinuses are hollow spaces in the bones around your face. Your sinuses are located:  · Around your eyes.  · In the middle of your forehead.  · Behind your nose.  · In your cheekbones.  Your sinuses and nasal passages are lined with a stringy fluid (mucus). Mucus normally drains out of your sinuses. When your nasal tissues become inflamed or swollen, the mucus can become trapped or blocked so air cannot flow through your sinuses. This allows bacteria, viruses, and funguses to grow, which leads to infection.  Sinusitis can develop quickly and last for 7?10 days (acute) or for more than 12 weeks (chronic). Sinusitis often develops after a cold.  What are the causes?  This condition is caused by anything that creates swelling in the sinuses or stops mucus from draining, including:  · Allergies.  · Asthma.  · Bacterial or viral infection.  · Abnormally shaped bones between the nasal passages.  · Nasal growths that contain mucus (nasal polyps).  · Narrow sinus openings.  · Pollutants, such as chemicals or irritants in the air.  · A foreign object stuck in the nose.  · A fungal infection. This is rare.  What increases the risk?  The following factors may make you more likely to develop this condition:  · Having allergies or asthma.  · Having had a recent cold or respiratory tract infection.  · Having structural deformities or blockages in your nose or sinuses.  · Having a weak immune system.  · Doing a lot of swimming or diving.  · Overusing nasal sprays.  · Smoking.  What are the signs or symptoms?  The main symptoms of this condition are pain and a feeling of pressure around the affected sinuses. Other symptoms include:  · Upper toothache.  · Earache.  · Headache.  · Bad breath.  · Decreased sense of smell and taste.  · A cough that may get worse at night.  · Fatigue.  · Fever.  · Thick drainage from your nose. The drainage is often green and it may  contain pus (purulent).  · Stuffy nose or congestion.  · Postnasal drip. This is when extra mucus collects in the throat or back of the nose.  · Swelling and warmth over the affected sinuses.  · Sore throat.  · Sensitivity to light.  How is this diagnosed?  This condition is diagnosed based on symptoms, a medical history, and a physical exam. To find out if your condition is acute or chronic, your health care provider may:  · Look in your nose for signs of nasal polyps.  · Tap over the affected sinus to check for signs of infection.  · View the inside of your sinuses using an imaging device that has a light attached (endoscope).  If your health care provider suspects that you have chronic sinusitis, you may also:  · Be tested for allergies.  · Have a sample of mucus taken from your nose (nasal culture) and checked for bacteria.  · Have a mucus sample examined to see if your sinusitis is related to an allergy.  If your sinusitis does not respond to treatment and it lasts longer than 8 weeks, you may have an MRI or CT scan to check your sinuses. These scans also help to determine how severe your infection is.  In rare cases, a bone biopsy may be done to rule out more serious types of fungal sinus disease.  How is this treated?  Treatment for sinusitis depends on the cause and whether your condition is chronic or acute. If a virus is causing your sinusitis, your symptoms will go away on their own within 10 days. You may be given medicines to relieve your symptoms, including:  · Topical nasal decongestants. They shrink swollen nasal passages and let mucus drain from your sinuses.  · Antihistamines. These drugs block inflammation that is triggered by allergies. This can help to ease swelling in your nose and sinuses.  · Topical nasal corticosteroids. These are nasal sprays that ease inflammation and swelling in your nose and sinuses.  · Nasal saline washes. These rinses can help to get rid of thick mucus in your  nose.  If your condition is caused by bacteria, you will be given an antibiotic medicine. If your condition is caused by a fungus, you will be given an antifungal medicine.  Surgery may be needed to correct underlying conditions, such as narrow nasal passages. Surgery may also be needed to remove polyps.  Follow these instructions at home:  Medicines  · Take, use, or apply over-the-counter and prescription medicines only as told by your health care provider. These may include nasal sprays.  · If you were prescribed an antibiotic medicine, take it as told by your health care provider. Do not stop taking the antibiotic even if you start to feel better.  Hydrate and Humidify  · Drink enough water to keep your urine clear or pale yellow. Staying hydrated will help to thin your mucus.  · Use a cool mist humidifier to keep the humidity level in your home above 50%.  · Inhale steam for 10-15 minutes, 3-4 times a day or as told by your health care provider. You can do this in the bathroom while a hot shower is running.  · Limit your exposure to cool or dry air.  Rest  · Rest as much as possible.  · Sleep with your head raised (elevated).  · Make sure to get enough sleep each night.  General instructions  · Apply a warm, moist washcloth to your face 3-4 times a day or as told by your health care provider. This will help with discomfort.  · Wash your hands often with soap and water to reduce your exposure to viruses and other germs. If soap and water are not available, use hand .  · Do not smoke. Avoid being around people who are smoking (secondhand smoke).  · Keep all follow-up visits as told by your health care provider. This is important.  Contact a health care provider if:  · You have a fever.  · Your symptoms get worse.  · Your symptoms do not improve within 10 days.  Get help right away if:  · You have a severe headache.  · You have persistent vomiting.  · You have pain or swelling around your face or  eyes.  · You have vision problems.  · You develop confusion.  · Your neck is stiff.  · You have trouble breathing.  This information is not intended to replace advice given to you by your health care provider. Make sure you discuss any questions you have with your health care provider.  Document Released: 12/18/2006 Document Revised: 08/13/2017 Document Reviewed: 10/12/2016  [employer] Interactive Patient Education © 2017 [employer]    Pharyngitis  Pharyngitis is redness, pain, and swelling (inflammation) of your pharynx.  What are the causes?  Pharyngitis is usually caused by infection. Most of the time, these infections are from viruses (viral) and are part of a cold. However, sometimes pharyngitis is caused by bacteria (bacterial). Pharyngitis can also be caused by allergies. Viral pharyngitis may be spread from person to person by coughing, sneezing, and personal items or utensils (cups, forks, spoons, toothbrushes). Bacterial pharyngitis may be spread from person to person by more intimate contact, such as kissing.  What are the signs or symptoms?  Symptoms of pharyngitis include:  · Sore throat.  · Tiredness (fatigue).  · Low-grade fever.  · Headache.  · Joint pain and muscle aches.  · Skin rashes.  · Swollen lymph nodes.  · Plaque-like film on throat or tonsils (often seen with bacterial pharyngitis).  How is this diagnosed?  Your health care provider will ask you questions about your illness and your symptoms. Your medical history, along with a physical exam, is often all that is needed to diagnose pharyngitis. Sometimes, a rapid strep test is done. Other lab tests may also be done, depending on the suspected cause.  How is this treated?  Viral pharyngitis will usually get better in 3-4 days without the use of medicine. Bacterial pharyngitis is treated with medicines that kill germs (antibiotics).  Follow these instructions at home:  · Drink enough water and fluids to keep your urine clear or pale  yellow.  · Only take over-the-counter or prescription medicines as directed by your health care provider:  ¨ If you are prescribed antibiotics, make sure you finish them even if you start to feel better.  ¨ Do not take aspirin.  · Get lots of rest.  · Gargle with 8 oz of salt water (½ tsp of salt per 1 qt of water) as often as every 1-2 hours to soothe your throat.  · Throat lozenges (if you are not at risk for choking) or sprays may be used to soothe your throat.  Contact a health care provider if:  · You have large, tender lumps in your neck.  · You have a rash.  · You cough up green, yellow-brown, or bloody spit.  Get help right away if:  · Your neck becomes stiff.  · You drool or are unable to swallow liquids.  · You vomit or are unable to keep medicines or liquids down.  · You have severe pain that does not go away with the use of recommended medicines.  · You have trouble breathing (not caused by a stuffy nose).  This information is not intended to replace advice given to you by your health care provider. Make sure you discuss any questions you have with your health care provider.  Document Released: 12/18/2006 Document Revised: 05/25/2017 Document Reviewed: 08/25/2014  [employer] Interactive Patient Education © 2017 [employer]

## 2020-01-09 NOTE — PROGRESS NOTES
Subjective:     Michel Angelo is a 24 y.o. female who presents for Hoarse (almost a week wet cough, couple daus hoarseness, today right earache)      Never fully got better from the flu. Hoarse voice. No current sore throat. Clear yellow eye drainage. Bump behind rt eat, slight tenderness. No fever. Congested. Green sputum. Had right throat pain. Dayquil. Does not feel like strep throat.     Cough   This is a new problem. The current episode started in the past 7 days. The problem has been gradually worsening. The cough is productive of sputum. Associated symptoms include ear pain, nasal congestion, rhinorrhea, a sore throat and wheezing. Pertinent negatives include no fever, hemoptysis, rash or shortness of breath. Nothing aggravates the symptoms. The treatment provided mild relief.       Past Medical History:   Diagnosis Date   • NEGATIVE HISTORY OF        Past Surgical History:   Procedure Laterality Date   • KNEE ARTHROSCOPY Right 8/22/2019    Procedure: ARTHROSCOPY, KNEE;  Surgeon: Vinay Moore M.D.;  Location: [facility];  Service: Orthopedics   • MEDIAL MENISCECTOMY Right 8/22/2019    Procedure: MENISCECTOMY, KNEE, MEDIAL - PARTIAL VERSUS;  Surgeon: Vinay Moore M.D.;  Location: [facility];  Service: Orthopedics   • MENISCAL REPAIR Right 8/22/2019    Procedure: REPAIR, MENISCUS, KNEE - PROCEED AS INDICATED;  Surgeon: Vinay Moore M.D.;  Location: [facility];  Service: Orthopedics   • RHINOPLASTY  03/2019   • KNEE ARTHROSCOPY Left 05/2018       Social History     Socioeconomic History   • Marital status: Single     Spouse name: Not on file   • Number of children: Not on file   • Years of education: Not on file   • Highest education level: Not on file   Occupational History   • Occupation: [employer]- 10th grade     Employer: [employer]   Social Needs   • Financial resource strain: Not on file   • Food insecurity:     Worry: Not on file      Inability: Not on file   • Transportation needs:     Medical: Not on file     Non-medical: Not on file   Tobacco Use   • Smoking status: Never Smoker   • Smokeless tobacco: Never Used   Substance and Sexual Activity   • Alcohol use: Yes     Alcohol/week: 0.0 oz     Frequency: 2-4 times a month     Drinks per session: 1 or 2     Binge frequency: Never     Comment: 2/month   • Drug use: No   • Sexual activity: Yes     Partners: Male     Birth control/protection: Condom   Lifestyle   • Physical activity:     Days per week: Not on file     Minutes per session: Not on file   • Stress: Not on file   Relationships   • Social connections:     Talks on phone: Not on file     Gets together: Not on file     Attends [employer] service: Not on file     Active member of club or organization: Not on file     Attends meetings of clubs or organizations: Not on file     Relationship status: Not on file   • Intimate partner violence:     Fear of current or ex partner: Not on file     Emotionally abused: Not on file     Physically abused: Not on file     Forced sexual activity: Not on file   Other Topics Concern   • Not on file   Social History Narrative    Has 3 sisters. 1 brother.                 Family History   Problem Relation Age of Onset   • Hyperlipidemia Mother    • Hyperlipidemia Father    • Cancer Brother 14        adenocarcinoma- lung, carcinoid- appendix   • Genitourinary () Problems Other         cousin   • Diabetes Maternal Grandmother    • Breast Cancer Maternal Grandmother         postmenopausal        Allergies   Allergen Reactions   • Gluten Meal        Review of Systems   Constitutional: Negative for fever.   HENT: Positive for congestion, ear pain, rhinorrhea and sore throat. Negative for ear discharge.    Eyes: Positive for discharge. Negative for blurred vision and pain.   Respiratory: Positive for cough, sputum production and wheezing. Negative for hemoptysis, shortness of breath and stridor.    Skin:  Negative for rash.   All other systems reviewed and are negative.       Objective:   /70   Pulse 82   Temp 37 °C (98.6 °F) (Temporal)   Resp 18   Wt 68 kg (150 lb)   SpO2 95%   BMI 23.49 kg/m²     Physical Exam  Vitals signs reviewed.   Constitutional:       General: She is not in acute distress.     Appearance: She is well-developed.   HENT:      Head: Normocephalic and atraumatic.      Right Ear: Ear canal and external ear normal. No drainage, swelling or tenderness. A middle ear effusion is present. No mastoid tenderness. No hemotympanum. Tympanic membrane is not injected, perforated, erythematous, retracted or bulging.      Left Ear: Ear canal and external ear normal. No drainage, swelling or tenderness. A middle ear effusion is present. No mastoid tenderness. No hemotympanum. Tympanic membrane is not injected, perforated, erythematous, retracted or bulging.      Nose: Mucosal edema present.      Right Sinus: Maxillary sinus tenderness present. No frontal sinus tenderness.      Left Sinus: Maxillary sinus tenderness present. No frontal sinus tenderness.      Mouth/Throat:      Mouth: Mucous membranes are moist.      Pharynx: Uvula midline. Posterior oropharyngeal erythema present. No oropharyngeal exudate or uvula swelling.   Eyes:      Extraocular Movements: Extraocular movements intact.      Conjunctiva/sclera: Conjunctivae normal.      Pupils: Pupils are equal, round, and reactive to light.   Neck:      Musculoskeletal: Full passive range of motion without pain, normal range of motion and neck supple. No edema, erythema, neck rigidity or muscular tenderness.   Cardiovascular:      Rate and Rhythm: Normal rate and regular rhythm.      Heart sounds: Normal heart sounds.   Pulmonary:      Effort: Pulmonary effort is normal. No respiratory distress.      Breath sounds: Normal breath sounds. No decreased breath sounds, wheezing, rhonchi or rales.   Musculoskeletal: Normal range of motion.    Lymphadenopathy:      Head:      Right side of head: Posterior auricular adenopathy present. No submental, submandibular, tonsillar, preauricular or occipital adenopathy.      Left side of head: No submental, submandibular, tonsillar, preauricular, posterior auricular or occipital adenopathy.   Skin:     General: Skin is warm and dry.      Findings: No rash.   Neurological:      General: No focal deficit present.      Mental Status: She is alert and oriented to person, place, and time.      GCS: GCS eye subscore is 4. GCS verbal subscore is 5. GCS motor subscore is 6.      Motor: Motor function is intact.   Psychiatric:         Mood and Affect: Mood normal.         Speech: Speech normal.         Behavior: Behavior normal.         Thought Content: Thought content normal.         Judgment: Judgment normal.         Assessment/Plan:   1. Acute non-recurrent maxillary sinusitis  - amoxicillin-clavulanate (AUGMENTIN) 875-125 MG Tab; Take 1 Tab by mouth 2 times a day for 7 days.  Dispense: 14 Tab; Refill: 0  - sodium chloride (OCEAN NASAL SPRAY) 0.65 % Solution; Spray 2 Sprays in nose every 2 hours as needed for Congestion.  Dispense: 1 Bottle; Refill: 0  - fluticasone (FLONASE) 50 MCG/ACT nasal spray; Spray 2 Sprays in nose every day.  Dispense: 1 Bottle; Refill: 0    2. Acute effusion of both middle ears    3. Acute laryngitis    -Nasal spray and allergy medications as directed (Zyrtec or Loratadine).  -You may try saline irrigation or neti pot.   -Drink plenty of fluids.  -Ibuprofen or Tylenol as directed for pain.   -Warm compress to sinuses.      Follow up with primary care provider. Urgently for worsening symptoms, persistent fevers, facial swelling, visual changes, weakness, elevated heart rate, stiff neck, symptoms last longer than 10 days, or any other concerns.    Monitor lymph swelling behind ear. Follow up if symptoms persist.    Differential diagnosis, natural history, supportive care, and indications for  immediate follow-up discussed.

## 2020-01-24 ENCOUNTER — OFFICE VISIT (OUTPATIENT)
Dept: URGENT CARE | Facility: CLINIC | Age: 25
End: 2020-01-24
Payer: OTHER GOVERNMENT

## 2020-01-24 VITALS
DIASTOLIC BLOOD PRESSURE: 62 MMHG | TEMPERATURE: 98.9 F | HEIGHT: 67 IN | WEIGHT: 150 LBS | HEART RATE: 88 BPM | SYSTOLIC BLOOD PRESSURE: 110 MMHG | OXYGEN SATURATION: 99 % | BODY MASS INDEX: 23.54 KG/M2 | RESPIRATION RATE: 12 BRPM

## 2020-01-24 DIAGNOSIS — J02.9 SORE THROAT: ICD-10-CM

## 2020-01-24 DIAGNOSIS — J40 BRONCHITIS: ICD-10-CM

## 2020-01-24 DIAGNOSIS — J01.00 ACUTE MAXILLARY SINUSITIS, RECURRENCE NOT SPECIFIED: ICD-10-CM

## 2020-01-24 DIAGNOSIS — J06.9 ACUTE URI: ICD-10-CM

## 2020-01-24 DIAGNOSIS — R05.9 COUGH: ICD-10-CM

## 2020-01-24 PROCEDURE — 99214 OFFICE O/P EST MOD 30 MIN: CPT | Performed by: NURSE PRACTITIONER

## 2020-01-24 RX ORDER — ALBUTEROL SULFATE 90 UG/1
2 AEROSOL, METERED RESPIRATORY (INHALATION) EVERY 6 HOURS PRN
Qty: 8.5 G | Refills: 0 | Status: SHIPPED
Start: 2020-01-24 | End: 2020-02-21

## 2020-01-24 RX ORDER — ALBUTEROL SULFATE 90 UG/1
2 AEROSOL, METERED RESPIRATORY (INHALATION) EVERY 6 HOURS PRN
Qty: 8.5 G | Refills: 0 | Status: SHIPPED
Start: 2020-01-24 | End: 2020-01-24 | Stop reason: CLARIF

## 2020-01-24 ASSESSMENT — ENCOUNTER SYMPTOMS
SPUTUM PRODUCTION: 1
WHEEZING: 1
SHORTNESS OF BREATH: 1
RHINORRHEA: 1
CHILLS: 1
COUGH: 1
HEADACHES: 1
SINUS PAIN: 1
SORE THROAT: 1

## 2020-01-24 NOTE — PROGRESS NOTES
Subjective:      Michel Angelo is a 24 y.o. female who presents with Sore Throat; Otalgia; Sinus Problem (x2 weeks ); and Cough    Past Medical History:   Diagnosis Date   • NEGATIVE HISTORY OF      Social History     Socioeconomic History   • Marital status: Single     Spouse name: Not on file   • Number of children: Not on file   • Years of education: Not on file   • Highest education level: Not on file   Occupational History   • Occupation: [employer]- 10th grade     Employer: [employer]   Social Needs   • Financial resource strain: Not on file   • Food insecurity:     Worry: Not on file     Inability: Not on file   • Transportation needs:     Medical: Not on file     Non-medical: Not on file   Tobacco Use   • Smoking status: Never Smoker   • Smokeless tobacco: Never Used   Substance and Sexual Activity   • Alcohol use: Yes     Alcohol/week: 0.0 oz     Frequency: 2-4 times a month     Drinks per session: 1 or 2     Binge frequency: Never     Comment: 2/month   • Drug use: No   • Sexual activity: Yes     Partners: Male     Birth control/protection: Condom   Lifestyle   • Physical activity:     Days per week: Not on file     Minutes per session: Not on file   • Stress: Not on file   Relationships   • Social connections:     Talks on phone: Not on file     Gets together: Not on file     Attends [employer] service: Not on file     Active member of club or organization: Not on file     Attends meetings of clubs or organizations: Not on file     Relationship status: Not on file   • Intimate partner violence:     Fear of current or ex partner: Not on file     Emotionally abused: Not on file     Physically abused: Not on file     Forced sexual activity: Not on file   Other Topics Concern   • Not on file   Social History Narrative    Has 3 sisters. 1 brother.              Family History   Problem Relation Age of Onset   • Hyperlipidemia Mother    • Hyperlipidemia Father    • Cancer Brother 14        adenocarcinoma- lung,  "carcinoid- appendix   • Genitourinary () Problems Other         cousin   • Diabetes Maternal Grandmother    • Breast Cancer Maternal Grandmother         postmenopausal       Allergies: Gluten meal    Patient is a 24-year-old female who presents today with complaint of ongoing sinus drainage, she states this is green in color.  She does not endorse any sinus pain or pressure at this time.  She has been seen twice previously for sinus infections and most recently received a prescription for Augmentin which she did finish.  Patient states symptoms improved briefly but over the last 2 days she has noted colored nasal drainage again.  Also endorses tightness in her chest with cough.            Cough   This is a new problem. The current episode started in the past 7 days. The problem has been unchanged. The problem occurs every few minutes. The cough is productive of sputum. Associated symptoms include chills, ear pain, headaches, rhinorrhea, a sore throat, shortness of breath and wheezing. Associated symptoms comments: Sinus pressure. Nothing aggravates the symptoms. Risk factors for lung disease include animal exposure.       Review of Systems   Constitutional: Positive for chills and malaise/fatigue.   HENT: Positive for congestion, ear pain, rhinorrhea, sinus pain and sore throat.    Respiratory: Positive for cough, sputum production, shortness of breath and wheezing.    Skin: Negative.    Neurological: Positive for headaches.          Objective:     /62 (BP Location: Left arm, Patient Position: Sitting, BP Cuff Size: Adult)   Pulse 88   Temp 37.2 °C (98.9 °F) (Temporal)   Resp 12   Ht 1.702 m (5' 7\")   Wt 68 kg (150 lb)   LMP 12/27/2019 (Exact Date)   SpO2 99%   Breastfeeding? No   BMI 23.49 kg/m²      Physical Exam  Vitals signs reviewed.   Constitutional:       Appearance: Normal appearance.   HENT:      Head: Normocephalic and atraumatic.      Right Ear: Tympanic membrane, ear canal and external " ear normal.      Left Ear: Tympanic membrane, ear canal and external ear normal.      Nose: No congestion or rhinorrhea.      Comments: No postnasal drainage noted at this time.  No tenderness over the frontal or maxillary sinuses.     Mouth/Throat:      Mouth: Mucous membranes are moist.   Eyes:      Extraocular Movements: Extraocular movements intact.      Conjunctiva/sclera: Conjunctivae normal.      Pupils: Pupils are equal, round, and reactive to light.   Neck:      Musculoskeletal: Normal range of motion.   Cardiovascular:      Rate and Rhythm: Normal rate and regular rhythm.      Heart sounds: Normal heart sounds.   Pulmonary:      Effort: Pulmonary effort is normal.      Comments: Breath sounds harsh  Musculoskeletal: Normal range of motion.   Skin:     General: Skin is warm and dry.   Neurological:      Mental Status: She is alert and oriented to person, place, and time.   Psychiatric:         Mood and Affect: Mood normal.         Behavior: Behavior normal.         Thought Content: Thought content normal.         Judgment: Judgment normal.       At this time, I do not see clear evidence of another bacterial infection and I do not think more antibiotics are indicated at this time.  I discussed this thoroughly with the patient, advising her that if she is continuing to have green nasal drainage and suspicion of unresolving sinus infection after Augmentin and I would recommend referral to ENT for further consult.  Patient verbalized understanding and agreement and is willing to do this.  At this time, I believe she may have a mild viral bronchitis and I will prescribe an albuterol inhaler for help with this.  I have advised patient to call or return otherwise for any further questions or concerns.          Assessment/Plan:     1. Cough  2. Acute URI  3. Sore throat  4.  Bronchitis    Albuterol  See note above  Referral given to ENT for consult  Follow-up in urgent care otherwise for any further questions or  concerns

## 2020-01-26 ENCOUNTER — APPOINTMENT (OUTPATIENT)
Dept: RADIOLOGY | Facility: IMAGING CENTER | Age: 25
End: 2020-01-26
Attending: FAMILY MEDICINE
Payer: OTHER GOVERNMENT

## 2020-01-26 ENCOUNTER — TELEPHONE (OUTPATIENT)
Dept: URGENT CARE | Facility: CLINIC | Age: 25
End: 2020-01-26

## 2020-01-26 ENCOUNTER — OFFICE VISIT (OUTPATIENT)
Dept: URGENT CARE | Facility: CLINIC | Age: 25
End: 2020-01-26
Payer: OTHER GOVERNMENT

## 2020-01-26 VITALS
BODY MASS INDEX: 25.13 KG/M2 | TEMPERATURE: 98.1 F | OXYGEN SATURATION: 96 % | RESPIRATION RATE: 12 BRPM | SYSTOLIC BLOOD PRESSURE: 114 MMHG | HEART RATE: 86 BPM | WEIGHT: 160.1 LBS | DIASTOLIC BLOOD PRESSURE: 74 MMHG | HEIGHT: 67 IN

## 2020-01-26 DIAGNOSIS — J20.9 ACUTE BRONCHITIS, UNSPECIFIED ORGANISM: ICD-10-CM

## 2020-01-26 DIAGNOSIS — R05.9 COUGH: ICD-10-CM

## 2020-01-26 DIAGNOSIS — B96.89 ACUTE BACTERIAL SINUSITIS: ICD-10-CM

## 2020-01-26 DIAGNOSIS — J01.90 ACUTE BACTERIAL SINUSITIS: ICD-10-CM

## 2020-01-26 DIAGNOSIS — J40 BRONCHITIS: ICD-10-CM

## 2020-01-26 DIAGNOSIS — J22 ACUTE LOWER RESPIRATORY INFECTION: ICD-10-CM

## 2020-01-26 PROCEDURE — 99214 OFFICE O/P EST MOD 30 MIN: CPT | Performed by: FAMILY MEDICINE

## 2020-01-26 PROCEDURE — 71046 X-RAY EXAM CHEST 2 VIEWS: CPT | Mod: TC | Performed by: FAMILY MEDICINE

## 2020-01-26 RX ORDER — PREDNISONE 20 MG/1
TABLET ORAL
Qty: 5 TAB | Refills: 0 | Status: SHIPPED
Start: 2020-01-26 | End: 2020-02-21

## 2020-01-26 RX ORDER — DOXYCYCLINE HYCLATE 100 MG
100 TABLET ORAL 2 TIMES DAILY
Qty: 20 TAB | Refills: 0 | Status: SHIPPED | OUTPATIENT
Start: 2020-01-26 | End: 2020-02-05

## 2020-01-26 RX ORDER — ALBUTEROL SULFATE 90 UG/1
2 AEROSOL, METERED RESPIRATORY (INHALATION) EVERY 6 HOURS PRN
Qty: 8.5 G | Refills: 1 | Status: SHIPPED
Start: 2020-01-26 | End: 2020-02-21

## 2020-01-26 NOTE — TELEPHONE ENCOUNTER
Pt call regarding Rx Inhaler, please could you send a new Rx for Pro-Air HFA this is the only one is cover for . Thanks

## 2020-01-26 NOTE — PROGRESS NOTES
Chief Complaint:    Chief Complaint   Patient presents with   • Shortness of Breath       History of Present Illness:    She feels hot, feels congestion in nose and ears, has purulent mucus from nose, coughs up purulent mucus, and chest hurts when she tries to take deep breath. She reports she has never fully gotten better from OV 12/19/19 and has subsequently been seen 1/9/2020 and 1/24/2020. She feels Augmentin prescribed 1/9/2020 helped some but not completely. She was rx'd MDI 1/24/2020 but has yet to get. Doxycycline worked/tolerated for similar previous problem 4/21/19.      Review of Systems:    Constitutional: See HPI.  Eyes: Negative for change in vision, photophobia, pain, redness, and discharge.  ENT: See HPI.   Respiratory: See HPI.    Cardiovascular: Negative for palpitations, orthopnea, claudication, leg swelling, and PND.   Gastrointestinal: Negative for abdominal pain, nausea, vomiting, diarrhea, constipation, blood in stool, and melena.   Genitourinary: Negative for dysuria, urinary urgency, urinary frequency, hematuria, and flank pain.   Musculoskeletal: Negative for myalgias, joint pain, neck pain, and back pain.   Skin: Negative for rash and itching.   Neurological: Negative for dizziness, tingling, tremors, sensory change, speech change, focal weakness, seizures, loss of consciousness, and headaches.   Endo: Negative for polydipsia.   Heme: Does not bruise/bleed easily.   Psychiatric/Behavioral: Negative for depression, suicidal ideas, hallucinations, memory loss and substance abuse. The patient is not nervous/anxious and does not have insomnia.        Past Medical History:    Past Medical History:   Diagnosis Date   • NEGATIVE HISTORY OF      Past Surgical History:    Past Surgical History:   Procedure Laterality Date   • KNEE ARTHROSCOPY Right 8/22/2019    Procedure: ARTHROSCOPY, KNEE;  Surgeon: Vinay Moore M.D.;  Location: SURGERY [facility];  Service: Orthopedics   • [facility]  MENISCECTOMY Right 8/22/2019    Procedure: MENISCECTOMY, KNEE, MEDIAL - PARTIAL VERSUS;  Surgeon: Vinay Moore M.D.;  Location: SURGERY [facility];  Service: Orthopedics   • MENISCAL REPAIR Right 8/22/2019    Procedure: REPAIR, MENISCUS, KNEE - PROCEED AS INDICATED;  Surgeon: Vinay Moore M.D.;  Location: SURGERY [facility];  Service: Orthopedics   • RHINOPLASTY  03/2019   • KNEE ARTHROSCOPY Left 05/2018     Social History:    Social History     Socioeconomic History   • Marital status: Single     Spouse name: Not on file   • Number of children: Not on file   • Years of education: Not on file   • Highest education level: Not on file   Occupational History   • Occupation: [employer]- 10th grade     Employer: [employer]   Social Needs   • Financial resource strain: Not on file   • Food insecurity:     Worry: Not on file     Inability: Not on file   • Transportation needs:     Medical: Not on file     Non-medical: Not on file   Tobacco Use   • Smoking status: Never Smoker   • Smokeless tobacco: Never Used   Substance and Sexual Activity   • Alcohol use: Yes     Alcohol/week: 0.0 oz     Frequency: 2-4 times a month     Drinks per session: 1 or 2     Binge frequency: Never     Comment: 2/month   • Drug use: No   • Sexual activity: Yes     Partners: Male     Birth control/protection: Condom   Lifestyle   • Physical activity:     Days per week: Not on file     Minutes per session: Not on file   • Stress: Not on file   Relationships   • Social connections:     Talks on phone: Not on file     Gets together: Not on file     Attends Religion service: Not on file     Active member of club or organization: Not on file     Attends meetings of clubs or organizations: Not on file     Relationship status: Not on file   • Intimate partner violence:     Fear of current or ex partner: Not on file     Emotionally abused: Not on file     Physically abused: Not on file     Forced sexual activity: Not on file  "  Other Topics Concern   • Not on file   Social History Narrative    Has 3 sisters. 1 brother.              Family History:    Family History   Problem Relation Age of Onset   • Hyperlipidemia Mother    • Hyperlipidemia Father    • Cancer Brother 14        adenocarcinoma- lung, carcinoid- appendix   • Genitourinary () Problems Other         cousin   • Diabetes Maternal Grandmother    • Breast Cancer Maternal Grandmother         postmenopausal     Medications:    Current Outpatient Medications on File Prior to Visit   Medication Sig Dispense Refill   • albuterol 108 (90 Base) MCG/ACT Aero Soln inhalation aerosol Inhale 2 Puffs by mouth every 6 hours as needed. 8.5 g 0   • fluticasone (FLONASE) 50 MCG/ACT nasal spray Spray 2 Sprays in nose every day. 1 Bottle 0   • ondansetron (ZOFRAN) 4 MG Tab tablet TAKE 1 TABLET BY MOUTH EVERY 6 TO 8 HOURS AS NEEDED FOR NAUSEA  0   • therapeutic multivitamin-minerals (THERAGRAN-M) Tab Take 1 Tab by mouth every day.     • B Complex Vitamins (VITAMIN B COMPLEX PO) Take  by mouth.     • Cyanocobalamin (B-12 PO) Take 500 mg by mouth.     • Cholecalciferol (D3 ADULT PO) Take 2,000 mg by mouth.       No current facility-administered medications on file prior to visit.      Allergies:    Allergies   Allergen Reactions   • Gluten Meal        Vitals:    Vitals:    01/26/20 1137   BP: 114/74   BP Location: Right arm   Patient Position: Sitting   BP Cuff Size: Adult   Pulse: 86   Resp: 12   Temp: 36.7 °C (98.1 °F)   TempSrc: Temporal   SpO2: 96%   Weight: 72.6 kg (160 lb 1.6 oz)   Height: 1.702 m (5' 7\")       Physical Exam:    Constitutional: Vital signs reviewed. Appears well-developed and well-nourished. Occl cough. No acute distress.   Eyes: Sclera white, conjunctivae clear.   ENT: External ears normal. External auditory canals normal without discharge. TMs translucent and non-bulging. Hearing normal. Nasal mucosa erythematous. Lips/teeth are normal. Oral mucosa pink and moist. " Posterior pharynx: WNL.  Neck: Neck supple.   Cardiovascular: Regular rate and rhythm. No murmur.  Pulmonary/Chest: Respirations non-labored. Clear to auscultation bilaterally.  Lymph: Cervical nodes without tenderness or enlargement.  Musculoskeletal: Normal gait. Normal range of motion. No muscular atrophy or weakness.  Neurological: Alert and oriented to person, place, and time. Muscle tone normal. Coordination normal.   Skin: No rashes or lesions. Warm, dry, normal turgor.  Psychiatric: Normal mood and affect. Behavior is normal. Judgment and thought content normal.       Diagnostics:    DX-CHEST-2 VIEWS   Order: [id number]   Status:  Final result   Visible to patient:  No (Not Released) Next appt:  None Dx:  Cough   Details     Reading Physician Reading Date Result Priority   Henrry Seo M.D. 1/26/2020 Urgent Care      Narrative & Impression        1/26/2020 11:55 AM     HISTORY/REASON FOR EXAM:  Cough; Room 2.        TECHNIQUE/EXAM DESCRIPTION AND NUMBER OF VIEWS:  Two views of the chest.     COMPARISON:  6/8/2014     FINDINGS:     Cardiomediastinal silhouette is normal.     No focal consolidation, pleural effusion, pulmonary edema or pneumothorax.     No acute osseous abnormality.     IMPRESSION:     No acute cardiopulmonary abnormality.           I personally reviewed the images. Rad report reviewed with her and copy of report to her.      Assessment / Plan:    1. Cough  - DX-CHEST-2 VIEWS; Future    2. Acute bacterial sinusitis  - doxycycline (VIBRAMYCIN) 100 MG Tab; Take 1 Tab by mouth 2 times a day for 10 days.  Dispense: 20 Tab; Refill: 0    3. Acute lower respiratory infection  - doxycycline (VIBRAMYCIN) 100 MG Tab; Take 1 Tab by mouth 2 times a day for 10 days.  Dispense: 20 Tab; Refill: 0    4. Acute bronchitis, unspecified organism  - predniSONE (DELTASONE) 20 MG Tab; 1 TAB BY MOUTH ONCE A DAY X 5 DAYS. TAKE WITH FOOD.  Dispense: 5 Tab; Refill: 0      Discussed with her DDX, management options, and  risks, benefits, and alternatives to treatment plan agreed upon.    She will pick-up MDI rx'd on 1/24/2020 and use prn.    Agreeable to medications prescribed.    Discussed expected course of duration, time for improvement, and to seek follow-up in Emergency Room, urgent care, or with PCP if getting worse at any time or not improving within expected time frame.

## 2020-02-21 ENCOUNTER — OFFICE VISIT (OUTPATIENT)
Dept: MEDICAL GROUP | Facility: IMAGING CENTER | Age: 25
End: 2020-02-21
Payer: OTHER GOVERNMENT

## 2020-02-21 ENCOUNTER — HOSPITAL ENCOUNTER (OUTPATIENT)
Dept: LAB | Facility: MEDICAL CENTER | Age: 25
End: 2020-02-21
Attending: FAMILY MEDICINE
Payer: OTHER GOVERNMENT

## 2020-02-21 VITALS
TEMPERATURE: 97.5 F | DIASTOLIC BLOOD PRESSURE: 60 MMHG | SYSTOLIC BLOOD PRESSURE: 96 MMHG | OXYGEN SATURATION: 99 % | BODY MASS INDEX: 25.06 KG/M2 | RESPIRATION RATE: 12 BRPM | WEIGHT: 160 LBS | HEART RATE: 72 BPM

## 2020-02-21 DIAGNOSIS — R55 NEAR SYNCOPE: ICD-10-CM

## 2020-02-21 DIAGNOSIS — I95.9 HYPOTENSION, UNSPECIFIED HYPOTENSION TYPE: ICD-10-CM

## 2020-02-21 DIAGNOSIS — R30.0 DYSURIA: ICD-10-CM

## 2020-02-21 LAB
ALBUMIN SERPL BCP-MCNC: 4.5 G/DL (ref 3.2–4.9)
ALBUMIN/GLOB SERPL: 1.6 G/DL
ALP SERPL-CCNC: 47 U/L (ref 30–99)
ALT SERPL-CCNC: 17 U/L (ref 2–50)
ANION GAP SERPL CALC-SCNC: 9 MMOL/L (ref 0–11.9)
APPEARANCE UR: CLEAR
AST SERPL-CCNC: 22 U/L (ref 12–45)
BASOPHILS # BLD AUTO: 0.5 % (ref 0–1.8)
BASOPHILS # BLD: 0.04 K/UL (ref 0–0.12)
BILIRUB SERPL-MCNC: 0.4 MG/DL (ref 0.1–1.5)
BILIRUB UR STRIP-MCNC: NORMAL MG/DL
BUN SERPL-MCNC: 19 MG/DL (ref 8–22)
CALCIUM SERPL-MCNC: 9.7 MG/DL (ref 8.5–10.5)
CHLORIDE SERPL-SCNC: 106 MMOL/L (ref 96–112)
CO2 SERPL-SCNC: 27 MMOL/L (ref 20–33)
COLOR UR AUTO: YELLOW
CREAT SERPL-MCNC: 0.95 MG/DL (ref 0.5–1.4)
EOSINOPHIL # BLD AUTO: 0.07 K/UL (ref 0–0.51)
EOSINOPHIL NFR BLD: 0.8 % (ref 0–6.9)
ERYTHROCYTE [DISTWIDTH] IN BLOOD BY AUTOMATED COUNT: 41.3 FL (ref 35.9–50)
GLOBULIN SER CALC-MCNC: 2.9 G/DL (ref 1.9–3.5)
GLUCOSE SERPL-MCNC: 97 MG/DL (ref 65–99)
GLUCOSE UR STRIP.AUTO-MCNC: NORMAL MG/DL
HCT VFR BLD AUTO: 43 % (ref 37–47)
HGB BLD-MCNC: 14.3 G/DL (ref 12–16)
IMM GRANULOCYTES # BLD AUTO: 0.03 K/UL (ref 0–0.11)
IMM GRANULOCYTES NFR BLD AUTO: 0.3 % (ref 0–0.9)
KETONES UR STRIP.AUTO-MCNC: NORMAL MG/DL
LEUKOCYTE ESTERASE UR QL STRIP.AUTO: NORMAL
LYMPHOCYTES # BLD AUTO: 3.33 K/UL (ref 1–4.8)
LYMPHOCYTES NFR BLD: 38.1 % (ref 22–41)
MCH RBC QN AUTO: 30.2 PG (ref 27–33)
MCHC RBC AUTO-ENTMCNC: 33.3 G/DL (ref 33.6–35)
MCV RBC AUTO: 90.7 FL (ref 81.4–97.8)
MONOCYTES # BLD AUTO: 0.52 K/UL (ref 0–0.85)
MONOCYTES NFR BLD AUTO: 6 % (ref 0–13.4)
NEUTROPHILS # BLD AUTO: 4.74 K/UL (ref 2–7.15)
NEUTROPHILS NFR BLD: 54.3 % (ref 44–72)
NITRITE UR QL STRIP.AUTO: NORMAL
NRBC # BLD AUTO: 0 K/UL
NRBC BLD-RTO: 0 /100 WBC
PH UR STRIP.AUTO: 6 [PH] (ref 5–8)
PLATELET # BLD AUTO: 251 K/UL (ref 164–446)
PMV BLD AUTO: 12.5 FL (ref 9–12.9)
POTASSIUM SERPL-SCNC: 4.3 MMOL/L (ref 3.6–5.5)
PROT SERPL-MCNC: 7.4 G/DL (ref 6–8.2)
PROT UR QL STRIP: NORMAL MG/DL
RBC # BLD AUTO: 4.74 M/UL (ref 4.2–5.4)
RBC UR QL AUTO: NORMAL
SODIUM SERPL-SCNC: 142 MMOL/L (ref 135–145)
SP GR UR STRIP.AUTO: 1.02
UROBILINOGEN UR STRIP-MCNC: 0.2 MG/DL
WBC # BLD AUTO: 8.7 K/UL (ref 4.8–10.8)

## 2020-02-21 PROCEDURE — 84443 ASSAY THYROID STIM HORMONE: CPT

## 2020-02-21 PROCEDURE — 80053 COMPREHEN METABOLIC PANEL: CPT

## 2020-02-21 PROCEDURE — 99214 OFFICE O/P EST MOD 30 MIN: CPT | Performed by: FAMILY MEDICINE

## 2020-02-21 PROCEDURE — 36415 COLL VENOUS BLD VENIPUNCTURE: CPT

## 2020-02-21 PROCEDURE — 85025 COMPLETE CBC W/AUTO DIFF WBC: CPT

## 2020-02-21 PROCEDURE — 81002 URINALYSIS NONAUTO W/O SCOPE: CPT | Performed by: FAMILY MEDICINE

## 2020-02-21 ASSESSMENT — PAIN SCALES - GENERAL: PAINLEVEL: NO PAIN

## 2020-02-21 NOTE — PATIENT INSTRUCTIONS
Obtain labs first then increase her water intake to 3 and if you need for of your flask's.  Consider adding electrolytes daily to every other day depending on how you feel

## 2020-02-21 NOTE — PROGRESS NOTES
Subjective:     CC:    Chief Complaint   Patient presents with   • Dizziness   • Dysuria       HISTORY OF THE PRESENT ILLNESS: This pleasant 24 y.o. female is here to establish care and discuss dizziness.  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  She reports that her blood pressure is usually in the 1 teens over 70s.  She also has been having lightheadedness for the past month.  She did have a bronchitis at the end of January 2020.  Though currently only with congestion no fevers chills cough.  As far as her dizzy spells go she reports that she has a lightheaded sensation, nausea, worse when going from sitting to standing.  Her periods are regular about every 29 days last 4 days and are not heavy.  She drinks 2 cups of coffee every day no other caffeine drinks.  She does not drink alcohol.  There is no possibility of her being pregnant.  She reports that she works out for 1 hour every day lifting weights.  She drinks 80 ounces of water daily.    She did notice some burning this morning, and felt like she could not completely empty her bladder.  She denies taking any medications or supplements.      Allergies: Gluten meal    No current Epic-ordered outpatient medications on file.     No current [facility]-ordered facility-administered medications on file.        Past Medical History:   Diagnosis Date   • NEGATIVE HISTORY OF        Past Surgical History:   Procedure Laterality Date   • KNEE ARTHROSCOPY Right 8/22/2019    Procedure: ARTHROSCOPY, KNEE;  Surgeon: Vinay Moore M.D.;  Location: [facility];  Service: Orthopedics   • MEDIAL MENISCECTOMY Right 8/22/2019    Procedure: MENISCECTOMY, KNEE, MEDIAL - PARTIAL VERSUS;  Surgeon: Vinay Moore M.D.;  Location: [facility];  Service: Orthopedics   • MENISCAL REPAIR Right  8/22/2019    Procedure: REPAIR, MENISCUS, KNEE - PROCEED AS INDICATED;  Surgeon: Vinay Moore M.D.;  Location: SURGERY [facility];  Service: Orthopedics   • RHINOPLASTY  03/2019   • KNEE ARTHROSCOPY Left 05/2018       Social History     Tobacco Use   • Smoking status: Never Smoker   • Smokeless tobacco: Never Used   Substance Use Topics   • Alcohol use: Yes     Alcohol/week: 0.0 oz     Frequency: 2-4 times a month     Drinks per session: 1 or 2     Binge frequency: Never     Comment: 2/month   • Drug use: No       Social History     Social History Narrative    Has 3 sisters. 1 brother.                Family History   Problem Relation Age of Onset   • Hyperlipidemia Mother    • Hyperlipidemia Father    • Cancer Brother 14        adenocarcinoma- lung, carcinoid- appendix   • Genitourinary () Problems Other         cousin   • Diabetes Maternal Grandmother    • Breast Cancer Maternal Grandmother         postmenopausal       ROS:   Gen: no fevers/chills, no changes in weight  Eyes: no changes in vision  ENT: no sore throat, no hearing loss  Pulm: no sob, no cough  CV: no chest pain, no palpitations  GI: no nausea/vomiting, no diarrhea  : w dysuria  MSk: no myalgias  Skin: no rash  Neuro: no headaches, no numbness/tingling  Heme/Lymph: no easy bruising      Objective:     Exam: BP (!) 96/60   Pulse 72   Temp 36.4 °C (97.5 °F)   Resp 12   Wt 72.6 kg (160 lb)   SpO2 99%  Body mass index is 25.06 kg/m².    General: Normal appearing. No distress.  HEENT: Normocephalic. Eyes conjunctiva clear lids without ptosis, eomi. [place] is negative for nystagmus some dizziness when going from laying down this sitting up though not vertiginous  Neck: Thyroid is not enlarged.  Pulmonary: Clear to ausculation.  Normal effort. No rales, ronchi, or wheezing.  Cardiovascular: Regular rate and rhythm without murmur. Carotid and radial pulses are intact and equal bilaterally.  Abdomen: Nontender nondistended bowel  sounds present no hepatosplenomegaly  Neurologic: No facial droop, normal gait, alert and oriented  Skin: Warm and dry.  No obvious lesions.  Musculoskeletal: No extremity cyanosis, clubbing, or edema.      Assessment & Plan:   24 y.o. female with the following -    Problem List Items Addressed This Visit     Hypotension    Relevant Orders    Comp Metabolic Panel    CBC WITH DIFFERENTIAL    TSH WITH REFLEX TO FT4    VITAMIN D,25 HYDROXY    ESTIMATED GFR    Dysuria      Other Visit Diagnoses     Near syncope        Relevant Orders    VITAMIN D,25 HYDROXY        Problem   Hypotension    -I suspect secondary to hypovolemia  -We will run some serum studies  -I have recommended that she increase from 2 of the 40 ounce flask's to 3 or 4 especially given that she works out for 1 hour a day 7 days a week  -I also recommend considering electrolyte tabs either daily or every other day pending how she feels  -I do not suspect inner ear issues from her recent viral illness  -rto if symptoms do not resolve     Dysuria    Urine dip negative today in office  Please rto if you symptoms continue       Return if symptoms worsen or fail to improve.    Please note that this dictation was created using voice recognition software. I have made every reasonable attempt to correct obvious errors, but I expect that there are errors of grammar and possibly content that I did not discover before finalizing the note.

## 2020-02-22 LAB — TSH SERPL DL<=0.005 MIU/L-ACNC: 2.22 UIU/ML (ref 0.38–5.33)

## 2020-07-17 ENCOUNTER — TELEPHONE (OUTPATIENT)
Dept: MEDICAL GROUP | Facility: IMAGING CENTER | Age: 25
End: 2020-07-17

## 2020-07-17 DIAGNOSIS — Z11.1 SCREENING-PULMONARY TB: ICD-10-CM

## 2020-07-17 NOTE — TELEPHONE ENCOUNTER
Pt calling to request tb test to have drawn at "[facility] on [address]. She needs the test for school.     Ok to respond via [facility] if Dr. Mcneil is willing to order. Pt also requested scheduling for follow up. appt scheduled.

## 2020-07-23 ENCOUNTER — NON-PROVIDER VISIT (OUTPATIENT)
Dept: MEDICAL GROUP | Facility: IMAGING CENTER | Age: 25
End: 2020-07-23
Payer: OTHER GOVERNMENT

## 2020-07-23 DIAGNOSIS — Z23 NEED FOR VARICELLA VACCINE: ICD-10-CM

## 2020-07-23 PROCEDURE — 90471 IMMUNIZATION ADMIN: CPT | Performed by: FAMILY MEDICINE

## 2020-07-23 PROCEDURE — 90716 VAR VACCINE LIVE SUBQ: CPT | Performed by: FAMILY MEDICINE

## 2020-07-23 NOTE — PROGRESS NOTES
"Michel Angelo is a 24 y.o. female here for a non-provider visit for:   VARICELLA (Chicken Pox) 2 of 2    Reason for immunization: needed for work/school  Immunization records indicate need for vaccine: Yes, confirmed with Epic  Minimum interval has been met for this vaccine: Yes  ABN completed: Not Indicated    Order and dose verified by: CAMPBELL Don  VIS Dated  08/15/19 was given to patient: Yes  All IAC Questionnaire questions were answered \"No.\"    Patient tolerated injection and no adverse effects were observed or reported: Yes    Pt scheduled for next dose in series: Not Indicated    "

## 2020-07-26 LAB
GAMMA INTERFERON BACKGROUND BLD IA-ACNC: 0.03 IU/ML
M TB IFN-G BLD-IMP: NEGATIVE
M TB IFN-G CD4+ BCKGRND COR BLD-ACNC: 0.05 IU/ML
M TB IFN-G CD4+CD8+ BCKGRND COR BLD-ACNC: 0.04 IU/ML
MITOGEN IGNF BLD-ACNC: >10 IU/ML
QUANTIFERON INCUBATION 182889: NORMAL
SERVICE CMNT-IMP: NORMAL

## 2020-08-14 ENCOUNTER — APPOINTMENT (OUTPATIENT)
Dept: MEDICAL GROUP | Facility: IMAGING CENTER | Age: 25
End: 2020-08-14
Payer: OTHER GOVERNMENT

## 2020-09-04 ENCOUNTER — HOSPITAL ENCOUNTER (EMERGENCY)
Facility: MEDICAL CENTER | Age: 25
End: 2020-09-04
Attending: EMERGENCY MEDICINE
Payer: OTHER GOVERNMENT

## 2020-09-04 ENCOUNTER — APPOINTMENT (OUTPATIENT)
Dept: RADIOLOGY | Facility: MEDICAL CENTER | Age: 25
End: 2020-09-04
Attending: EMERGENCY MEDICINE
Payer: OTHER GOVERNMENT

## 2020-09-04 VITALS
OXYGEN SATURATION: 99 % | HEART RATE: 74 BPM | HEIGHT: 67 IN | DIASTOLIC BLOOD PRESSURE: 74 MMHG | TEMPERATURE: 98.7 F | RESPIRATION RATE: 18 BRPM | BODY MASS INDEX: 25.26 KG/M2 | SYSTOLIC BLOOD PRESSURE: 114 MMHG | WEIGHT: 160.94 LBS

## 2020-09-04 DIAGNOSIS — K59.09 CHRONIC CONSTIPATION: ICD-10-CM

## 2020-09-04 DIAGNOSIS — R11.0 NAUSEA: ICD-10-CM

## 2020-09-04 DIAGNOSIS — R10.9 ACUTE ABDOMINAL PAIN: ICD-10-CM

## 2020-09-04 LAB
ALBUMIN SERPL BCP-MCNC: 5.2 G/DL (ref 3.2–4.9)
ALBUMIN/GLOB SERPL: 1.6 G/DL
ALP SERPL-CCNC: 62 U/L (ref 30–99)
ALT SERPL-CCNC: 20 U/L (ref 2–50)
ANION GAP SERPL CALC-SCNC: 13 MMOL/L (ref 7–16)
APPEARANCE UR: CLEAR
AST SERPL-CCNC: 23 U/L (ref 12–45)
BASOPHILS # BLD AUTO: 0.5 % (ref 0–1.8)
BASOPHILS # BLD: 0.05 K/UL (ref 0–0.12)
BILIRUB SERPL-MCNC: 0.4 MG/DL (ref 0.1–1.5)
BILIRUB UR QL STRIP.AUTO: NEGATIVE
BUN SERPL-MCNC: 13 MG/DL (ref 8–22)
CALCIUM SERPL-MCNC: 10.1 MG/DL (ref 8.4–10.2)
CHLORIDE SERPL-SCNC: 101 MMOL/L (ref 96–112)
CO2 SERPL-SCNC: 26 MMOL/L (ref 20–33)
COLOR UR: YELLOW
COVID ORDER STATUS COVID19: NORMAL
CREAT SERPL-MCNC: 0.96 MG/DL (ref 0.5–1.4)
EOSINOPHIL # BLD AUTO: 0.05 K/UL (ref 0–0.51)
EOSINOPHIL NFR BLD: 0.5 % (ref 0–6.9)
ERYTHROCYTE [DISTWIDTH] IN BLOOD BY AUTOMATED COUNT: 39.8 FL (ref 35.9–50)
GLOBULIN SER CALC-MCNC: 3.2 G/DL (ref 1.9–3.5)
GLUCOSE SERPL-MCNC: 93 MG/DL (ref 65–99)
GLUCOSE UR STRIP.AUTO-MCNC: NEGATIVE MG/DL
HCG SERPL QL: NEGATIVE
HCT VFR BLD AUTO: 45.2 % (ref 37–47)
HGB BLD-MCNC: 15.1 G/DL (ref 12–16)
IMM GRANULOCYTES # BLD AUTO: 0.06 K/UL (ref 0–0.11)
IMM GRANULOCYTES NFR BLD AUTO: 0.6 % (ref 0–0.9)
KETONES UR STRIP.AUTO-MCNC: NEGATIVE MG/DL
LEUKOCYTE ESTERASE UR QL STRIP.AUTO: NEGATIVE
LIPASE SERPL-CCNC: 24 U/L (ref 7–58)
LYMPHOCYTES # BLD AUTO: 3.84 K/UL (ref 1–4.8)
LYMPHOCYTES NFR BLD: 37.7 % (ref 22–41)
MCH RBC QN AUTO: 29 PG (ref 27–33)
MCHC RBC AUTO-ENTMCNC: 33.4 G/DL (ref 33.6–35)
MCV RBC AUTO: 86.9 FL (ref 81.4–97.8)
MICRO URNS: NORMAL
MONOCYTES # BLD AUTO: 0.56 K/UL (ref 0–0.85)
MONOCYTES NFR BLD AUTO: 5.5 % (ref 0–13.4)
NEUTROPHILS # BLD AUTO: 5.63 K/UL (ref 2–7.15)
NEUTROPHILS NFR BLD: 55.2 % (ref 44–72)
NITRITE UR QL STRIP.AUTO: NEGATIVE
NRBC # BLD AUTO: 0 K/UL
NRBC BLD-RTO: 0 /100 WBC
PH UR STRIP.AUTO: 6.5 [PH] (ref 5–8)
PLATELET # BLD AUTO: 295 K/UL (ref 164–446)
PMV BLD AUTO: 11.8 FL (ref 9–12.9)
POTASSIUM SERPL-SCNC: 4.2 MMOL/L (ref 3.6–5.5)
PROT SERPL-MCNC: 8.4 G/DL (ref 6–8.2)
PROT UR QL STRIP: NEGATIVE MG/DL
RBC # BLD AUTO: 5.2 M/UL (ref 4.2–5.4)
RBC UR QL AUTO: NEGATIVE
SARS-COV-2 RNA RESP QL NAA+PROBE: NOTDETECTED
SODIUM SERPL-SCNC: 140 MMOL/L (ref 135–145)
SP GR UR STRIP.AUTO: 1.01
SPECIMEN SOURCE: NORMAL
WBC # BLD AUTO: 10.2 K/UL (ref 4.8–10.8)

## 2020-09-04 PROCEDURE — U0003 INFECTIOUS AGENT DETECTION BY NUCLEIC ACID (DNA OR RNA); SEVERE ACUTE RESPIRATORY SYNDROME CORONAVIRUS 2 (SARS-COV-2) (CORONAVIRUS DISEASE [COVID-19]), AMPLIFIED PROBE TECHNIQUE, MAKING USE OF HIGH THROUGHPUT TECHNOLOGIES AS DESCRIBED BY CMS-2020-01-R: HCPCS

## 2020-09-04 PROCEDURE — 700105 HCHG RX REV CODE 258: Performed by: EMERGENCY MEDICINE

## 2020-09-04 PROCEDURE — 700111 HCHG RX REV CODE 636 W/ 250 OVERRIDE (IP)

## 2020-09-04 PROCEDURE — 84703 CHORIONIC GONADOTROPIN ASSAY: CPT

## 2020-09-04 PROCEDURE — C9803 HOPD COVID-19 SPEC COLLECT: HCPCS | Performed by: EMERGENCY MEDICINE

## 2020-09-04 PROCEDURE — 74177 CT ABD & PELVIS W/CONTRAST: CPT

## 2020-09-04 PROCEDURE — 80053 COMPREHEN METABOLIC PANEL: CPT

## 2020-09-04 PROCEDURE — 85025 COMPLETE CBC W/AUTO DIFF WBC: CPT

## 2020-09-04 PROCEDURE — 99284 EMERGENCY DEPT VISIT MOD MDM: CPT

## 2020-09-04 PROCEDURE — 96374 THER/PROPH/DIAG INJ IV PUSH: CPT | Mod: XU

## 2020-09-04 PROCEDURE — 81003 URINALYSIS AUTO W/O SCOPE: CPT

## 2020-09-04 PROCEDURE — 36415 COLL VENOUS BLD VENIPUNCTURE: CPT

## 2020-09-04 PROCEDURE — 700117 HCHG RX CONTRAST REV CODE 255: Performed by: EMERGENCY MEDICINE

## 2020-09-04 PROCEDURE — 83690 ASSAY OF LIPASE: CPT

## 2020-09-04 RX ORDER — ONDANSETRON 4 MG/1
4 TABLET, ORALLY DISINTEGRATING ORAL EVERY 8 HOURS PRN
Qty: 9 TAB | Refills: 0 | Status: SHIPPED | OUTPATIENT
Start: 2020-09-04 | End: 2020-09-04 | Stop reason: SDUPTHER

## 2020-09-04 RX ORDER — HYDROMORPHONE HYDROCHLORIDE 1 MG/ML
0.5 INJECTION, SOLUTION INTRAMUSCULAR; INTRAVENOUS; SUBCUTANEOUS ONCE
Status: COMPLETED | OUTPATIENT
Start: 2020-09-04 | End: 2020-09-04

## 2020-09-04 RX ORDER — OMEPRAZOLE 40 MG/1
40 CAPSULE, DELAYED RELEASE ORAL DAILY
Qty: 14 CAP | Refills: 0 | Status: SHIPPED | OUTPATIENT
Start: 2020-09-04 | End: 2020-09-04 | Stop reason: SDUPTHER

## 2020-09-04 RX ORDER — ONDANSETRON 4 MG/1
4 TABLET, ORALLY DISINTEGRATING ORAL EVERY 8 HOURS PRN
Qty: 9 TAB | Refills: 0 | Status: SHIPPED | OUTPATIENT
Start: 2020-09-04 | End: 2020-11-15

## 2020-09-04 RX ORDER — OMEPRAZOLE 40 MG/1
40 CAPSULE, DELAYED RELEASE ORAL DAILY
Qty: 14 CAP | Refills: 0 | Status: SHIPPED | OUTPATIENT
Start: 2020-09-04 | End: 2020-09-18

## 2020-09-04 RX ORDER — SODIUM CHLORIDE 9 MG/ML
1000 INJECTION, SOLUTION INTRAVENOUS ONCE
Status: COMPLETED | OUTPATIENT
Start: 2020-09-04 | End: 2020-09-04

## 2020-09-04 RX ADMIN — SODIUM CHLORIDE 1000 ML: 9 INJECTION, SOLUTION INTRAVENOUS at 20:58

## 2020-09-04 RX ADMIN — IOHEXOL 100 ML: 350 INJECTION, SOLUTION INTRAVENOUS at 22:49

## 2020-09-04 RX ADMIN — HYDROMORPHONE HYDROCHLORIDE 0.5 MG: 1 INJECTION, SOLUTION INTRAMUSCULAR; INTRAVENOUS; SUBCUTANEOUS at 22:12

## 2020-09-04 ASSESSMENT — PAIN DESCRIPTION - DESCRIPTORS: DESCRIPTORS: ACHING

## 2020-09-04 ASSESSMENT — FIBROSIS 4 INDEX: FIB4 SCORE: 0.47

## 2020-09-05 NOTE — ED PROVIDER NOTES
ED Provider Note    CHIEF COMPLAINT  Chief Complaint   Patient presents with   • RLQ Pain   • Nausea       [facility]    Primary care provider: Gisselle Mcneil M.D.  Means of arrival: POV  History obtained from: Patient  History limited by: Nothing    Michel Angelo is a 24 y.o. female who presents with abdominal pain.  Started this morning after awakening, started as periumbilical and radiating to the right side throughout the day.  The pain comes and goes it waxes and wanes in severity she does have episodes where she is pain-free.  When she gets severe pain she has associated nausea she has no vomiting.  Decreased appetite today.  No prior episodes.  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  No actual vomiting.  No dysuria or urinary symptoms.  LMP several weeks ago she is regular with her cycles.  No oral contraception.  No prior episodes like this.  She denies chance of pregnancy she only has sex with women.  No vaginal discharge or lesions.    REVIEW OF SYSTEMS  Constitutional: Negative for fever or chills.   HENT: Negative for rhinorrhea or sore throat.    Respiratory: Negative for cough or shortness of breath.    Cardiovascular: Negative for chest pain or palpitations.   Gastrointestinal: Positive for nausea and periumbilical abdominal pain going to her right lower quadrant, negative for vomiting.  Genitourinary: Negative for dysuria or flank pain.   Musculoskeletal: Negative for back pain or joint pain.   Skin: Negative for itching or rash.   Neurological: Negative for sensory or motor changes.   See HPI for further details. All other systems are negative.     PAST MEDICAL HISTORY   has a past medical history of NEGATIVE HISTORY OF.    PAST FAMILY HISTORY  Family History   Problem Relation Age of Onset   • Hyperlipidemia Mother    • Hyperlipidemia Father    • Cancer Brother 14        adenocarcinoma- lung,  "carcinoid- appendix   • Genitourinary () Problems Other         cousin   • Diabetes Maternal Grandmother    • Breast Cancer Maternal Grandmother         postmenopausal       SOCIAL HISTORY  Social History     Tobacco Use   • Smoking status: Never Smoker   • Smokeless tobacco: Never Used   Substance and Sexual Activity   • Alcohol use: Yes     Alcohol/week: 0.0 oz     Frequency: 2-4 times a month     Drinks per session: 1 or 2     Binge frequency: Never     Comment: 2/month   • Drug use: No   • Sexual activity: Yes     Partners: Male     Birth control/protection: Condom       SURGICAL HISTORY   has a past surgical history that includes knee arthroscopy (Left, 05/2018); rhinoplasty (03/2019); knee arthroscopy (Right, 8/22/2019); medial meniscectomy (Right, 8/22/2019); and meniscal repair (Right, 8/22/2019).    CURRENT MEDICATIONS  No daily medications.    ALLERGIES  Allergies   Allergen Reactions   • Gluten Meal        PHYSICAL EXAM  VITAL SIGNS: /74   Pulse 74   Temp 37.1 °C (98.7 °F) (Temporal)   Resp 18   Ht 1.702 m (5' 7\")   Wt 73 kg (160 lb 15 oz)   LMP 08/21/2020 (Approximate)   SpO2 99%   BMI 25.21 kg/m²    Pulse ox interpretation: On room air, I interpret this pulse ox as normal.  Constitutional: Well-developed, well-nourished. Sitting up.   HEENT: Normocephalic, atraumatic. Posterior pharynx clear, mucous membranes dry.  Eyes:  EOMI. Normal sclerae.  Neck: Supple, nontender.  Chest/Pulmonary: Clear to ausculation bilaterally, no wheezes or rhonchi.  Cardiovascular: Regular rate and rhythm, no murmur.   Abdomen: Soft, left upper quadrant and right lower quadrant tenderness; no rebound, guarding, or masses.  Back: No CVA or midline tenderness.   Musculoskeletal: No deformity or edema.  Neuro: Clear speech, normal coordination, cranial nerves II-XII grossly intact, no focal asymmetry or sensory deficits.   Psych: Normal mood and affect.  Skin: No rashes, warm and dry.      DIAGNOSTIC STUDIES / " PROCEDURES    LABS & EKG  Results for orders placed or performed during the hospital encounter of 09/04/20   COMP METABOLIC PANEL   Result Value Ref Range    Sodium 140 135 - 145 mmol/L    Potassium 4.2 3.6 - 5.5 mmol/L    Chloride 101 96 - 112 mmol/L    Co2 26 20 - 33 mmol/L    Anion Gap 13.0 7.0 - 16.0    Glucose 93 65 - 99 mg/dL    Bun 13 8 - 22 mg/dL    Creatinine 0.96 0.50 - 1.40 mg/dL    Calcium 10.1 8.4 - 10.2 mg/dL    AST(SGOT) 23 12 - 45 U/L    ALT(SGPT) 20 2 - 50 U/L    Alkaline Phosphatase 62 30 - 99 U/L    Total Bilirubin 0.4 0.1 - 1.5 mg/dL    Albumin 5.2 (H) 3.2 - 4.9 g/dL    Total Protein 8.4 (H) 6.0 - 8.2 g/dL    Globulin 3.2 1.9 - 3.5 g/dL    A-G Ratio 1.6 g/dL   LIPASE   Result Value Ref Range    Lipase 24 7 - 58 U/L   URINALYSIS,CULTURE IF INDICATED    Specimen: Urine   Result Value Ref Range    Color Yellow     Character Clear     Specific Gravity 1.010 <1.035    Ph 6.5 5.0 - 8.0    Glucose Negative Negative mg/dL    Ketones Negative Negative mg/dL    Protein Negative Negative mg/dL    Bilirubin Negative Negative    Nitrite Negative Negative    Leukocyte Esterase Negative Negative    Occult Blood Negative Negative    Micro Urine Req see below    HCG QUAL SERUM   Result Value Ref Range    Beta-Hcg Qualitative Serum Negative Negative   ESTIMATED GFR   Result Value Ref Range    GFR If African American >60 >60 mL/min/1.73 m 2    GFR If Non African American >60 >60 mL/min/1.73 m 2   CBC WITH DIFFERENTIAL   Result Value Ref Range    WBC 10.2 4.8 - 10.8 K/uL    RBC 5.20 4.20 - 5.40 M/uL    Hemoglobin 15.1 12.0 - 16.0 g/dL    Hematocrit 45.2 37.0 - 47.0 %    MCV 86.9 81.4 - 97.8 fL    MCH 29.0 27.0 - 33.0 pg    MCHC 33.4 (L) 33.6 - 35.0 g/dL    RDW 39.8 35.9 - 50.0 fL    Platelet Count 295 164 - 446 K/uL    MPV 11.8 9.0 - 12.9 fL    Neutrophils-Polys 55.20 44.00 - 72.00 %    Lymphocytes 37.70 22.00 - 41.00 %    Monocytes 5.50 0.00 - 13.40 %    Eosinophils 0.50 0.00 - 6.90 %    Basophils 0.50 0.00 - 1.80  %    Immature Granulocytes 0.60 0.00 - 0.90 %    Nucleated RBC 0.00 /100 WBC    Neutrophils (Absolute) 5.63 2.00 - 7.15 K/uL    Lymphs (Absolute) 3.84 1.00 - 4.80 K/uL    Monos (Absolute) 0.56 0.00 - 0.85 K/uL    Eos (Absolute) 0.05 0.00 - 0.51 K/uL    Baso (Absolute) 0.05 0.00 - 0.12 K/uL    Immature Granulocytes (abs) 0.06 0.00 - 0.11 K/uL    NRBC (Absolute) 0.00 K/uL   COVID/SARS CoV-2 PCR    Specimen: Nasopharyngeal; Respirate   Result Value Ref Range    COVID Order Status Received    SARS-CoV-2, PCR (In-House)   Result Value Ref Range    SARS-CoV-2 Source NP Swab     SARS-CoV-2 by PCR NotDetected        RADIOLOGY  CT-ABDOMEN-PELVIS WITH   Final Result         1.  Fluid-filled prominence of small bowel suggests mild ileus or enteritis.   2.  Moderate stool in the colon suggests changes of constipation.   3.  Hepatomegaly          COURSE & MEDICAL DECISION MAKING    This is a 24 y.o. female who presents with abdominal pain and nausea and decreased appetite today.  Pain pattern concerning for acute appendicitis.    Differential Diagnosis includes but is not limited to:  Acute appendicitis, hepatobiliary disease, pancreatitis, inflammatory bowel disease, constipation, pelvic disease    ED Course:  This is a very pleasant 24-year-old female coming in with periumbilical abdominal pain going to the right side today with nausea and decreased appetite.  No fevers.  No peritoneal findings but she has fairly diffuse abdominal pain mostly in the right lower quadrant and left upper quadrant.  She denies any pelvic pain she does not have sex with men, denies vaginal discharge or urinary symptoms.  Plan to screen with labs and imaging I am most concerned about a possible acute appendicitis.  Her pain is intermittent, but never in her pelvis, clinically doubt intermittent torsion.    Thankfully medical work-up is reassuring, white count normal LFTs and lipase reassuring.  She is not pregnant doubt ectopic or IUP.  Electrolytes  are stable no acidosis.  No markers of infection on urinalysis.  She is receiving IV contrast I will treat her with a crystalloid fluid bolus and a low dose of pain medicine.  CT scan thankfully nothing acute she has no adnexal swelling highly doubt torsion, no signs of other surgical disease on advanced imaging.  She does have some prominence of her small bowel suggesting mild ileus or enteritis.  There is constipation present.  History of chronic constipation.    On recheck patient feeling much better abdomen soft she has not vomited here.  Her vital signs are stable.  She wishes to go home I think this is safe and reasonable instruction will return immediately for any new or worsening pain or fevers or vomiting or any other concerns.  Discussed if she has any sudden onset severe pain that could be ovarian torsion and she should be seen immediately.  If she has worsening right-sided abdominal pain or vomiting or any fever she will seek emergent reevaluation is early appendicitis is still a possibility.  Follow-up with primary care provider return if any worse ondansetron and omeprazole for symptom relief at home.    Medications   NS infusion 1,000 mL (0 mL Intravenous Stopped 9/4/20 9613)   HYDROmorphone pf (DILAUDID) injection 0.5 mg (0.5 mg Intravenous Given 9/4/20 2212)   iohexol (OMNIPAQUE) 350 mg/mL (100 mL Intravenous Given 9/4/20 2249)     FINAL IMPRESSION  1. Acute abdominal pain    2. Nausea    3. Chronic constipation        PRESCRIPTIONS  Discharge Medication List as of 9/4/2020 11:44 PM          FOLLOW UP  Gisselle Mcneil M.D.  [address] [place] [address] [place]AUFFER [id number] [phone]    Schedule an appointment as soon as possible for a visit in 3 days      [facility], Emergency Dept  [address]  [place] [id number] [phone]  Today  If you have ANY new or worse symptoms!      -DISCHARGE-       Results, exam findings, clinical impression, presumed diagnosis, treatment  options, and strict return precautions were discussed with the patient, and they verbalized understanding, agreed with, and appreciated the plan of care.    Pertinent Labs & Imaging studies reviewed and verified by myself, as well as nursing notes and the patient's past medical, family, and social histories (See chart for details).    The patient is referred to a primary physician for recheck of today's complaint and routine health care.    Portions of this record were made with voice recognition software.  Despite my review, spelling/grammar/context errors may still remain.  Interpretation of this chart should be taken in this context.    Electronically signed by Jose Augustin M.D. on 9/5/2020 at 12:56 AM.

## 2020-09-05 NOTE — DISCHARGE INSTRUCTIONS
You were seen and evaluated in the Emergency Department at [facility] for:     Abdominal pain and nausea and decreased appetite    You had the following tests and studies:    Thankfully, your work-up today is very reassuring.  We do not see anything dangerous like your appendix or ovaries or anything requiring surgery causing your symptoms.  You have a lot of fluid in your small intestine he may develop diarrhea this could be the developing of a stomach bug.    You received the following medications:    Pain medicine, IV fluids.    You received the following prescriptions:    Ondansetron take as needed for nausea.  Omeprazole and antacid to take first thing in the morning for 2 weeks.  ----------------------------    Please make sure to follow up with:    Your primary care provider for recheck and routine care next week, while you look safe right now you should only be feeling better in the coming days so if you have any new or worsening pain or fevers or vomiting or sudden onset pain or any other concerns need to return to the ER immediately.    Good luck, we hope to get better soon!  ----------------------------    We always encourage patients to return IMMEDIATELY if they have:  Increased or changing pain, passing out, fevers over 100.4 (taken in your mouth or rectally) for more than 2 days, redness or swelling of skin or tissues, feeling like your heart is beating fast, chest pain that is new or worsening, trouble breathing, feeling like your throat is closing up and can not breath, inability to walk, weakness of any part of your body, new dizziness, severe bleeding that won't stop from any part of your body, if you can't eat or drink, or if you have any other concerns.   If you feel worse, please know that you can always return with any questions, concerns, worse symptoms, or you are feeling unsafe. We certainly cannot say for sure that we have ruled out every illness or dangerous disease, but we  feel that at this specific time, your exam, tests, and vital signs like heart rate and blood pressure are safe for discharge.

## 2020-09-05 NOTE — ED TRIAGE NOTES
"Presents complaining of acute onset of RLQ abdominal pain with associated episodic nausea recurring since this AM.  Chief Complaint   Patient presents with   • RLQ Pain   • Nausea     /70   Pulse 80   Temp 37.1 °C (98.7 °F) (Temporal)   Resp 18   Ht 1.702 m (5' 7\")   Wt 73 kg (160 lb 15 oz)   LMP 08/21/2020 (Approximate)   SpO2 99%   BMI 25.21 kg/m²     "

## 2020-09-05 NOTE — ED NOTES
Pt given discharge instructions. RN answered questions. VSS. Pt ambulated steadily out to [facility].

## 2020-11-15 ENCOUNTER — OFFICE VISIT (OUTPATIENT)
Dept: URGENT CARE | Facility: PHYSICIAN GROUP | Age: 25
End: 2020-11-15
Payer: OTHER GOVERNMENT

## 2020-11-15 ENCOUNTER — HOSPITAL ENCOUNTER (OUTPATIENT)
Dept: RADIOLOGY | Facility: MEDICAL CENTER | Age: 25
End: 2020-11-15
Attending: NURSE PRACTITIONER
Payer: OTHER GOVERNMENT

## 2020-11-15 VITALS
HEIGHT: 67 IN | TEMPERATURE: 96.9 F | HEART RATE: 68 BPM | SYSTOLIC BLOOD PRESSURE: 118 MMHG | WEIGHT: 160 LBS | BODY MASS INDEX: 25.11 KG/M2 | DIASTOLIC BLOOD PRESSURE: 72 MMHG | OXYGEN SATURATION: 100 %

## 2020-11-15 DIAGNOSIS — M79.672 PAIN OF LEFT HEEL: ICD-10-CM

## 2020-11-15 DIAGNOSIS — M72.2 PLANTAR FASCIITIS OF LEFT FOOT: ICD-10-CM

## 2020-11-15 PROCEDURE — 99214 OFFICE O/P EST MOD 30 MIN: CPT | Performed by: NURSE PRACTITIONER

## 2020-11-15 PROCEDURE — 73650 X-RAY EXAM OF HEEL: CPT | Mod: LT

## 2020-11-15 ASSESSMENT — ENCOUNTER SYMPTOMS
FEVER: 0
TINGLING: 0
ABDOMINAL PAIN: 0
CHILLS: 0
NAUSEA: 0
CHANGE IN BOWEL HABIT: 0
WEAKNESS: 0
SENSORY CHANGE: 0
MYALGIAS: 0
SHORTNESS OF BREATH: 0
JOINT SWELLING: 0
NUMBNESS: 0
VOMITING: 0
NECK PAIN: 0
FATIGUE: 0

## 2020-11-15 ASSESSMENT — FIBROSIS 4 INDEX: FIB4 SCORE: 0.4

## 2020-11-15 NOTE — PATIENT INSTRUCTIONS
Plantar Fasciitis    Plantar fasciitis is a painful foot condition that affects the heel. It occurs when the band of tissue that connects the toes to the heel bone (plantar fascia) becomes irritated. This can happen as the result of exercising too much or doing other repetitive activities (overuse injury).  The pain from plantar fasciitis can range from mild irritation to severe pain that makes it difficult to walk or move. The pain is usually worse in the morning after sleeping, or after sitting or lying down for a while. Pain may also be worse after long periods of walking or standing.  What are the causes?  This condition may be caused by:  · Standing for long periods of time.  · Wearing shoes that do not have good arch support.  · Doing activities that put stress on joints (high-impact activities), including running, aerobics, and ballet.  · Being overweight.  · An abnormal way of walking (gait).  · Tight muscles in the back of your lower leg (calf).  · High arches in your feet.  · Starting a new athletic activity.  What are the signs or symptoms?  The main symptom of this condition is heel pain. Pain may:  · Be worse with first steps after a time of rest, especially in the morning after sleeping or after you have been sitting or lying down for a while.  · Be worse after long periods of standing still.  · Decrease after 30-45 minutes of activity, such as gentle walking.  How is this diagnosed?  This condition may be diagnosed based on your medical history and your symptoms. Your health care provider may ask questions about your activity level. Your health care provider will do a physical exam to check for:  · A tender area on the bottom of your foot.  · A high arch in your foot.  · Pain when you move your foot.  · Difficulty moving your foot.  You may have imaging tests to confirm the diagnosis, such as:  · X-rays.  · Ultrasound.  · MRI.  How is this treated?  Treatment for plantar fasciitis depends on how  severe your condition is. Treatment may include:  · Rest, ice, applying pressure (compression), and raising the affected foot (elevation). This may be called RICE therapy. Your health care provider may recommend RICE therapy along with over-the-counter pain medicines to manage your pain.  · Exercises to stretch your calves and your plantar fascia.  · A splint that holds your foot in a stretched, upward position while you sleep (night splint).  · Physical therapy to relieve symptoms and prevent problems in the future.  · Injections of steroid medicine (cortisone) to relieve pain and inflammation.  · Stimulating your plantar fascia with electrical impulses (extracorporeal shock wave therapy). This is usually the last treatment option before surgery.  · Surgery, if other treatments have not worked after 12 months.  Follow these instructions at home:    Managing pain, stiffness, and swelling  · If directed, put ice on the painful area:  ? Put ice in a plastic bag, or use a frozen bottle of water.  ? Place a towel between your skin and the bag or bottle.  ? Roll the bottom of your foot over the bag or bottle.  ? Do this for 20 minutes, 2-3 times a day.  · Wear athletic shoes that have air-sole or gel-sole cushions, or try wearing soft shoe inserts that are designed for plantar fasciitis.  · Raise (elevate) your foot above the level of your heart while you are sitting or lying down.  Activity  · Avoid activities that cause pain. Ask your health care provider what activities are safe for you.  · Do physical therapy exercises and stretches as told by your health care provider.  · Try activities and forms of exercise that are easier on your joints (low-impact). Examples include swimming, water aerobics, and biking.  General instructions  · Take over-the-counter and prescription medicines only as told by your health care provider.  · Wear a night splint while sleeping, if told by your health care provider. Loosen the splint  if your toes tingle, become numb, or turn cold and blue.  · Maintain a healthy weight, or work with your health care provider to lose weight as needed.  · Keep all follow-up visits as told by your health care provider. This is important.  Contact a health care provider if you:  · Have symptoms that do not go away after caring for yourself at home.  · Have pain that gets worse.  · Have pain that affects your ability to move or do your daily activities.  Summary  · Plantar fasciitis is a painful foot condition that affects the heel. It occurs when the band of tissue that connects the toes to the heel bone (plantar fascia) becomes irritated.  · The main symptom of this condition is heel pain that may be worse after exercising too much or standing still for a long time.  · Treatment varies, but it usually starts with rest, ice, compression, and elevation (RICE therapy) and over-the-counter medicines to manage pain.  This information is not intended to replace advice given to you by your health care provider. Make sure you discuss any questions you have with your health care provider.  Document Released: 09/12/2002 Document Revised: 11/30/2018 Document Reviewed: 10/15/2018  [employer] Patient Education © 2020 [employer]

## 2020-11-15 NOTE — PROGRESS NOTES
"Subjective:      Michel Angelo is a 25 y.o. female who presents with Foot Problem (L heel pain x2wks )    Patient presents with 2 weeks of heel pain.  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  SHe reports hx of prior heel fracture.         Foot Problem  This is a new problem. The current episode started 1 to 4 weeks ago. The problem occurs constantly. The problem has been unchanged. Pertinent negatives include no abdominal pain, change in bowel habit, chest pain, chills, fatigue, fever, joint swelling, myalgias, nausea, neck pain, numbness, rash, vomiting or weakness. The symptoms are aggravated by walking (Jumping). She has tried NSAIDs for the symptoms. The treatment provided mild relief.       Review of Systems   Constitutional: Negative for chills, fatigue and fever.   Respiratory: Negative for shortness of breath.    Cardiovascular: Negative for chest pain.   Gastrointestinal: Negative for abdominal pain, change in bowel habit, nausea and vomiting.   Musculoskeletal: Positive for joint pain. Negative for joint swelling, myalgias and neck pain.   Skin: Negative for rash.   Neurological: Negative for tingling, sensory change, weakness and numbness.   All other systems reviewed and are negative.         Objective:     /72   Pulse 68   Temp 36.1 °C (96.9 °F) (Temporal)   Ht 1.702 m (5' 7\")   Wt 72.6 kg (160 lb)   SpO2 100%   BMI 25.06 kg/m²      Physical Exam  Vitals signs reviewed.   Constitutional:       General: She is not in acute distress.     Appearance: Normal appearance.   HENT:      Head: Normocephalic.      Right Ear: External ear normal.      Left Ear: External ear normal.   Neck:      Musculoskeletal: Normal range of motion. No neck rigidity.   Cardiovascular:      Rate and Rhythm: Normal rate.      Pulses: Normal pulses.   Pulmonary:      Effort: Pulmonary effort is normal.   Abdominal:      Palpations: Abdomen is soft. "   Musculoskeletal: Normal range of motion.      Left foot: Normal range of motion and normal capillary refill. Tenderness and bony tenderness present. No swelling or crepitus.        Feet:    Skin:     General: Skin is warm.   Neurological:      Mental Status: She is alert and oriented to person, place, and time.   Psychiatric:         Mood and Affect: Mood normal.         Behavior: Behavior normal.       Reading Physician Reading Date Result Priority   Alex Morgan M.D.  [phone] 11/15/2020 Urgent Care      Narrative & Impression        11/15/2020 12:43 PM     HISTORY/REASON FOR EXAM:  Heel pain.        TECHNIQUE/EXAM DESCRIPTION AND NUMBER OF VIEWS:  2 views of the LEFT os calcis.     COMPARISON: None.     FINDINGS:  No acute fracture.  Normal trabecular pattern. There is minimal plantar calcaneal spurring.  Achilles shadow is within normal limits.     IMPRESSION:     Minimal plantar calcaneal spurring.               Assessment/Plan:        1. Pain of left heel  DX-OS CALCIS (HEEL) 2+ LEFT    REFERRAL TO SPORTS MEDICINE   2. Plantar fasciitis of left foot  REFERRAL TO SPORTS MEDICINE     Xray: no fracture or dislocation per radiology     We discussed using possible orthotics or supportive tennis shoes in the short term.    Splint at night  RICE therapy discussed  Gentle ROM exercises discussed  Ice/heat therapy discussed  May take over-the-counter Ibuprofen 600-800 mg every 8 hours as needed for pain  Rest, fluids encouraged.  AVS with medical info given.  Pt was in full understanding and agreement with the plan.  Follow-up as needed if symptoms worsen or fail to improve.

## 2020-12-16 ENCOUNTER — HOSPITAL ENCOUNTER (EMERGENCY)
Facility: MEDICAL CENTER | Age: 25
End: 2020-12-16
Attending: EMERGENCY MEDICINE
Payer: OTHER GOVERNMENT

## 2020-12-16 ENCOUNTER — APPOINTMENT (OUTPATIENT)
Dept: RADIOLOGY | Facility: MEDICAL CENTER | Age: 25
End: 2020-12-16
Attending: EMERGENCY MEDICINE
Payer: OTHER GOVERNMENT

## 2020-12-16 VITALS
HEART RATE: 70 BPM | HEIGHT: 67 IN | BODY MASS INDEX: 25.11 KG/M2 | WEIGHT: 160 LBS | SYSTOLIC BLOOD PRESSURE: 98 MMHG | DIASTOLIC BLOOD PRESSURE: 63 MMHG | OXYGEN SATURATION: 98 % | TEMPERATURE: 98.3 F | RESPIRATION RATE: 17 BRPM

## 2020-12-16 DIAGNOSIS — S06.0X0A CONCUSSION WITHOUT LOSS OF CONSCIOUSNESS, INITIAL ENCOUNTER: ICD-10-CM

## 2020-12-16 PROCEDURE — A9270 NON-COVERED ITEM OR SERVICE: HCPCS | Performed by: EMERGENCY MEDICINE

## 2020-12-16 PROCEDURE — 99283 EMERGENCY DEPT VISIT LOW MDM: CPT

## 2020-12-16 PROCEDURE — 700102 HCHG RX REV CODE 250 W/ 637 OVERRIDE(OP): Performed by: EMERGENCY MEDICINE

## 2020-12-16 PROCEDURE — 70450 CT HEAD/BRAIN W/O DYE: CPT

## 2020-12-16 PROCEDURE — 700111 HCHG RX REV CODE 636 W/ 250 OVERRIDE (IP): Performed by: EMERGENCY MEDICINE

## 2020-12-16 RX ORDER — ONDANSETRON 4 MG/1
4 TABLET, ORALLY DISINTEGRATING ORAL ONCE
Status: COMPLETED | OUTPATIENT
Start: 2020-12-16 | End: 2020-12-16

## 2020-12-16 RX ORDER — ACETAMINOPHEN 500 MG
1000 TABLET ORAL ONCE
Status: COMPLETED | OUTPATIENT
Start: 2020-12-16 | End: 2020-12-16

## 2020-12-16 RX ORDER — ONDANSETRON 4 MG/1
4 TABLET, ORALLY DISINTEGRATING ORAL EVERY 6 HOURS PRN
Qty: 10 TAB | Refills: 0 | Status: SHIPPED | OUTPATIENT
Start: 2020-12-16 | End: 2021-03-13

## 2020-12-16 RX ADMIN — ACETAMINOPHEN 1000 MG: 500 TABLET ORAL at 20:20

## 2020-12-16 RX ADMIN — ONDANSETRON 4 MG: 4 TABLET, ORALLY DISINTEGRATING ORAL at 20:05

## 2020-12-16 ASSESSMENT — FIBROSIS 4 INDEX: FIB4 SCORE: 0.4

## 2020-12-16 ASSESSMENT — LIFESTYLE VARIABLES: DO YOU DRINK ALCOHOL: NO

## 2020-12-17 NOTE — DISCHARGE INSTRUCTIONS
You were seen in the Emergency Department for head injury.    CT scan were completed without significant acute abnormalities.    Please use 1,000mg of tylenol or 600mg of ibuprofen every 6 hours as needed for pain.    Please follow up with your primary care physician.    Return to the Emergency Department with severe headaches, confusion, vomiting, or other concerns.

## 2020-12-17 NOTE — ED TRIAGE NOTES
Michel Angelo  25 y.o.  Chief Complaint   Patient presents with   • Head Injury     hit head on dryer door that was head. No LOC         Pt placed out in the lobby and educated on triage process. Pt advised to let staff know of any changes.

## 2020-12-17 NOTE — ED PROVIDER NOTES
ED Provider Note    Scribed for Olimpia Martinez M.D. by Monserrat Cormier. 12/16/2020  7:55 PM    Means of arrival: Walk in  History obtained from: patient   History limited by: none      CHIEF COMPLAINT  Chief Complaint   Patient presents with   • Head Injury     hit head on dryer door that was head. No LOC       HPI  Michel Angelo is a 25 y.o. female who presents to the Emergency Department for for head injury onset one hour ago. She describes that they have a stacked dryer and stood up causing her to hit the dryer door. Denies loss of consciousness but states she fell to the ground and closed her eyes due to the pain. Patient has associated nausea and states she feels slow and tired. She adds that her chest feels tight and she is slightly short of breath but thinks this may be related to anxiety.  Patient denies any vomiting or significant confusion.  She has not taken any medications prior to coming in for evaluation.    REVIEW OF SYSTEMS  Pertinent positive include head injury, nausea, confusion, and fatigued. Pertinent negative include loss of consciousness.     PAST MEDICAL HISTORY   has a past medical history of NEGATIVE HISTORY OF.    SOCIAL HISTORY  Social History     Tobacco Use   • Smoking status: Never Smoker   • Smokeless tobacco: Never Used   Substance and Sexual Activity   • Alcohol use: Yes     Alcohol/week: 0.0 oz     Frequency: 2-4 times a month     Drinks per session: 1 or 2     Binge frequency: Never     Comment: 2/month   • Drug use: No   • Sexual activity: Yes     Partners: Male     Birth control/protection: Condom       SURGICAL HISTORY   has a past surgical history that includes knee arthroscopy (Left, 05/2018); rhinoplasty (03/2019); knee arthroscopy (Right, 8/22/2019); medial meniscectomy (Right, 8/22/2019); and meniscal repair (Right, 8/22/2019).    CURRENT MEDICATIONS  Home Medications    **Home medications have not yet been reviewed for this encounter**         ALLERGIES  Allergies  "  Allergen Reactions   • Gluten Meal        PHYSICAL EXAM   VITAL SIGNS: /85   Pulse 89   Temp 37.2 °C (98.9 °F) (Temporal)   Resp 16   Ht 1.702 m (5' 7\")   Wt 72.6 kg (160 lb)   LMP 12/06/2020 (Approximate)   SpO2 98%   BMI 25.06 kg/m²    Constitutional: Nontoxic appearing young female.  Alert in no apparent distress.  HENT: Normocephalic. Small scalp hematoma on the superior scalp, Bilateral external ears normal. Nose normal.  Moist mucous membranes.  Oropharynx clear.  Eyes: Pupils are equal and reactive. Conjunctiva normal.   Neck: Supple, full range of motion, no midline neck tenderness  Heart: Regular rate and rhythm.  No murmurs.    Lungs: No respiratory distress, normal work of breathing. Lungs clear to auscultation bilaterally.  Abdomen Soft, no distention.  No tenderness to palpation.  Musculoskeletal: Atraumatic. No obvious deformities noted.  No lower extremity edema.  Skin: Warm, Dry.  No erythema, No rash.   Neurologic: Alert and oriented x3. Moving all extremities spontaneously without focal deficits.  Strength and sensation intact in all 4 extremities.  Psychiatric: Affect normal, Mood normal, Appears appropriate and not intoxicated.    DIAGNOSTIC STUDIES    RADIOLOGY  Personally reviewed by me  CT-HEAD W/O   Final Result         1.  No acute intracranial abnormality.          ED COURSE  Vitals:    12/16/20 1900 12/16/20 1915 12/16/20 2104   BP: 128/85  (!) 98/63   Pulse: 89  70   Resp: 16  17   Temp: 37.2 °C (98.9 °F)  36.8 °C (98.3 °F)   TempSrc: Temporal  Temporal   SpO2: 98%  98%   Weight:  72.6 kg (160 lb)    Height: 1.702 m (5' 7\") 1.702 m (5' 7\")        Medications administered:  Medications   ondansetron (ZOFRAN ODT) dispertab 4 mg (4 mg Oral Given 12/16/20 2005)   acetaminophen (TYLENOL) tablet 1,000 mg (1,000 mg Oral Given 12/16/20 2020)       7:55 PM Patient seen and examined at bedside. The patient presents with a head injury onset an hour ago. I discussed that she likely " just has a bad concussion however due to her symptoms we will obtain a CT to r/o a bleed. Ordered for CT head to evaluate. Patient will be treated with Zofran 4 mg and tylenol 1000 mg for her symptoms.     MEDICAL DECISION MAKING  Young otherwise healthy patient presents after minor head trauma.  She is nontoxic-appearing with normal vital signs on arrival.  She has no evidence of focal neurologic deficits however is slightly slow to respond and did have some dry heaving therefore imaging was obtained.  There is no evidence of intracranial hemorrhage or skull fracture.  No evidence of other traumatic injury on exam.  Patient will be discharged home with instructions on symptomatic care for concussion and head injury precautions.    8:45 PM Reviewed the patients CT which did not show any signs of a bleed or other abnormalities. Informed the patient of the results. Recommended ibuprofen and/or tylenol for pain.  Prescribed Zofran as needed for nausea. Discussed return precautions. Patient will be discharged at this time. She verbalizes agreement with discharge and plan of care.     The patient will return for new or worsening symptoms and is stable at the time of discharge.    DISPOSITION:  Patient will be discharged home in stable condition.    FOLLOW UP:  Gisselle Mcneil M.D.  [address] [place] [address] Dr [place]rey [place] [id number] [phone]    Schedule an appointment as soon as possible for a visit       [facility], Emergency Dept  [address] [place] [id number] [phone]    If symptoms worsen    IMPRESSION  (S06.0X0A) Concussion without loss of consciousness, initial encounter    Results, diagnoses, and treatment options were discussed with the patient and/or family. Patient verbalized understanding of plan of care.    Discharge Medication List as of 12/16/2020  8:57 PM      START taking these medications    Details   ondansetron (ZOFRAN ODT) 4 MG TABLET DISPERSIBLE Take 1 Tab by mouth every 6  hours as needed for Nausea., Disp-10 Tab, R-0, Print Rx Paper              Monserrat GURROLA (Scribe), am scribing for, and in the presence of, Olimpia Martinez M.D..    Electronically signed by: Monserrat Cormier (Scribe), 12/16/2020    Olimpia GURROLA M.D. personally performed the services described in this documentation, as scribed by Monserrat Cormier in my presence, and it is both accurate and complete. C    The note accurately reflects work and decisions made by me.  Olimpia Martinez M.D.  12/17/2020  12:24 AM

## 2021-03-13 ENCOUNTER — HOSPITAL ENCOUNTER (EMERGENCY)
Facility: MEDICAL CENTER | Age: 26
End: 2021-03-13
Attending: EMERGENCY MEDICINE
Payer: OTHER GOVERNMENT

## 2021-03-13 VITALS
WEIGHT: 160 LBS | OXYGEN SATURATION: 100 % | DIASTOLIC BLOOD PRESSURE: 85 MMHG | RESPIRATION RATE: 19 BRPM | TEMPERATURE: 98.4 F | BODY MASS INDEX: 25.11 KG/M2 | HEART RATE: 65 BPM | SYSTOLIC BLOOD PRESSURE: 116 MMHG | HEIGHT: 67 IN

## 2021-03-13 DIAGNOSIS — R42 ORTHOSTATIC DIZZINESS: ICD-10-CM

## 2021-03-13 DIAGNOSIS — R51.9 ACUTE INTRACTABLE HEADACHE, UNSPECIFIED HEADACHE TYPE: ICD-10-CM

## 2021-03-13 DIAGNOSIS — R42 VERTIGO: ICD-10-CM

## 2021-03-13 LAB
ALBUMIN SERPL BCP-MCNC: 4.6 G/DL (ref 3.2–4.9)
ALBUMIN/GLOB SERPL: 1.5 G/DL
ALP SERPL-CCNC: 61 U/L (ref 30–99)
ALT SERPL-CCNC: 26 U/L (ref 2–50)
ANION GAP SERPL CALC-SCNC: 10 MMOL/L (ref 7–16)
APPEARANCE UR: CLEAR
AST SERPL-CCNC: 24 U/L (ref 12–45)
BASOPHILS # BLD AUTO: 0.5 % (ref 0–1.8)
BASOPHILS # BLD: 0.04 K/UL (ref 0–0.12)
BILIRUB SERPL-MCNC: 0.2 MG/DL (ref 0.1–1.5)
BILIRUB UR QL STRIP.AUTO: NEGATIVE
BUN SERPL-MCNC: 17 MG/DL (ref 8–22)
CALCIUM SERPL-MCNC: 9.7 MG/DL (ref 8.4–10.2)
CHLORIDE SERPL-SCNC: 104 MMOL/L (ref 96–112)
CO2 SERPL-SCNC: 27 MMOL/L (ref 20–33)
COLOR UR: YELLOW
CREAT SERPL-MCNC: 0.92 MG/DL (ref 0.5–1.4)
EKG IMPRESSION: NORMAL
EOSINOPHIL # BLD AUTO: 0.08 K/UL (ref 0–0.51)
EOSINOPHIL NFR BLD: 1 % (ref 0–6.9)
ERYTHROCYTE [DISTWIDTH] IN BLOOD BY AUTOMATED COUNT: 38.6 FL (ref 35.9–50)
GLOBULIN SER CALC-MCNC: 3 G/DL (ref 1.9–3.5)
GLUCOSE SERPL-MCNC: 82 MG/DL (ref 65–99)
GLUCOSE UR STRIP.AUTO-MCNC: NEGATIVE MG/DL
HCG SERPL QL: NEGATIVE
HCT VFR BLD AUTO: 43.6 % (ref 37–47)
HGB BLD-MCNC: 14.4 G/DL (ref 12–16)
IMM GRANULOCYTES # BLD AUTO: 0.02 K/UL (ref 0–0.11)
IMM GRANULOCYTES NFR BLD AUTO: 0.3 % (ref 0–0.9)
KETONES UR STRIP.AUTO-MCNC: NEGATIVE MG/DL
LEUKOCYTE ESTERASE UR QL STRIP.AUTO: NEGATIVE
LYMPHOCYTES # BLD AUTO: 3.6 K/UL (ref 1–4.8)
LYMPHOCYTES NFR BLD: 46.1 % (ref 22–41)
MCH RBC QN AUTO: 28.8 PG (ref 27–33)
MCHC RBC AUTO-ENTMCNC: 33 G/DL (ref 33.6–35)
MCV RBC AUTO: 87.2 FL (ref 81.4–97.8)
MICRO URNS: NORMAL
MONOCYTES # BLD AUTO: 0.43 K/UL (ref 0–0.85)
MONOCYTES NFR BLD AUTO: 5.5 % (ref 0–13.4)
NEUTROPHILS # BLD AUTO: 3.64 K/UL (ref 2–7.15)
NEUTROPHILS NFR BLD: 46.6 % (ref 44–72)
NITRITE UR QL STRIP.AUTO: NEGATIVE
NRBC # BLD AUTO: 0 K/UL
NRBC BLD-RTO: 0 /100 WBC
PH UR STRIP.AUTO: 6.5 [PH] (ref 5–8)
PLATELET # BLD AUTO: 289 K/UL (ref 164–446)
PMV BLD AUTO: 12 FL (ref 9–12.9)
POTASSIUM SERPL-SCNC: 4.2 MMOL/L (ref 3.6–5.5)
PROT SERPL-MCNC: 7.6 G/DL (ref 6–8.2)
PROT UR QL STRIP: NEGATIVE MG/DL
RBC # BLD AUTO: 5 M/UL (ref 4.2–5.4)
RBC UR QL AUTO: NEGATIVE
SODIUM SERPL-SCNC: 141 MMOL/L (ref 135–145)
SP GR UR STRIP.AUTO: <=1.005
TSH SERPL DL<=0.005 MIU/L-ACNC: 1.96 UIU/ML (ref 0.38–5.33)
WBC # BLD AUTO: 7.8 K/UL (ref 4.8–10.8)

## 2021-03-13 PROCEDURE — 96375 TX/PRO/DX INJ NEW DRUG ADDON: CPT

## 2021-03-13 PROCEDURE — U0005 INFEC AGEN DETEC AMPLI PROBE: HCPCS

## 2021-03-13 PROCEDURE — 81003 URINALYSIS AUTO W/O SCOPE: CPT

## 2021-03-13 PROCEDURE — 96374 THER/PROPH/DIAG INJ IV PUSH: CPT

## 2021-03-13 PROCEDURE — 84703 CHORIONIC GONADOTROPIN ASSAY: CPT

## 2021-03-13 PROCEDURE — U0003 INFECTIOUS AGENT DETECTION BY NUCLEIC ACID (DNA OR RNA); SEVERE ACUTE RESPIRATORY SYNDROME CORONAVIRUS 2 (SARS-COV-2) (CORONAVIRUS DISEASE [COVID-19]), AMPLIFIED PROBE TECHNIQUE, MAKING USE OF HIGH THROUGHPUT TECHNOLOGIES AS DESCRIBED BY CMS-2020-01-R: HCPCS

## 2021-03-13 PROCEDURE — 84443 ASSAY THYROID STIM HORMONE: CPT

## 2021-03-13 PROCEDURE — 93005 ELECTROCARDIOGRAM TRACING: CPT | Performed by: EMERGENCY MEDICINE

## 2021-03-13 PROCEDURE — 80053 COMPREHEN METABOLIC PANEL: CPT

## 2021-03-13 PROCEDURE — 99285 EMERGENCY DEPT VISIT HI MDM: CPT

## 2021-03-13 PROCEDURE — 700111 HCHG RX REV CODE 636 W/ 250 OVERRIDE (IP): Performed by: EMERGENCY MEDICINE

## 2021-03-13 PROCEDURE — 700105 HCHG RX REV CODE 258: Performed by: EMERGENCY MEDICINE

## 2021-03-13 PROCEDURE — 93005 ELECTROCARDIOGRAM TRACING: CPT

## 2021-03-13 PROCEDURE — 85025 COMPLETE CBC W/AUTO DIFF WBC: CPT

## 2021-03-13 RX ORDER — SODIUM CHLORIDE, SODIUM LACTATE, POTASSIUM CHLORIDE, CALCIUM CHLORIDE 600; 310; 30; 20 MG/100ML; MG/100ML; MG/100ML; MG/100ML
1000 INJECTION, SOLUTION INTRAVENOUS ONCE
Status: COMPLETED | OUTPATIENT
Start: 2021-03-13 | End: 2021-03-13

## 2021-03-13 RX ORDER — PROCHLORPERAZINE EDISYLATE 5 MG/ML
10 INJECTION INTRAMUSCULAR; INTRAVENOUS ONCE
Status: COMPLETED | OUTPATIENT
Start: 2021-03-13 | End: 2021-03-13

## 2021-03-13 RX ORDER — DIPHENHYDRAMINE HYDROCHLORIDE 50 MG/ML
50 INJECTION INTRAMUSCULAR; INTRAVENOUS ONCE
Status: COMPLETED | OUTPATIENT
Start: 2021-03-13 | End: 2021-03-13

## 2021-03-13 RX ORDER — KETOROLAC TROMETHAMINE 30 MG/ML
15 INJECTION, SOLUTION INTRAMUSCULAR; INTRAVENOUS ONCE
Status: COMPLETED | OUTPATIENT
Start: 2021-03-13 | End: 2021-03-13

## 2021-03-13 RX ADMIN — SODIUM CHLORIDE, POTASSIUM CHLORIDE, SODIUM LACTATE AND CALCIUM CHLORIDE 1000 ML: 600; 310; 30; 20 INJECTION, SOLUTION INTRAVENOUS at 22:55

## 2021-03-13 RX ADMIN — DIPHENHYDRAMINE HYDROCHLORIDE 50 MG: 50 INJECTION, SOLUTION INTRAMUSCULAR; INTRAVENOUS at 20:35

## 2021-03-13 RX ADMIN — KETOROLAC TROMETHAMINE 15 MG: 30 INJECTION, SOLUTION INTRAMUSCULAR at 20:34

## 2021-03-13 RX ADMIN — PROCHLORPERAZINE EDISYLATE 10 MG: 5 INJECTION INTRAMUSCULAR; INTRAVENOUS at 20:35

## 2021-03-13 RX ADMIN — SODIUM CHLORIDE, POTASSIUM CHLORIDE, SODIUM LACTATE AND CALCIUM CHLORIDE 1000 ML: 600; 310; 30; 20 INJECTION, SOLUTION INTRAVENOUS at 21:29

## 2021-03-13 ASSESSMENT — FIBROSIS 4 INDEX: FIB4 SCORE: 0.4

## 2021-03-14 LAB
SARS-COV-2 RNA RESP QL NAA+PROBE: NOTDETECTED
SPECIMEN SOURCE: NORMAL

## 2021-03-14 NOTE — ED NOTES
IV discontinued with cathlon intact    Discharge home with mom.  Discharge instructions and follow up care reviewed and given to patient with verbal understanding.    Assist patient out of the ED via wheel chair in stable condition

## 2021-03-14 NOTE — ED NOTES
Pt instructed to have someone to drive home after benadryl administration. Pt agreed on not driving after receiving benadryl. Pt medicated as directed by ERP.

## 2021-03-14 NOTE — ED TRIAGE NOTES
25 yr old female to triage with mom  Chief Complaint   Patient presents with   • Dizziness     Patient report of dizziness for the past 3 days and headache since yesterday. Patient took tylenol 1000 mg at 1 pm yesterday    • Headache

## 2021-03-14 NOTE — ED PROVIDER NOTES
ED Provider Note    CHIEF COMPLAINT  Chief Complaint   Patient presents with   • Dizziness     Patient report of dizziness for the past 3 days and headache since yesterday. Patient took tylenol 1000 mg at 1 pm yesterday    • Headache       HPI  Michel Angelo is a 25 y.o. female who presents to the emergency department a couple of days of just generally feeling tired and fatigued.  She states she had a mild headache but no neck stiffness no fevers or chills she reports her balance feels off at least when she stands up she has had a history of vertigo and some dizziness before.  She states she is always orthostatic but feels worse lately.  No cough no chest pain no vomiting no diarrhea no constipation no dysuria no hematuria no constipation no diarrhea.  No fevers that she is aware of she denies vision changes or unilateral weakness numbness or tingling.    REVIEW OF SYSTEMS  Positives as above. Pertinent negatives include fevers chills cough congestion chest pain shortness of breath abdominal pain vomiting diarrhea constipation dysuria hematuria flank pain vaginal discharge itching or bleeding neck stiffness vision changes unilateral weakness numbness or tingling speech difficulty  All other review of systems are negative    PAST MEDICAL HISTORY   has a past medical history of NEGATIVE HISTORY OF.    SOCIAL HISTORY  Social History     Tobacco Use   • Smoking status: Never Smoker   • Smokeless tobacco: Never Used   Substance and Sexual Activity   • Alcohol use: Yes     Alcohol/week: 0.0 oz     Comment: 2/month   • Drug use: No   • Sexual activity: Yes     Partners: Male     Birth control/protection: Condom       SURGICAL HISTORY   has a past surgical history that includes knee arthroscopy (Left, 05/2018); rhinoplasty (03/2019); knee arthroscopy (Right, 8/22/2019); medial meniscectomy (Right, 8/22/2019); and meniscal repair (Right, 8/22/2019).    CURRENT MEDICATIONS  Home Medications     Reviewed by Natasha  "SILVANA Martinez (Registered Nurse) on 03/13/21 at 1932  Med List Status: Complete   Medication Last Dose Status        Patient Akin Taking any Medications                       ALLERGIES  Allergies   Allergen Reactions   • Gluten Meal        PHYSICAL EXAM  VITAL SIGNS: /71   Pulse 77   Temp 36.9 °C (98.4 °F) (Oral)   Resp 16   Ht 1.702 m (5' 7\")   Wt 72.6 kg (160 lb)   LMP 03/06/2021   SpO2 99%   BMI 25.06 kg/m²    Pulse ox interpretation: I interpret this pulse ox as normal.  Constitutional: Alert in no apparent distress.  HENT: Normocephalic atraumatic, mildly dry mucous membranes  Eyes: PERRLA without nystagmus, Conjunctiva normal, Non-icteric.   Neck: Normal range of motion, No tenderness, Supple, No stridor.   Cardiovascular: Regular rate and rhythm, no murmurs.   Thorax & Lungs: Normal breath sounds, No respiratory distress, No wheezing, No chest tenderness.   Abdomen: Bowel sounds normal, Soft, No tenderness, No pulsatile masses. No peritoneal signs.  Skin: Warm, Dry, No erythema, No rash.   Back: No bony tenderness, No CVA tenderness.   Extremities/MSK: Intact equal distal pulses, No edema, No tenderness, No cyanosis, no major deformities noted  Neurologic: Alert and oriented x3, Symmetric smile, eyes shut tight bilaterally, forehead wrinkles bilaterally, sensation intact to light touch bilateral face, tongue midline, head turn and shoulder shrug with full strength. Hearing intact grossly bilaterally. 5/5  strength bilaterally, 5/5 tricep and bicep strength bilaterally. Sensation intact to light touch r, m, u, axillary nerves bilaterally. 5/5 strength quadricep, plantarflexion/dorsiflexion/extensor hallicus longus bilaterally. Sensation intact to light touch bilateral lower extremities in all nerve distributions.  Intact finger-to-nose, no pronator drift        DIFFERENTIAL DIAGNOSIS AND WORK UP PLAN    This is a 25 y.o. female who presents with general feelings of unwell, she could be " orthostatic she does appear mildly dehydrated on my examination, she does not have any nystagmus her neurologic exam is otherwise within normal limit she does not have any neck stiffness there is no fevers associated low concern for meningitis or encephalitis low concern for stroke she is otherwise young and healthy.  She may be dehydrated she may have a viral syndrome including Covid she is a paramedic student said both her shots but can still evaluate for this.  Also evaluate for UTI thyroid dysfunction electrolyte abnormality.    DIAGNOSTIC STUDIES / PROCEDURES    EKG  Results for orders placed or performed during the hospital encounter of 21   EKG   Result Value Ref Range    Report       [facility] Emergency Dept.    Test Date:  2021  Pt Name:    JOHNY HASSAN            Department: EDS  MRN:        [id number]                      Room:  Gender:     Female                       Technician:   :        [id number]                   Requested By:ER TRIAGE PROTOCOL  Order #:    [id number]                    Reading MD: Maria Esther Ferraro MD    Measurements  Intervals                                Axis  Rate:       71                           P:          74  AK:         127                          QRS:        71  QRSD:       94                           T:          56  QT:         388  QTc:        422    Interpretive Statements  Sinus rhythm at a rate of 71 no ST elevations or ST depressions no abnormal T  wave inversions no pathognomonic Q waves normal intervals normal axis  Compared to ECG 2018 18:15:44  No significant changes  Electronically Signed On 3- 21:22:55 PST by Maria Esther Ferraro MD         LABS  Pertinent Lab Findings  CBC within normal limits CMP within normal limits urinalysis within normal limits Covid sent patient is not pregnant thyroid is within normal limits      RADIOLOGY  No orders to display     The radiologist's interpretation of all  "radiological studies have been reviewed by me.      COURSE & MEDICAL DECISION MAKING  Pertinent Labs & Imaging studies reviewed. (See chart for details)    9:10 PM  Had some adverse effects from compazine, but feeling better headache improved, doing orthostatics at this time still pending some lab results     9:22 PM  Orthostatics are positive she is going to receive some IV fluids and then be discharged home.  Her headache is completely resolved with the Toradol Compazine and Benadryl.  I discussed with the patient and her mother all of her laboratory findings everything seems to be within normal limits this may be an early viral syndrome or peripheral vertigo but there is no signs or symptoms that indicate severe infection including meningitis or stroke.  Her NIH score is 0 otherwise she feels comfortable going home.      Patient had a little dip in her blood pressure she is receiving a second liter of IV fluids but she is having a positive response she feels comfortable and feels better going home    /64   Pulse 89   Temp 36.9 °C (98.4 °F) (Oral)   Resp 20   Ht 1.702 m (5' 7\")   Wt 72.6 kg (160 lb)   LMP 03/06/2021   SpO2 99%   BMI 25.06 kg/m²       I verified that the patient was wearing a mask and I was wearing appropriate PPE every time I entered the room. The patient's mask was on the patient at all times during my encounter except for a brief view of the oropharynx.    The patient will return for new or worsening symptoms and is stable at the time of discharge.    The patient is referred to a primary physician for blood pressure management, diabetic screening, and for all other preventative health concerns.    DISPOSITION:  Patient will be discharged home in stable condition.    FOLLOW UP:  Gisselle Mcneil M.D.  [facility] [place] [id number] [phone]    Schedule an appointment as soon as possible for a visit       [facility], Emergency Dept  [address]  [place] [address]  [phone]    If symptoms worsen      OUTPATIENT MEDICATIONS:  New Prescriptions    No medications on file           FINAL IMPRESSION  1. Orthostatic dizziness     2. Acute intractable headache, unspecified headache type     3. Vertigo             Electronically signed by: Maria Esther Ferraro M.D., 3/13/2021 7:45 PM    This dictation has been created using voice recognition software and/or scribes. The accuracy of the dictation is limited by the abilities of the software and the expertise of the scribes. I expect there may be some errors of grammar and possibly content. I made every attempt to manually correct the errors within my dictation. However, errors related to voice recognition software and/or scribes may still exist and should be interpreted within the appropriate context.

## 2021-03-23 ENCOUNTER — OFFICE VISIT (OUTPATIENT)
Dept: MEDICAL GROUP | Facility: IMAGING CENTER | Age: 26
End: 2021-03-23
Payer: OTHER GOVERNMENT

## 2021-03-23 VITALS
BODY MASS INDEX: 26.21 KG/M2 | TEMPERATURE: 98.2 F | SYSTOLIC BLOOD PRESSURE: 102 MMHG | DIASTOLIC BLOOD PRESSURE: 66 MMHG | HEIGHT: 67 IN | RESPIRATION RATE: 12 BRPM | OXYGEN SATURATION: 100 % | HEART RATE: 78 BPM | WEIGHT: 167 LBS

## 2021-03-23 DIAGNOSIS — I95.9 HYPOTENSION, UNSPECIFIED HYPOTENSION TYPE: ICD-10-CM

## 2021-03-23 PROCEDURE — 99214 OFFICE O/P EST MOD 30 MIN: CPT | Performed by: FAMILY MEDICINE

## 2021-03-23 RX ORDER — ACETAMINOPHEN 325 MG/1
650 TABLET ORAL EVERY 4 HOURS PRN
COMMUNITY
End: 2021-10-17

## 2021-03-23 ASSESSMENT — FIBROSIS 4 INDEX: FIB4 SCORE: 0.37

## 2021-03-23 ASSESSMENT — ENCOUNTER SYMPTOMS
VOMITING: 0
EYE PAIN: 0
WHEEZING: 0
SHORTNESS OF BREATH: 0
DIARRHEA: 0
MYALGIAS: 0
ABDOMINAL PAIN: 0
FEVER: 0
CHILLS: 0
PALPITATIONS: 0
COUGH: 0
NAUSEA: 1
HEADACHES: 1
DIZZINESS: 1
DOUBLE VISION: 0

## 2021-03-23 ASSESSMENT — PAIN SCALES - GENERAL: PAINLEVEL: NO PAIN

## 2021-03-23 ASSESSMENT — PATIENT HEALTH QUESTIONNAIRE - PHQ9: CLINICAL INTERPRETATION OF PHQ2 SCORE: 0

## 2021-03-23 NOTE — PROGRESS NOTES
"Chief Complaint   Patient presents with   • Dizziness     still occuring every day    • Hypotension     81/46      HPI:  Reports long history of these symptoms though went to the er due to feeling poor for 3 days straight. She gets dizzy with nausea. Feels weak and dizzy after she eats. She feels faint like she is going to pass out. She also gets headaches with it as well. She was orthostatic positive in the er. She got 2 L in er and felt better after. Goes through 40oz x2. She eats normal diet only restriction is gluten. She does not restrict salt. She increased her salt two weeks ago did not help. Ha in am 3 times per week.     Allergies   Allergen Reactions   • Gluten Meal      Current Outpatient Medications   Medication Sig Dispense Refill   • acetaminophen (TYLENOL) 325 MG Tab Take 650 mg by mouth every four hours as needed.       No current facility-administered medications for this visit.     Social History     Tobacco Use   • Smoking status: Never Smoker   • Smokeless tobacco: Never Used   Substance Use Topics   • Alcohol use: Yes     Alcohol/week: 0.0 oz     Comment: once every couple of weeks   • Drug use: No     Family History   Problem Relation Age of Onset   • Hyperlipidemia Mother    • Hyperlipidemia Father    • Cancer Brother 14        adenocarcinoma- lung, carcinoid- appendix   • Genitourinary () Problems Other         cousin   • Diabetes Maternal Grandmother    • Breast Cancer Maternal Grandmother         postmenopausal       /66   Pulse 78   Temp 36.8 °C (98.2 °F)   Resp 12   Ht 1.702 m (5' 7\")   Wt 75.8 kg (167 lb)   SpO2 100%  Body mass index is 26.16 kg/m².  Review of Systems   Constitutional: Positive for malaise/fatigue. Negative for chills and fever.   HENT: Negative for hearing loss and tinnitus.    Eyes: Negative for double vision and pain.   Respiratory: Negative for cough, shortness of breath and wheezing.    Cardiovascular: Negative for chest pain, palpitations and leg " swelling.   Gastrointestinal: Positive for nausea. Negative for abdominal pain, diarrhea and vomiting.   Genitourinary: Negative for dysuria and frequency.   Musculoskeletal: Negative for joint pain and myalgias.   Skin: Negative for itching and rash.   Neurological: Positive for dizziness and headaches.     Physical Exam   Constitutional: She is well-developed, well-nourished, and in no distress. No distress.   HENT:   Head: Normocephalic and atraumatic.   Eyes: Pupils are equal, round, and reactive to light. Conjunctivae and EOM are normal. Right eye exhibits no discharge. Left eye exhibits no discharge. No scleral icterus.   Neck: No thyromegaly present.   Pulmonary/Chest: Effort normal. No respiratory distress.   Abdominal: She exhibits no distension.   Musculoskeletal:         General: No edema.   Neurological: She is alert.   Skin: Skin is warm and dry. She is not diaphoretic.   Psychiatric: Affect and judgment normal.     All labs (last 1 month):   Admission on 2021, Discharged on 2021   Component Date Value Ref Range Status   • Report 2021    Final                    Value:[facility].    Test Date:  2021  Pt Name:    JOHNY HASSAN            Department: [facility]  MRN:        [id number]                      Room:  Gender:     Female                       Technician:   :        1995                   Requested By:ER TRIAGE PROTOCOL  Order #:    [id number]                    Reading MD: Maria Esther Ferraro MD    Measurements  Intervals                                Axis  Rate:       71                           P:          74  NE:         127                          QRS:        71  QRSD:       94                           T:          56  QT:         388  QTc:        422    Interpretive Statements  Sinus rhythm at a rate of 71 no ST elevations or ST depressions no abnormal T  wave inversions no pathognomonic Q waves normal intervals normal  axis  Compared to ECG 05/27/2018 18:15:44  No significant changes  Electronically Signed On 3- 21:22:55 PST by Maria Esther Ferraro MD     • WBC 03/13/2021 7.8  4.8 - 10.8 K/uL Final   • RBC 03/13/2021 5.00  4.20 - 5.40 M/uL Final   • Hemoglobin 03/13/2021 14.4  12.0 - 16.0 g/dL Final   • Hematocrit 03/13/2021 43.6  37.0 - 47.0 % Final   • MCV 03/13/2021 87.2  81.4 - 97.8 fL Final   • MCH 03/13/2021 28.8  27.0 - 33.0 pg Final   • MCHC 03/13/2021 33.0* 33.6 - 35.0 g/dL Final   • RDW 03/13/2021 38.6  35.9 - 50.0 fL Final   • Platelet Count 03/13/2021 289  164 - 446 K/uL Final   • MPV 03/13/2021 12.0  9.0 - 12.9 fL Final   • Neutrophils-Polys 03/13/2021 46.60  44.00 - 72.00 % Final   • Lymphocytes 03/13/2021 46.10* 22.00 - 41.00 % Final   • Monocytes 03/13/2021 5.50  0.00 - 13.40 % Final   • Eosinophils 03/13/2021 1.00  0.00 - 6.90 % Final   • Basophils 03/13/2021 0.50  0.00 - 1.80 % Final   • Immature Granulocytes 03/13/2021 0.30  0.00 - 0.90 % Final   • Nucleated RBC 03/13/2021 0.00  /100 WBC Final   • Neutrophils (Absolute) 03/13/2021 3.64  2.00 - 7.15 K/uL Final    Includes immature neutrophils, if present.   • Lymphs (Absolute) 03/13/2021 3.60  1.00 - 4.80 K/uL Final   • Monos (Absolute) 03/13/2021 0.43  0.00 - 0.85 K/uL Final   • Eos (Absolute) 03/13/2021 0.08  0.00 - 0.51 K/uL Final   • Baso (Absolute) 03/13/2021 0.04  0.00 - 0.12 K/uL Final   • Immature Granulocytes (abs) 03/13/2021 0.02  0.00 - 0.11 K/uL Final   • NRBC (Absolute) 03/13/2021 0.00  K/uL Final   • Sodium 03/13/2021 141  135 - 145 mmol/L Final   • Potassium 03/13/2021 4.2  3.6 - 5.5 mmol/L Final   • Chloride 03/13/2021 104  96 - 112 mmol/L Final   • Co2 03/13/2021 27  20 - 33 mmol/L Final   • Anion Gap 03/13/2021 10.0  7.0 - 16.0 Final   • Glucose 03/13/2021 82  65 - 99 mg/dL Final   • Bun 03/13/2021 17  8 - 22 mg/dL Final   • Creatinine 03/13/2021 0.92  0.50 - 1.40 mg/dL Final   • Calcium 03/13/2021 9.7  8.4 - 10.2 mg/dL Final   • AST(SGOT)  "03/13/2021 24  12 - 45 U/L Final   • ALT(SGPT) 03/13/2021 26  2 - 50 U/L Final   • Alkaline Phosphatase 03/13/2021 61  30 - 99 U/L Final   • Total Bilirubin 03/13/2021 0.2  0.1 - 1.5 mg/dL Final   • Albumin 03/13/2021 4.6  3.2 - 4.9 g/dL Final   • Total Protein 03/13/2021 7.6  6.0 - 8.2 g/dL Final   • Globulin 03/13/2021 3.0  1.9 - 3.5 g/dL Final   • A-G Ratio 03/13/2021 1.5  g/dL Final   • Color 03/13/2021 Yellow   Final   • Character 03/13/2021 Clear   Final   • Specific Gravity 03/13/2021 <=1.005  <1.035 Final   • Ph 03/13/2021 6.5  5.0 - 8.0 Final   • Glucose 03/13/2021 Negative  Negative mg/dL Final   • Ketones 03/13/2021 Negative  Negative mg/dL Final   • Protein 03/13/2021 Negative  Negative mg/dL Final   • Bilirubin 03/13/2021 Negative  Negative Final   • Nitrite 03/13/2021 Negative  Negative Final   • Leukocyte Esterase 03/13/2021 Negative  Negative Final   • Occult Blood 03/13/2021 Negative  Negative Final   • Micro Urine Req 03/13/2021 see below   Final    Comment: Microscopic examination not performed when specimen is clear  and chemically negative for protein, blood, leukocyte esterase  and nitrite.     • SARS-CoV-2 Source 03/13/2021 NP Swab   Final   • SARS-CoV-2 by PCR 03/13/2021 NotDetected   Final    Comment: PATIENTS: Important information regarding your results and instructions can  be found at [email or web address]   \"After your  Covid-19 Test\"  [facility] providers: PLEASE REFER TO DE-ESCALATION AND RETESTING PROTOCOL  on [email or web address]  **The Roche SARS-CoV-2 RT-PCR test has been made available for use under the  Emergency Use Authorization (EUA) only.     • Beta-Hcg Qualitative Serum 03/13/2021 Negative  Negative Final   • TSH 03/13/2021 1.960  0.380 - 5.330 uIU/mL Final    Comment: Please note new reference ranges effective 12/19/2017 12:30 PM  Pregnant Females, 1st Trimester  0.050-3.700  Pregnant Females, 2nd Trimester  0.310-4.350  Pregnant Females, 3rd Trimester  " 0.410-5.180     • GFR If  03/13/2021 >60  >60 mL/min/1.73 m 2 Final   • GFR If Non  03/13/2021 >60  >60 mL/min/1.73 m 2 Final       Lipids:   Lab Results   Component Value Date/Time    CHOLSTRLTOT 165 04/03/2017 07:38 AM    TRIGLYCERIDE 72 04/03/2017 07:38 AM    HDL 49 04/03/2017 07:38 AM     (H) 04/03/2017 07:38 AM       Imaging: No results found.    A/P  Ct head 12/2020 normal without contrast  Return after workup  Review of orthostatics in er. I do not see a positive orthostatic blood pressure response. Plus given her blood pressure frequently falls to the 80s and she is very symptomatic we should investigate further.   Return if symptoms worsen or fail to improve.    Problem List Items Addressed This Visit     Hypotension    Relevant Orders    EC-ECHOCARDIOGRAM COMPLETE W/O CONT    OSMOLALITY URINE    OSMOLALITY SERUM    REFERRAL TO CARDIOLOGY    Comp Metabolic Panel    VASOPRESSIN(ADH)    MR-BRAIN-WITH & W/O    REFERRAL TO ENDOCRINOLOGY    TSH WITH REFLEX TO FT4          Portions of this note may be dictated using Dragon NaturallySpeaking voice recognition software.  Variances in spelling and vocabulary are possible and unintentional.  Not all areas may be caught/corrected.  Please notify me if any discrepancies are noted or if the meaning of any statement is not correct/clear.

## 2021-04-08 ENCOUNTER — APPOINTMENT (OUTPATIENT)
Dept: CARDIOLOGY | Facility: MEDICAL CENTER | Age: 26
End: 2021-04-08
Attending: FAMILY MEDICINE
Payer: OTHER GOVERNMENT

## 2021-04-16 ENCOUNTER — HOSPITAL ENCOUNTER (OUTPATIENT)
Dept: CARDIOLOGY | Facility: MEDICAL CENTER | Age: 26
End: 2021-04-16
Attending: FAMILY MEDICINE
Payer: OTHER GOVERNMENT

## 2021-04-16 DIAGNOSIS — I95.9 HYPOTENSION, UNSPECIFIED HYPOTENSION TYPE: ICD-10-CM

## 2021-04-16 LAB
LV EJECT FRACT  99904: 65
LV EJECT FRACT MOD 2C 99903: 50.82
LV EJECT FRACT MOD 4C 99902: 77.34
LV EJECT FRACT MOD BP 99901: 67.05

## 2021-04-16 PROCEDURE — 93306 TTE W/DOPPLER COMPLETE: CPT | Mod: 26 | Performed by: INTERNAL MEDICINE

## 2021-04-16 PROCEDURE — 93306 TTE W/DOPPLER COMPLETE: CPT

## 2021-06-14 DIAGNOSIS — R42 DIZZINESS: ICD-10-CM

## 2021-07-12 RX ORDER — SCOLOPAMINE TRANSDERMAL SYSTEM 1 MG/1
1 PATCH, EXTENDED RELEASE TRANSDERMAL
Qty: 4 PATCH | Refills: 3 | Status: SHIPPED | OUTPATIENT
Start: 2021-07-12 | End: 2021-10-17

## 2021-09-16 ENCOUNTER — HOSPITAL ENCOUNTER (OUTPATIENT)
Dept: LAB | Facility: MEDICAL CENTER | Age: 26
End: 2021-09-16
Attending: FAMILY MEDICINE
Payer: OTHER GOVERNMENT

## 2021-09-16 DIAGNOSIS — Z11.1 SCREENING-PULMONARY TB: ICD-10-CM

## 2021-09-16 PROCEDURE — 86480 TB TEST CELL IMMUN MEASURE: CPT

## 2021-09-16 PROCEDURE — 36415 COLL VENOUS BLD VENIPUNCTURE: CPT

## 2021-09-20 LAB
GAMMA INTERFERON BACKGROUND BLD IA-ACNC: 0.08 IU/ML
M TB IFN-G BLD-IMP: NEGATIVE
M TB IFN-G CD4+ BCKGRND COR BLD-ACNC: 0.13 IU/ML
MITOGEN IGNF BCKGRD COR BLD-ACNC: >10 IU/ML
QFT TB2 - NIL TBQ2: 0.1 IU/ML

## 2021-10-17 ENCOUNTER — OFFICE VISIT (OUTPATIENT)
Dept: URGENT CARE | Facility: CLINIC | Age: 26
End: 2021-10-17
Payer: OTHER GOVERNMENT

## 2021-10-17 VITALS
RESPIRATION RATE: 14 BRPM | BODY MASS INDEX: 25.11 KG/M2 | DIASTOLIC BLOOD PRESSURE: 76 MMHG | WEIGHT: 160 LBS | TEMPERATURE: 98.1 F | OXYGEN SATURATION: 97 % | SYSTOLIC BLOOD PRESSURE: 108 MMHG | HEIGHT: 67 IN | HEART RATE: 82 BPM

## 2021-10-17 DIAGNOSIS — J98.8 RTI (RESPIRATORY TRACT INFECTION): ICD-10-CM

## 2021-10-17 DIAGNOSIS — J32.9 RHINOSINUSITIS: ICD-10-CM

## 2021-10-17 PROCEDURE — 99213 OFFICE O/P EST LOW 20 MIN: CPT | Performed by: FAMILY MEDICINE

## 2021-10-17 RX ORDER — METHYLPREDNISOLONE 4 MG/1
TABLET ORAL
Qty: 21 TABLET | Refills: 0 | Status: SHIPPED | OUTPATIENT
Start: 2021-10-17 | End: 2021-12-17

## 2021-10-17 RX ORDER — DOXYCYCLINE HYCLATE 100 MG
100 TABLET ORAL EVERY 12 HOURS
Qty: 14 TABLET | Refills: 0 | Status: SHIPPED | OUTPATIENT
Start: 2021-10-17 | End: 2021-10-24

## 2021-10-17 ASSESSMENT — ENCOUNTER SYMPTOMS
COUGH: 1
SINUS PAIN: 1
WHEEZING: 1

## 2021-10-17 ASSESSMENT — FIBROSIS 4 INDEX: FIB4 SCORE: 0.39

## 2021-10-17 NOTE — PROGRESS NOTES
"Subjective     Michel Angelo is a 26 y.o. female who presents with Cough (x 2 weeks ) and Runny Nose      - This is a pleasant and nontoxic appearing 26 y.o. female with c/o 1.5-2 wks w/ sinus pain pressure stuffy runny nose, cough w/ colorful sputum. No NVFC/cp/sob. Feels well otherwise.       ALLERGIES:  Gluten meal     PMH:  Past Medical History:   Diagnosis Date   • NEGATIVE HISTORY OF         PSH:  Past Surgical History:   Procedure Laterality Date   • KNEE ARTHROSCOPY Right 8/22/2019    Procedure: ARTHROSCOPY, KNEE;  Surgeon: Vinay Moore M.D.;  Location: SURGERY [facility];  Service: Orthopedics   • MEDIAL MENISCECTOMY Right 8/22/2019    Procedure: MENISCECTOMY, KNEE, MEDIAL - PARTIAL VERSUS;  Surgeon: Vinay Moore M.D.;  Location: [facility];  Service: Orthopedics   • MENISCAL REPAIR Right 8/22/2019    Procedure: REPAIR, MENISCUS, KNEE - PROCEED AS INDICATED;  Surgeon: Vinay Moore M.D.;  Location: [facility];  Service: Orthopedics   • RHINOPLASTY  03/2019   • KNEE ARTHROSCOPY Left 05/2018       MEDS:    Current Outpatient Medications:   •  methylPREDNISolone (MEDROL DOSEPAK) 4 MG Tablet Therapy Pack, Follow schedule on package instructions., Disp: 21 Tablet, Rfl: 0  •  doxycycline (VIBRAMYCIN) 100 MG Tab, Take 1 Tablet by mouth every 12 hours for 7 days., Disp: 14 Tablet, Rfl: 0    ** I have documented what I find to be significant in regards to past medical, social, family and surgical history  in my HPI or under PMH/PSH/FH review section, otherwise it is noncontributory **           HPI    Review of Systems   HENT: Positive for congestion and sinus pain.    Respiratory: Positive for cough and wheezing.    All other systems reviewed and are negative.             Objective     /76   Pulse 82   Temp 36.7 °C (98.1 °F) (Temporal)   Resp 14   Ht 1.702 m (5' 7\")   Wt 72.6 kg (160 lb)   SpO2 97%   BMI 25.06 kg/m²      Physical " Exam  Vitals and nursing note reviewed.   Constitutional:       General: She is not in acute distress.     Appearance: Normal appearance. She is well-developed.   HENT:      Head: Normocephalic and atraumatic.      Nose: Congestion and rhinorrhea present.      Mouth/Throat:      Mouth: Mucous membranes are moist.      Pharynx: Oropharynx is clear. No oropharyngeal exudate or posterior oropharyngeal erythema.   Eyes:      General: No scleral icterus.  Cardiovascular:      Heart sounds: Normal heart sounds. No murmur heard.     Pulmonary:      Effort: Pulmonary effort is normal. No respiratory distress.      Breath sounds: Normal breath sounds.   Skin:     Coloration: Skin is not jaundiced or pale.   Neurological:      Mental Status: She is alert.      Motor: No abnormal muscle tone.   Psychiatric:         Mood and Affect: Mood normal.         Behavior: Behavior normal.                             Assessment & Plan          1. RTI (respiratory tract infection)     2. Rhinosinusitis  methylPREDNISolone (MEDROL DOSEPAK) 4 MG Tablet Therapy Pack    doxycycline (VIBRAMYCIN) 100 MG Tab       - Dx, plan & d/c instructions discussed   - Rest, stay hydrated, OTC Motrin and/or Tylenol as needed  - E.R. precautions discussed     Asked to kindly follow up with their PCP's office in 2-3 days for a recheck, ER if not improving or feeling/getting worse.    Any realistic side effects of medications that may have been given today reviewed.     Patient left in stable condition

## 2021-12-17 ENCOUNTER — HOSPITAL ENCOUNTER (OUTPATIENT)
Facility: MEDICAL CENTER | Age: 26
End: 2021-12-17
Attending: CLINICAL NURSE SPECIALIST
Payer: OTHER GOVERNMENT

## 2021-12-17 ENCOUNTER — OFFICE VISIT (OUTPATIENT)
Dept: MEDICAL GROUP | Facility: IMAGING CENTER | Age: 26
End: 2021-12-17
Payer: OTHER GOVERNMENT

## 2021-12-17 VITALS
DIASTOLIC BLOOD PRESSURE: 68 MMHG | SYSTOLIC BLOOD PRESSURE: 110 MMHG | TEMPERATURE: 97.8 F | RESPIRATION RATE: 12 BRPM | BODY MASS INDEX: 24.74 KG/M2 | HEART RATE: 77 BPM | HEIGHT: 67 IN | OXYGEN SATURATION: 99 % | WEIGHT: 157.6 LBS

## 2021-12-17 DIAGNOSIS — Z12.4 SCREENING FOR CERVICAL CANCER: ICD-10-CM

## 2021-12-17 DIAGNOSIS — Z11.51 SCREENING FOR HPV (HUMAN PAPILLOMAVIRUS): ICD-10-CM

## 2021-12-17 DIAGNOSIS — R35.0 URINARY FREQUENCY: ICD-10-CM

## 2021-12-17 DIAGNOSIS — N94.89 MASS OF FEMALE GENITAL STRUCTURE: ICD-10-CM

## 2021-12-17 DIAGNOSIS — Z76.89 ENCOUNTER TO ESTABLISH CARE WITH NEW DOCTOR: ICD-10-CM

## 2021-12-17 LAB
APPEARANCE UR: CLEAR
BILIRUB UR STRIP-MCNC: NEGATIVE MG/DL
COLOR UR AUTO: YELLOW
GLUCOSE UR STRIP.AUTO-MCNC: NEGATIVE MG/DL
KETONES UR STRIP.AUTO-MCNC: NEGATIVE MG/DL
LEUKOCYTE ESTERASE UR QL STRIP.AUTO: NEGATIVE
NITRITE UR QL STRIP.AUTO: NEGATIVE
PH UR STRIP.AUTO: 7 [PH] (ref 5–8)
PROT UR QL STRIP: NEGATIVE MG/DL
RBC UR QL AUTO: NEGATIVE
SP GR UR STRIP.AUTO: 1.01
UROBILINOGEN UR STRIP-MCNC: 0.2 MG/DL

## 2021-12-17 PROCEDURE — 87591 N.GONORRHOEAE DNA AMP PROB: CPT

## 2021-12-17 PROCEDURE — 87624 HPV HI-RISK TYP POOLED RSLT: CPT

## 2021-12-17 PROCEDURE — 99213 OFFICE O/P EST LOW 20 MIN: CPT | Performed by: CLINICAL NURSE SPECIALIST

## 2021-12-17 PROCEDURE — 87491 CHLMYD TRACH DNA AMP PROBE: CPT

## 2021-12-17 PROCEDURE — 87480 CANDIDA DNA DIR PROBE: CPT

## 2021-12-17 PROCEDURE — 87660 TRICHOMONAS VAGIN DIR PROBE: CPT

## 2021-12-17 PROCEDURE — 87510 GARDNER VAG DNA DIR PROBE: CPT

## 2021-12-17 PROCEDURE — 81002 URINALYSIS NONAUTO W/O SCOPE: CPT | Performed by: CLINICAL NURSE SPECIALIST

## 2021-12-17 PROCEDURE — 88175 CYTOPATH C/V AUTO FLUID REDO: CPT

## 2021-12-17 ASSESSMENT — FIBROSIS 4 INDEX: FIB4 SCORE: 0.39

## 2021-12-17 NOTE — PROGRESS NOTES
"Subjective     Michel Angelo is a 26 y.o. female who presents with Bump (on labia, pea sized, x 3 days )            HPI    Michel is in clinic to establish care and to address a new mass on her labia x 2 days.  She noticed a small hard bump on her labia 2 days ago which has decreased in size. She is sexually active with a new partner and denies dysuria, vaginal discharge, itching, burning, pain, foul odor. She reports chronic urinary frequency which has become more frequent recently. She drinks copious amounts of water and coffee.  She has a family history of cancer, brother with carcinoid and mother with CLL. 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        ROS  See HPI      Allergies   Allergen Reactions   • Gluten Meal        No current outpatient medications on file prior to visit.     No current facility-administered medications on file prior to visit.           Objective     /68 (BP Location: Left arm, Patient Position: Sitting, BP Cuff Size: Adult)   Pulse 77   Temp 36.6 °C (97.8 °F) (Temporal)   Resp 12   Ht 1.702 m (5' 7\")   Wt 71.5 kg (157 lb 9.6 oz)   LMP 11/16/2021   SpO2 99%   BMI 24.68 kg/m²      Physical Exam  Exam conducted with a chaperone present.   Constitutional:       General: She is not in acute distress.     Appearance: Normal appearance. She is not ill-appearing, toxic-appearing or diaphoretic.   Eyes:      General: No scleral icterus.        Right eye: No discharge.         Left eye: No discharge.   Cardiovascular:      Rate and Rhythm: Normal rate.   Pulmonary:      Effort: Pulmonary effort is normal.   Genitourinary:     Exam position: Supine.      Labia:         Right: No tenderness.         Left: No tenderness.       Vagina: No vaginal discharge.      Comments:   Pelvic exam:  Labia: Right labia minora with small, approximately 3mm firm, round nodule with white center.   Perineum: No external lesions are noted, color is symmetrical " throughout  Vagina: Vaginal vault is well rugated.  Cervix: Parous, nonfriable  Anus: external hemorrhoid, not engorged, nontender, no bleeding    Pap was performed and sent to the lab    Lymphadenopathy:      Lower Body: No right inguinal adenopathy. No left inguinal adenopathy.   Neurological:      Mental Status: She is alert and oriented to person, place, and time.   Psychiatric:         Mood and Affect: Mood normal.         Behavior: Behavior normal.                   Assessment & Plan        1. Urinary frequency  POCT UA negative.  Her increased frequency is likely related to her liquid intake and coffee.  Acupuncture recommended.    - URINALYSIS,CULTURE IF INDICATED; Future  - POCT Urinalysis    2. Mass of female genital structure  Small, approximately 3mm, round closed comedone seen on right labia.  Comedone is nontender.  Madeliene is to apply warm compresses to the area.  If the mass does not resolve or if it grows or becomes painful, she is to return to care.  As she is sexually active and with a new partner, a PAP with STD testing was also performed. No abnormal discharge, lesion, odor appreciated.       - THINPREP PAP W/HPV ; Future  - CHLAMYDIA/GC PCR URINE OR SWAB; Future  - VAGINAL PATHOGENS DNA PANEL; Future    3. Encounter to establish care with new doctor  Bowen stated she would like to establish care with me today.      4. Screening for cervical cancer  See above    5. Screening for HPV (human papillomavirus)  See above    Return if symptoms worsen or fail to improve, for annual/wellness.  .

## 2021-12-18 DIAGNOSIS — N94.89 MASS OF FEMALE GENITAL STRUCTURE: ICD-10-CM

## 2021-12-18 LAB
C TRACH DNA SPEC QL NAA+PROBE: NEGATIVE
N GONORRHOEA DNA SPEC QL NAA+PROBE: NEGATIVE
SPECIMEN SOURCE: NORMAL

## 2021-12-19 LAB
CANDIDA DNA VAG QL PROBE+SIG AMP: NEGATIVE
G VAGINALIS DNA VAG QL PROBE+SIG AMP: NEGATIVE
T VAGINALIS DNA VAG QL PROBE+SIG AMP: NEGATIVE

## 2021-12-20 DIAGNOSIS — N94.89 MASS OF FEMALE GENITAL STRUCTURE: ICD-10-CM

## 2021-12-21 LAB
CYTOLOGY REG CYTOL: NORMAL
HPV HR 12 DNA CVX QL NAA+PROBE: NEGATIVE
HPV16 DNA SPEC QL NAA+PROBE: NEGATIVE
HPV18 DNA SPEC QL NAA+PROBE: NEGATIVE
SPECIMEN SOURCE: NORMAL

## 2022-02-08 ENCOUNTER — OFFICE VISIT (OUTPATIENT)
Dept: URGENT CARE | Facility: CLINIC | Age: 27
End: 2022-02-08
Payer: OTHER GOVERNMENT

## 2022-02-08 ENCOUNTER — HOSPITAL ENCOUNTER (OUTPATIENT)
Facility: MEDICAL CENTER | Age: 27
End: 2022-02-08
Attending: NURSE PRACTITIONER
Payer: OTHER GOVERNMENT

## 2022-02-08 VITALS
BODY MASS INDEX: 24.33 KG/M2 | HEART RATE: 110 BPM | HEIGHT: 67 IN | OXYGEN SATURATION: 98 % | RESPIRATION RATE: 18 BRPM | TEMPERATURE: 97.7 F | SYSTOLIC BLOOD PRESSURE: 102 MMHG | DIASTOLIC BLOOD PRESSURE: 68 MMHG | WEIGHT: 155 LBS

## 2022-02-08 DIAGNOSIS — L73.1 INGROWN HAIR: ICD-10-CM

## 2022-02-08 DIAGNOSIS — J02.9 PHARYNGITIS, UNSPECIFIED ETIOLOGY: ICD-10-CM

## 2022-02-08 DIAGNOSIS — J03.90 TONSILLITIS: ICD-10-CM

## 2022-02-08 PROCEDURE — 87070 CULTURE OTHR SPECIMN AEROBIC: CPT

## 2022-02-08 PROCEDURE — 99214 OFFICE O/P EST MOD 30 MIN: CPT | Performed by: NURSE PRACTITIONER

## 2022-02-08 RX ORDER — AMOXICILLIN 500 MG/1
500 CAPSULE ORAL 2 TIMES DAILY
Qty: 20 CAPSULE | Refills: 0 | Status: SHIPPED | OUTPATIENT
Start: 2022-02-08 | End: 2022-02-09

## 2022-02-08 ASSESSMENT — ENCOUNTER SYMPTOMS
COUGH: 0
SHORTNESS OF BREATH: 0
HEADACHES: 0
FEVER: 0
DIZZINESS: 0
SORE THROAT: 1
VOMITING: 0
MYALGIAS: 0
NAUSEA: 0
SWOLLEN GLANDS: 1
TROUBLE SWALLOWING: 1
CHILLS: 0
EYE PAIN: 0

## 2022-02-08 ASSESSMENT — FIBROSIS 4 INDEX: FIB4 SCORE: 0.39

## 2022-02-08 NOTE — PROGRESS NOTES
Subjective:   Michel Angelo is a 26 y.o. female who presents for Pharyngitis ((L) Swollen tonsils, ear pressure, feverish x 2 days ) and Abscess (Vaginal area x 3 days )      Pharyngitis   This is a new problem. Episode onset: 2 days. The problem has been gradually worsening. The pain is worse on the left side. The maximum temperature recorded prior to her arrival was 100.4 - 100.9 F. The fever has been present for less than 1 day. The pain is at a severity of 10/10. The pain is severe. Associated symptoms include ear pain, swollen glands and trouble swallowing. Pertinent negatives include no congestion, coughing, ear discharge, headaches, shortness of breath or vomiting. She has had no exposure to strep or mono. She has tried acetaminophen for the symptoms. The treatment provided no relief.   She also notes a possible ingrown hair that is infected in the left groin region.  She states that it does wax and wane has improved since last evening without any intervention.    Review of Systems   Constitutional: Negative for chills and fever.   HENT: Positive for ear pain, sore throat and trouble swallowing. Negative for congestion and ear discharge.    Eyes: Negative for pain.   Respiratory: Negative for cough and shortness of breath.    Cardiovascular: Negative for chest pain.   Gastrointestinal: Negative for nausea and vomiting.   Genitourinary: Negative for hematuria.   Musculoskeletal: Negative for myalgias.   Skin: Negative for rash.        Ingrown hair left groin   Neurological: Negative for dizziness and headaches.       Medications:    • amoxicillin Caps    Allergies: Gluten meal    Problem List: Michel Angelo does not have any pertinent problems on file.    Surgical History:  Past Surgical History:   Procedure Laterality Date   • KNEE ARTHROSCOPY Right 8/22/2019    Procedure: ARTHROSCOPY, KNEE;  Surgeon: Vinay Moore M.D.;  Location: SURGERY [facility];  Service: Orthopedics   •  "MENISCECTOMY, KNEE, MEDIAL Right 8/22/2019    Procedure: MENISCECTOMY, KNEE, MEDIAL - PARTIAL VERSUS;  Surgeon: Vinay Moore M.D.;  Location: SURGERY [facility];  Service: Orthopedics   • MENISCAL REPAIR Right 8/22/2019    Procedure: REPAIR, MENISCUS, KNEE - PROCEED AS INDICATED;  Surgeon: Vinay Moore M.D.;  Location: SURGERY [facility];  Service: Orthopedics   • RHINOPLASTY  03/2019   • KNEE ARTHROSCOPY Left 05/2018       Past Social Hx: Michel Angelo  reports that she has never smoked. She has never used smokeless tobacco. She reports current alcohol use. She reports that she does not use drugs.     Past Family Hx:  Michel Angelo family history includes Breast Cancer in her maternal grandmother; Cancer (age of onset: 14) in her brother; Diabetes in her maternal grandmother; Genitourinary () Problems in an other family member; Hyperlipidemia in her father and mother.     Problem list, medications, and allergies reviewed by myself today in Epic.     Objective:     /68   Pulse (!) 110   Temp 36.5 °C (97.7 °F)   Resp 18   Ht 1.702 m (5' 7\")   Wt 70.3 kg (155 lb)   SpO2 98%   BMI 24.28 kg/m²     Physical Exam  Vitals and nursing note reviewed.   Constitutional:       General: She is not in acute distress.     Appearance: She is well-developed.   HENT:      Head: Normocephalic and atraumatic.      Right Ear: External ear normal.      Left Ear: External ear normal.      Nose: Nose normal.      Mouth/Throat:      Mouth: Mucous membranes are moist.      Pharynx: Pharyngeal swelling and posterior oropharyngeal erythema present.      Tonsils: No tonsillar exudate. 3+ on the left.   Eyes:      Conjunctiva/sclera: Conjunctivae normal.   Cardiovascular:      Rate and Rhythm: Normal rate.   Pulmonary:      Effort: Pulmonary effort is normal. No respiratory distress.      Breath sounds: Normal breath sounds.   Abdominal:      General: There is no distension. "   Musculoskeletal:         General: Normal range of motion.   Skin:     General: Skin is warm and dry.      Findings: Erythema present.          Neurological:      General: No focal deficit present.      Mental Status: She is alert and oriented to person, place, and time. Mental status is at baseline.      Gait: Gait (gait at baseline) normal.   Psychiatric:         Judgment: Judgment normal.         Assessment/Plan:     Diagnosis and associated orders:     1. Tonsillitis  CULTURE THROAT    amoxicillin (AMOXIL) 500 MG Cap   2. Pharyngitis, unspecified etiology     3. Ingrown hair          Comments/MDM:       Patient is a 26-year-old female present with the stated above, unfortunately in clinical location we do not have any point-of-care strep testing available throat culture obtained I am concerned of bacterial etiology as she does have adenopathy, markedly edematous left tonsil no abscess present at this time she will be started on amoxicillin  Advised to continue supportive care with Tylenol and/or ibuprofen for fevers and discomfort. Increased fluids and electrolytes.   I personally reviewed prior external notes and prior test results pertinent to today's visit.   Discussed management options, risks and benefits, and alternatives to treatment plan agreed upon.   Red flags discussed and indications to immediately call 911 or present to the Emergency Department.   Supportive care, differential diagnoses, and indications for immediate follow-up discussed with patient.    Patient expresses understanding and agrees to plan. Patient denies any other questions or concerns.          Please note that this dictation was created using voice recognition software. I have made a reasonable attempt to correct obvious errors, but I expect that there are errors of grammar and possibly content that I did not discover before finalizing the note.    This note was electronically signed by Parveen SANCHEZ.

## 2022-02-09 ENCOUNTER — APPOINTMENT (OUTPATIENT)
Dept: RADIOLOGY | Facility: MEDICAL CENTER | Age: 27
End: 2022-02-09
Attending: EMERGENCY MEDICINE
Payer: OTHER GOVERNMENT

## 2022-02-09 ENCOUNTER — HOSPITAL ENCOUNTER (EMERGENCY)
Facility: MEDICAL CENTER | Age: 27
End: 2022-02-09
Attending: EMERGENCY MEDICINE
Payer: OTHER GOVERNMENT

## 2022-02-09 VITALS
DIASTOLIC BLOOD PRESSURE: 68 MMHG | BODY MASS INDEX: 23.81 KG/M2 | HEIGHT: 67 IN | WEIGHT: 151.68 LBS | HEART RATE: 97 BPM | TEMPERATURE: 97.5 F | OXYGEN SATURATION: 98 % | RESPIRATION RATE: 15 BRPM | SYSTOLIC BLOOD PRESSURE: 111 MMHG

## 2022-02-09 DIAGNOSIS — J36 PERITONSILLAR CELLULITIS: ICD-10-CM

## 2022-02-09 DIAGNOSIS — J03.90 TONSILLITIS: ICD-10-CM

## 2022-02-09 DIAGNOSIS — J02.9 SORE THROAT: ICD-10-CM

## 2022-02-09 LAB
ANION GAP SERPL CALC-SCNC: 11 MMOL/L (ref 7–16)
BASOPHILS # BLD AUTO: 0.6 % (ref 0–1.8)
BASOPHILS # BLD: 0.05 K/UL (ref 0–0.12)
BUN SERPL-MCNC: 8 MG/DL (ref 8–22)
CALCIUM SERPL-MCNC: 9.4 MG/DL (ref 8.5–10.5)
CHLORIDE SERPL-SCNC: 105 MMOL/L (ref 96–112)
CO2 SERPL-SCNC: 22 MMOL/L (ref 20–33)
CREAT SERPL-MCNC: 0.87 MG/DL (ref 0.5–1.4)
EOSINOPHIL # BLD AUTO: 0.03 K/UL (ref 0–0.51)
EOSINOPHIL NFR BLD: 0.4 % (ref 0–6.9)
ERYTHROCYTE [DISTWIDTH] IN BLOOD BY AUTOMATED COUNT: 41.6 FL (ref 35.9–50)
GLUCOSE SERPL-MCNC: 95 MG/DL (ref 65–99)
HCG SERPL QL: NEGATIVE
HCT VFR BLD AUTO: 44.1 % (ref 37–47)
HGB BLD-MCNC: 14.6 G/DL (ref 12–16)
IMM GRANULOCYTES # BLD AUTO: 0.04 K/UL (ref 0–0.11)
IMM GRANULOCYTES NFR BLD AUTO: 0.5 % (ref 0–0.9)
LYMPHOCYTES # BLD AUTO: 1.59 K/UL (ref 1–4.8)
LYMPHOCYTES NFR BLD: 19.7 % (ref 22–41)
MCH RBC QN AUTO: 29.3 PG (ref 27–33)
MCHC RBC AUTO-ENTMCNC: 33.1 G/DL (ref 33.6–35)
MCV RBC AUTO: 88.6 FL (ref 81.4–97.8)
MONOCYTES # BLD AUTO: 0.66 K/UL (ref 0–0.85)
MONOCYTES NFR BLD AUTO: 8.2 % (ref 0–13.4)
NEUTROPHILS # BLD AUTO: 5.7 K/UL (ref 2–7.15)
NEUTROPHILS NFR BLD: 70.6 % (ref 44–72)
NRBC # BLD AUTO: 0 K/UL
NRBC BLD-RTO: 0 /100 WBC
PLATELET # BLD AUTO: 244 K/UL (ref 164–446)
PMV BLD AUTO: 11.4 FL (ref 9–12.9)
POTASSIUM SERPL-SCNC: 4 MMOL/L (ref 3.6–5.5)
RBC # BLD AUTO: 4.98 M/UL (ref 4.2–5.4)
S PYO DNA SPEC NAA+PROBE: NOT DETECTED
SODIUM SERPL-SCNC: 138 MMOL/L (ref 135–145)
WBC # BLD AUTO: 8.1 K/UL (ref 4.8–10.8)

## 2022-02-09 PROCEDURE — 700117 HCHG RX CONTRAST REV CODE 255: Performed by: EMERGENCY MEDICINE

## 2022-02-09 PROCEDURE — 94760 N-INVAS EAR/PLS OXIMETRY 1: CPT

## 2022-02-09 PROCEDURE — 70491 CT SOFT TISSUE NECK W/DYE: CPT

## 2022-02-09 PROCEDURE — 84703 CHORIONIC GONADOTROPIN ASSAY: CPT

## 2022-02-09 PROCEDURE — 87651 STREP A DNA AMP PROBE: CPT

## 2022-02-09 PROCEDURE — 96375 TX/PRO/DX INJ NEW DRUG ADDON: CPT | Mod: XU

## 2022-02-09 PROCEDURE — 99284 EMERGENCY DEPT VISIT MOD MDM: CPT

## 2022-02-09 PROCEDURE — 96365 THER/PROPH/DIAG IV INF INIT: CPT

## 2022-02-09 PROCEDURE — 700111 HCHG RX REV CODE 636 W/ 250 OVERRIDE (IP): Performed by: EMERGENCY MEDICINE

## 2022-02-09 PROCEDURE — 85025 COMPLETE CBC W/AUTO DIFF WBC: CPT

## 2022-02-09 PROCEDURE — 700105 HCHG RX REV CODE 258: Performed by: EMERGENCY MEDICINE

## 2022-02-09 PROCEDURE — 80048 BASIC METABOLIC PNL TOTAL CA: CPT

## 2022-02-09 PROCEDURE — 36415 COLL VENOUS BLD VENIPUNCTURE: CPT

## 2022-02-09 RX ORDER — DIPHENHYDRAMINE HYDROCHLORIDE 50 MG/ML
25 INJECTION INTRAMUSCULAR; INTRAVENOUS ONCE
Status: COMPLETED | OUTPATIENT
Start: 2022-02-09 | End: 2022-02-09

## 2022-02-09 RX ORDER — HYDROMORPHONE HYDROCHLORIDE 1 MG/ML
0.5 INJECTION, SOLUTION INTRAMUSCULAR; INTRAVENOUS; SUBCUTANEOUS ONCE
Status: COMPLETED | OUTPATIENT
Start: 2022-02-09 | End: 2022-02-09

## 2022-02-09 RX ORDER — DEXAMETHASONE SODIUM PHOSPHATE 4 MG/ML
10 INJECTION, SOLUTION INTRA-ARTICULAR; INTRALESIONAL; INTRAMUSCULAR; INTRAVENOUS; SOFT TISSUE ONCE
Status: COMPLETED | OUTPATIENT
Start: 2022-02-09 | End: 2022-02-09

## 2022-02-09 RX ORDER — ONDANSETRON 2 MG/ML
4 INJECTION INTRAMUSCULAR; INTRAVENOUS ONCE
Status: COMPLETED | OUTPATIENT
Start: 2022-02-09 | End: 2022-02-09

## 2022-02-09 RX ORDER — AMOXICILLIN AND CLAVULANATE POTASSIUM 875; 125 MG/1; MG/1
1 TABLET, FILM COATED ORAL 3 TIMES DAILY
Qty: 30 TABLET | Refills: 0 | Status: SHIPPED | OUTPATIENT
Start: 2022-02-09 | End: 2022-02-19

## 2022-02-09 RX ORDER — SODIUM CHLORIDE 9 MG/ML
1000 INJECTION, SOLUTION INTRAVENOUS ONCE
Status: COMPLETED | OUTPATIENT
Start: 2022-02-09 | End: 2022-02-09

## 2022-02-09 RX ORDER — PREDNISONE 20 MG/1
60 TABLET ORAL DAILY
Qty: 12 TABLET | Refills: 0 | Status: SHIPPED | OUTPATIENT
Start: 2022-02-10 | End: 2022-02-14

## 2022-02-09 RX ADMIN — DEXAMETHASONE SODIUM PHOSPHATE 10 MG: 4 INJECTION, SOLUTION INTRA-ARTICULAR; INTRALESIONAL; INTRAMUSCULAR; INTRAVENOUS; SOFT TISSUE at 09:58

## 2022-02-09 RX ADMIN — ONDANSETRON 4 MG: 2 INJECTION INTRAMUSCULAR; INTRAVENOUS at 09:58

## 2022-02-09 RX ADMIN — SODIUM CHLORIDE 3 G: 900 INJECTION INTRAVENOUS at 09:58

## 2022-02-09 RX ADMIN — IOHEXOL 75 ML: 350 INJECTION, SOLUTION INTRAVENOUS at 11:30

## 2022-02-09 RX ADMIN — HYDROMORPHONE HYDROCHLORIDE 0.5 MG: 1 INJECTION, SOLUTION INTRAMUSCULAR; INTRAVENOUS; SUBCUTANEOUS at 10:16

## 2022-02-09 RX ADMIN — SODIUM CHLORIDE 1000 ML: 9 INJECTION, SOLUTION INTRAVENOUS at 09:58

## 2022-02-09 RX ADMIN — DIPHENHYDRAMINE HYDROCHLORIDE 25 MG: 50 INJECTION, SOLUTION INTRAMUSCULAR; INTRAVENOUS at 10:16

## 2022-02-09 ASSESSMENT — FIBROSIS 4 INDEX: FIB4 SCORE: 0.39

## 2022-02-09 NOTE — ED NOTES
Patient appears comfortable at this time and appears to be in less pain after medications. Vital signs are stable.

## 2022-02-09 NOTE — ED PROVIDER NOTES
ED Provider Note    Scribed for Kemar Fritz M.D. by Julian Garcia. 2/9/2022  9:10 AM    Primary care provider: ANGIE Mancuso  Means of arrival: Walk-in  History obtained from: Patient  History limited by: None    CHIEF COMPLAINT  Chief Complaint   Patient presents with   • Abscess     Pt has a abscess on the left side of her throat. Pt seen at  yesterday and discharged with antibiotics, however today feels as though the abscess is moving towards her uvula. Pt states she's been having chills and fevers at home.        HPI  Michel Angleo is a 26 y.o. female who presents to the Emergency Department for continued pain, swelling, and redness to her left throat. Patient states her symptoms started around 1 week ago and been progressively worsening. She was seen at  yesterday where she was placed on antibiotics for probable tonsillar abscess. Her symptoms worsened, prompting her to come to the ED today. She also has chills and fevers at home. Patient denies any headaches or vomiting.     REVIEW OF SYSTEMS  Pertinent positives include pain, swelling, and redness to left throat with fevers and chills.   Pertinent negatives include no headaches or vomiting.    All other systems reviewed and negative. See HPI for further details.       PAST MEDICAL HISTORY   has a past medical history of NEGATIVE HISTORY OF.    SURGICAL HISTORY   has a past surgical history that includes knee arthroscopy (Left, 05/2018); rhinoplasty (03/2019); knee arthroscopy (Right, 8/22/2019); meniscectomy, knee, medial (Right, 8/22/2019); and meniscal repair (Right, 8/22/2019).    SOCIAL HISTORY  Social History     Tobacco Use   • Smoking status: Never Smoker   • Smokeless tobacco: Never Used   Vaping Use   • Vaping Use: Never used   Substance Use Topics   • Alcohol use: Yes     Alcohol/week: 0.0 oz     Comment: once every couple of weeks   • Drug use: No      Social History     Substance and Sexual Activity   Drug Use No  "      FAMILY HISTORY  Family History   Problem Relation Age of Onset   • Hyperlipidemia Mother    • Hyperlipidemia Father    • Cancer Brother 14        adenocarcinoma- lung, carcinoid- appendix   • Genitourinary () Problems Other         cousin   • Diabetes Maternal Grandmother    • Breast Cancer Maternal Grandmother         postmenopausal       CURRENT MEDICATIONS  Home Medications     Reviewed by Daily Horner R.N. (Registered Nurse) on 02/09/22 at 0813  Med List Status: Partial   Medication Last Dose Status   amoxicillin (AMOXIL) 500 MG Cap  Active                ALLERGIES  Allergies   Allergen Reactions   • Gluten Meal        PHYSICAL EXAM  VITAL SIGNS: /74   Pulse (!) 110   Temp 36.8 °C (98.2 °F) (Temporal)   Resp 20   Ht 1.702 m (5' 7\")   Wt 68.8 kg (151 lb 10.8 oz)   SpO2 94%   BMI 23.76 kg/m²     Nursing note and vitals reviewed.  Constitutional: Well-developed and well-nourished. Mild distress secondary to pain.  HENT: Swelling of the left anterior tonsillar pillar without deviation of the uvula. Airway is patent. Head is normocephalic and atraumatic. Oropharynx is clear and moist without exudate.   Eyes: Pupils are equal, round, and reactive to light. Conjunctiva are normal.   Cardiovascular: Normal rate and regular rhythm. No murmur heard. Normal radial pulses.  Pulmonary/Chest: Breath sounds normal. No wheezes or rales.   Abdominal: Soft and non-tender. No distention    Musculoskeletal: Extremities exhibit normal range of motion without edema or tenderness.   Neurological: Awake, alert and oriented to person, place, and time. No focal deficits noted.  Skin: Skin is warm and dry. No rash.   Psychiatric: Normal mood and affect. Appropriate for clinical situation.    DIAGNOSTIC STUDIES / PROCEDURES    LABS  Results for orders placed or performed during the hospital encounter of 02/09/22   CBC WITH DIFFERENTIAL   Result Value Ref Range    WBC 8.1 4.8 - 10.8 K/uL    RBC 4.98 4.20 - 5.40 " M/uL    Hemoglobin 14.6 12.0 - 16.0 g/dL    Hematocrit 44.1 37.0 - 47.0 %    MCV 88.6 81.4 - 97.8 fL    MCH 29.3 27.0 - 33.0 pg    MCHC 33.1 (L) 33.6 - 35.0 g/dL    RDW 41.6 35.9 - 50.0 fL    Platelet Count 244 164 - 446 K/uL    MPV 11.4 9.0 - 12.9 fL    Neutrophils-Polys 70.60 44.00 - 72.00 %    Lymphocytes 19.70 (L) 22.00 - 41.00 %    Monocytes 8.20 0.00 - 13.40 %    Eosinophils 0.40 0.00 - 6.90 %    Basophils 0.60 0.00 - 1.80 %    Immature Granulocytes 0.50 0.00 - 0.90 %    Nucleated RBC 0.00 /100 WBC    Neutrophils (Absolute) 5.70 2.00 - 7.15 K/uL    Lymphs (Absolute) 1.59 1.00 - 4.80 K/uL    Monos (Absolute) 0.66 0.00 - 0.85 K/uL    Eos (Absolute) 0.03 0.00 - 0.51 K/uL    Baso (Absolute) 0.05 0.00 - 0.12 K/uL    Immature Granulocytes (abs) 0.04 0.00 - 0.11 K/uL    NRBC (Absolute) 0.00 K/uL   BASIC METABOLIC PANEL   Result Value Ref Range    Sodium 138 135 - 145 mmol/L    Potassium 4.0 3.6 - 5.5 mmol/L    Chloride 105 96 - 112 mmol/L    Co2 22 20 - 33 mmol/L    Glucose 95 65 - 99 mg/dL    Bun 8 8 - 22 mg/dL    Creatinine 0.87 0.50 - 1.40 mg/dL    Calcium 9.4 8.5 - 10.5 mg/dL    Anion Gap 11.0 7.0 - 16.0   BETA-HCG QUALITATIVE SERUM   Result Value Ref Range    Beta-Hcg Qualitative Serum Negative Negative   ESTIMATED GFR   Result Value Ref Range    GFR If African American >60 >60 mL/min/1.73 m 2    GFR If Non African American >60 >60 mL/min/1.73 m 2     All labs reviewed by me.    RADIOLOGY  CT-SOFT TISSUE NECK WITH   Final Result      1.  Moderate enlargement of the left tonsil concerning for acute tonsillitis. No evidence of drainable fluid collection.   2.  Likely reactive, enlarged left sided cervical lymph nodes. Recommend clinical follow-up to document resolution.        The radiologist's interpretation of all radiological studies have been reviewed by me.    COURSE & MEDICAL DECISION MAKING  Nursing notes, VS, PMSFHx reviewed in chart.     Review of past medical records shows the [facility] for sore throat and ingrown hair to the left groin; she was given a prescription for amoxicillin.     9:10 AM - Patient seen and examined at bedside. Patient will be treated with Decadron 10 mg, Unasyn 3g, Dilaudid 0.5 mg, Zofran 4 mg. Intravenous fluids were administered for NPO intolerance. Ordered CT-Soft tissue neck w/, Group A strep by PCR, CBC w/ diff, and BMP to evaluate her symptoms. The differential diagnoses include but are not limited to: Tonsillitis, pharyngitis, peritonsillar abscess.    9:56 AM - Patient treated with benadryl prophylactically for reported contrast allergy.     11:21 AM CT scan does not demonstrate any evidence of drainable fluid collection.  There is evidence of tonsillitis without evidence of peritonsillar abscess.  Patient is currently taking amoxicillin.  This will be discontinued.  I will start the patient on high-dose Augmentin 875 3 times daily for 10 days.  In addition she will be treated with prednisone.  She was given Decadron in the emergency department.  I will have her start prednisone at home tomorrow.    Primary care follow-up.    Strict return precautions discussed.    The patient will return for new or worsening symptoms and is stable at the time of discharge.    The patient is referred to a primary physician for blood pressure management, diabetic screening, and for all other preventative health concerns.    DISPOSITION:  Patient will be discharged home in stable condition.    FOLLOW UP:  Miguel Ángel Desai, MERVIN.P.R.N.  [address] [address] [phone]    Schedule an appointment as soon as possible for a visit       [facility], Emergency Dept  [address] [address] [phone]    If symptoms worsen      OUTPATIENT MEDICATIONS:  New Prescriptions    AMOXICILLIN-CLAVULANATE (AUGMENTIN) 875-125 MG TAB    Take 1 Tablet by mouth 3 times a day for 10 days.    HYDROCODONE-ACETAMINOPHEN 2.5-108 MG/5ML (HYCET)  7.5-325 MG/15ML SOLUTION    Take 13 mL by mouth 4 times a day as needed for Moderate Pain for up to 5 days.    PREDNISONE (DELTASONE) 20 MG TAB    Take 3 Tablets by mouth every day for 4 days.         FINAL IMPRESSION  1. Sore throat    2. Tonsillitis    3. Peritonsillar cellulitis          Julian GURROLA (Susan), am scribing for, and in the presence of, Kemar Fritz M.D..    Electronically signed by: Julian Garcia (Susan), 2/9/2022    IKemar M.D. personally performed the services described in this documentation, as scribed by Julian Garcia in my presence, and it is both accurate and complete.    The note accurately reflects work and decisions made by me.  Kemar Fritz M.D.  2/9/2022  11:22 AM

## 2022-02-09 NOTE — ED TRIAGE NOTES
"Chief Complaint   Patient presents with   • Abscess     Pt has a abscess on the left side of her throat. Pt seen at  yesterday and discharged with antibiotics, however today feels as though the abscess is moving towards her uvula. Pt states she's been having chills and fevers at home.      /74   Pulse (!) 110   Temp 36.8 °C (98.2 °F) (Temporal)   Resp 20   Ht 1.702 m (5' 7\")   Wt 68.8 kg (151 lb 10.8 oz)   SpO2 94%   BMI 23.76 kg/m²     Pt ambulatory to triage for above, pt speaking in full word sentences without difficulty.   "

## 2022-02-10 NOTE — DISCHARGE PLANNING
The pt called me and stated she tried to  her new prescriptions from [facility], but there was a problem with one of the rx's.    I called [facility] ([phone]) and spoke to the pharmacist, [place], and she stated the Augmentin was prescribed for TID which it too frequently. She stated it should normally be BID.    I then spoke with Dr. Greene who stated that BID for Augmentin would be fine for this pt.    I then spoke with Carrie again and told her Dr. Jaime Greene authorized changing the Augmentin to BID instead of TID.    I then called the pt, no answer, so I left a voicemail that she can  the rx tonight or tomorrow, and that the pharmacy closes at 9pm.

## 2022-03-14 ENCOUNTER — TELEPHONE (OUTPATIENT)
Dept: MEDICAL GROUP | Facility: IMAGING CENTER | Age: 27
End: 2022-03-14
Payer: OTHER MISCELLANEOUS

## 2022-03-15 ENCOUNTER — OFFICE VISIT (OUTPATIENT)
Dept: MEDICAL GROUP | Facility: IMAGING CENTER | Age: 27
End: 2022-03-15
Payer: OTHER GOVERNMENT

## 2022-03-15 VITALS
WEIGHT: 154 LBS | TEMPERATURE: 98.4 F | RESPIRATION RATE: 12 BRPM | BODY MASS INDEX: 24.17 KG/M2 | SYSTOLIC BLOOD PRESSURE: 106 MMHG | OXYGEN SATURATION: 97 % | DIASTOLIC BLOOD PRESSURE: 64 MMHG | HEART RATE: 88 BPM | HEIGHT: 67 IN

## 2022-03-15 DIAGNOSIS — R11.15 CYCLICAL VOMITING WITHOUT NAUSEA: ICD-10-CM

## 2022-03-15 DIAGNOSIS — R11.11 VOMITING WITHOUT NAUSEA, INTRACTABILITY OF VOMITING NOT SPECIFIED, UNSPECIFIED VOMITING TYPE: ICD-10-CM

## 2022-03-15 PROBLEM — R11.2 NAUSEA AND VOMITING: Status: ACTIVE | Noted: 2022-03-15

## 2022-03-15 PROCEDURE — 99214 OFFICE O/P EST MOD 30 MIN: CPT | Performed by: CLINICAL NURSE SPECIALIST

## 2022-03-15 RX ORDER — LORAZEPAM 1 MG/1
1 TABLET ORAL EVERY 4 HOURS PRN
Qty: 5 TABLET | Refills: 0 | Status: SHIPPED | OUTPATIENT
Start: 2022-03-15 | End: 2022-03-17

## 2022-03-15 ASSESSMENT — PAIN SCALES - GENERAL: PAINLEVEL: NO PAIN

## 2022-03-15 ASSESSMENT — FIBROSIS 4 INDEX: FIB4 SCORE: 0.46

## 2022-03-15 NOTE — PROGRESS NOTES
"Subjective     Michel Angelo is a 26 y.o. female who presents with Emesis (Several episodes of vomiting) and Gas (Increased bleching )            HPI  Vomiting without nausea  2.5 months ago started vomiting, intractable, not relieved by Zofran or Reglan in ER.  Belching started 2-3 weeks which is unusual for her. She vomited at work once and then yesterday vomited 3AM and 3PM without relief from Zofran.  Resolves spontaneously.  Different then gluten allergy.  No nausea prior.  Yesterday some food in vomit but then all yellow bile, no blood or black color.  No pain with the vomiting.  No concurrent diarrhea.  She still has dizziness. This has improved only slightly.  She had a MRI of the pituitary ordered but did not schedule due to claustrophobia.  Order .      ROS  See HPI      Allergies   Allergen Reactions   • Gluten Meal      No current outpatient medications on file prior to visit.     No current facility-administered medications on file prior to visit.           Objective     /64   Pulse 88   Temp 36.9 °C (98.4 °F)   Resp 12   Ht 1.702 m (5' 7\")   Wt 69.9 kg (154 lb)   LMP 2022 (Exact Date)   SpO2 97%   BMI 24.12 kg/m²      Physical Exam  Constitutional:       General: She is not in acute distress.     Appearance: Normal appearance. She is not ill-appearing, toxic-appearing or diaphoretic.   HENT:      Head: Normocephalic and atraumatic.   Eyes:      Pupils: Pupils are equal, round, and reactive to light.   Cardiovascular:      Rate and Rhythm: Normal rate.   Pulmonary:      Effort: Pulmonary effort is normal.   Abdominal:      General: Abdomen is flat. Bowel sounds are normal.      Palpations: Abdomen is soft. There is no hepatomegaly, splenomegaly or mass.      Tenderness: There is no abdominal tenderness. There is no guarding or rebound.   Skin:     General: Skin is warm and dry.   Neurological:      Mental Status: She is alert.      Gait: Gait normal.   Psychiatric:    "      Mood and Affect: Mood normal.         Behavior: Behavior normal.         Thought Content: Thought content normal.         Judgment: Judgment normal.                             Assessment & Plan        1. Vomiting without nausea, intractability of vomiting not specified, unspecified vomiting type  Cyclical vomiting possibly abdominal migraine although she does not have concurrent abdominal pain except after vomiting has begun and the pain is more muscular.  Unremarkable abdominal exam.      - LORazepam (ATIVAN) 1 MG Tab; Take 1 Tablet by mouth every four hours as needed (nausea and prior to MRI) for up to 5 doses.  Dispense: 5 Tablet; Refill: 0  - Referral to Gastroenterology  - MR-BRAIN PITUITARY W/WO; Future    2. Cyclical vomiting without nausea  New frequent belching with intermittent intractable vomiting without nausea. Starts early AM.  History of celiac disease and intermittent red face with alcohol consumption.  Differential abdominal migraine, enzyme deficiency, pituitary mass.      Reordered MRI brain as stat order.  She will take digestive enzymes for possible relief of belching.  If this does not work, she may try Hcl tablets OTC.  She was given Ativan for MRI sedation and she may try dissolving under her tongue if vomiting begins again.  PDMP reviewed.     Consider triptans if Ativan does not work and MRI negative.  Family history of migraines, consistent timing of vomiting and long periods without symptoms support this diagnosis.    Referral to GI placed.            Return if symptoms worsen or fail to improve, for With test results.

## 2022-03-15 NOTE — ASSESSMENT & PLAN NOTE
2.5 months ago started vomiting, intractable, not relieved by Zofran or Reglan in ER.  Belching started 2-3 weeks which is unusual for her. She vomited at work once and then yesterday vomited 3AM and 3PM without relief from Zofran.  Resolves spontaneously.  Different then gluten allergy.  No nausea prior.  Yesterday some food in vomit but then all yellow bile, no blood or black color.  No pain with the vomiting.  No concurrent diarrhea.  She still has dizziness. This has improved only slightly.  She had a MRI of the pituitary ordered but did not schedule due to claustrophobia.  Order .

## 2022-03-15 NOTE — TELEPHONE ENCOUNTER
Patient left message requesting call back.    Called and spoke with patient. Patient states she has been having episodes of random vomiting. Patient states she went to the ER last month and was given Zofran and Reglan but nothing helped. She is requesting to see Miguel Ángel soon.  Scheduled patient with Miguel Ángel tomorrow afternoon.

## 2022-03-16 ENCOUNTER — PATIENT MESSAGE (OUTPATIENT)
Dept: MEDICAL GROUP | Facility: IMAGING CENTER | Age: 27
End: 2022-03-16
Payer: OTHER MISCELLANEOUS

## 2022-03-16 DIAGNOSIS — R11.0 NAUSEA: ICD-10-CM

## 2022-03-18 ENCOUNTER — HOSPITAL ENCOUNTER (OUTPATIENT)
Dept: RADIOLOGY | Facility: MEDICAL CENTER | Age: 27
End: 2022-03-18
Attending: CLINICAL NURSE SPECIALIST
Payer: OTHER GOVERNMENT

## 2022-03-18 DIAGNOSIS — R11.11 VOMITING WITHOUT NAUSEA, INTRACTABILITY OF VOMITING NOT SPECIFIED, UNSPECIFIED VOMITING TYPE: ICD-10-CM

## 2022-03-18 PROCEDURE — 700117 HCHG RX CONTRAST REV CODE 255: Performed by: CLINICAL NURSE SPECIALIST

## 2022-03-18 PROCEDURE — A9576 INJ PROHANCE MULTIPACK: HCPCS | Performed by: CLINICAL NURSE SPECIALIST

## 2022-03-18 PROCEDURE — 70553 MRI BRAIN STEM W/O & W/DYE: CPT

## 2022-03-18 RX ADMIN — GADOTERIDOL 15 ML: 279.3 INJECTION, SOLUTION INTRAVENOUS at 13:43

## 2022-03-23 ENCOUNTER — EMPLOYEE HEALTH (OUTPATIENT)
Dept: OCCUPATIONAL MEDICINE | Facility: CLINIC | Age: 27
End: 2022-03-23

## 2022-03-23 ENCOUNTER — EH NON-PROVIDER (OUTPATIENT)
Dept: OCCUPATIONAL MEDICINE | Facility: CLINIC | Age: 27
End: 2022-03-23

## 2022-03-23 DIAGNOSIS — Z02.1 PRE-EMPLOYMENT HEALTH SCREENING EXAMINATION: ICD-10-CM

## 2022-03-23 DIAGNOSIS — Z02.1 PRE-EMPLOYMENT DRUG SCREENING: ICD-10-CM

## 2022-03-23 LAB
AMP AMPHETAMINE: NORMAL
BAR BARBITURATES: NORMAL
BZO BENZODIAZEPINES: NORMAL
COC COCAINE: NORMAL
INT CON NEG: NORMAL
INT CON POS: NORMAL
MDMA ECSTASY: NORMAL
MET METHAMPHETAMINES: NORMAL
MTD METHADONE: NORMAL
OPI OPIATES: NORMAL
OXY OXYCODONE: NORMAL
PCP PHENCYCLIDINE: NORMAL
POC URINE DRUG SCREEN OCDRS: NEGATIVE
THC: NORMAL

## 2022-03-23 PROCEDURE — 80305 DRUG TEST PRSMV DIR OPT OBS: CPT | Performed by: NURSE PRACTITIONER

## 2022-03-23 PROCEDURE — 8915 PR COMPREHENSIVE PHYSICAL: Performed by: NURSE PRACTITIONER

## 2022-03-23 RX ORDER — LORAZEPAM 1 MG/1
TABLET ORAL
COMMUNITY
Start: 2022-03-17 | End: 2022-07-05

## 2022-03-23 RX ORDER — ONDANSETRON 4 MG/1
4 TABLET, FILM COATED ORAL EVERY 4 HOURS PRN
Qty: 20 TABLET | Refills: 0 | Status: SHIPPED | OUTPATIENT
Start: 2022-03-23 | End: 2022-05-25

## 2022-05-25 ENCOUNTER — OFFICE VISIT (OUTPATIENT)
Dept: MEDICAL GROUP | Facility: IMAGING CENTER | Age: 27
End: 2022-05-25
Payer: OTHER GOVERNMENT

## 2022-05-25 VITALS
TEMPERATURE: 98.1 F | HEIGHT: 67 IN | HEART RATE: 82 BPM | DIASTOLIC BLOOD PRESSURE: 68 MMHG | OXYGEN SATURATION: 98 % | SYSTOLIC BLOOD PRESSURE: 102 MMHG | BODY MASS INDEX: 24.33 KG/M2 | WEIGHT: 155 LBS

## 2022-05-25 DIAGNOSIS — R07.9 CHEST PAIN, UNSPECIFIED TYPE: ICD-10-CM

## 2022-05-25 DIAGNOSIS — R11.11 VOMITING WITHOUT NAUSEA, INTRACTABILITY OF VOMITING NOT SPECIFIED, UNSPECIFIED VOMITING TYPE: ICD-10-CM

## 2022-05-25 DIAGNOSIS — F41.9 ANXIETY: ICD-10-CM

## 2022-05-25 PROBLEM — R30.0 DYSURIA: Status: RESOLVED | Noted: 2020-02-21 | Resolved: 2022-05-25

## 2022-05-25 PROBLEM — I95.9 HYPOTENSION: Status: RESOLVED | Noted: 2020-02-21 | Resolved: 2022-05-25

## 2022-05-25 PROCEDURE — 99214 OFFICE O/P EST MOD 30 MIN: CPT | Performed by: CLINICAL NURSE SPECIALIST

## 2022-05-25 ASSESSMENT — ANXIETY QUESTIONNAIRES
6. BECOMING EASILY ANNOYED OR IRRITABLE: NOT AT ALL
GAD7 TOTAL SCORE: 9
7. FEELING AFRAID AS IF SOMETHING AWFUL MIGHT HAPPEN: MORE THAN HALF THE DAYS
3. WORRYING TOO MUCH ABOUT DIFFERENT THINGS: MORE THAN HALF THE DAYS
1. FEELING NERVOUS, ANXIOUS, OR ON EDGE: SEVERAL DAYS
2. NOT BEING ABLE TO STOP OR CONTROL WORRYING: MORE THAN HALF THE DAYS
4. TROUBLE RELAXING: SEVERAL DAYS
5. BEING SO RESTLESS THAT IT IS HARD TO SIT STILL: SEVERAL DAYS

## 2022-05-25 ASSESSMENT — PAIN SCALES - GENERAL: PAINLEVEL: 5=MODERATE PAIN

## 2022-05-25 ASSESSMENT — PATIENT HEALTH QUESTIONNAIRE - PHQ9
5. POOR APPETITE OR OVEREATING: 0 - NOT AT ALL
CLINICAL INTERPRETATION OF PHQ2 SCORE: 1
SUM OF ALL RESPONSES TO PHQ QUESTIONS 1-9: 4

## 2022-05-25 ASSESSMENT — FIBROSIS 4 INDEX: FIB4 SCORE: 0.46

## 2022-05-25 NOTE — ASSESSMENT & PLAN NOTE
Ended relationship and symptoms improved.  Nausea still present but no more vomiting, worse around menses. She never took the ondansetron.  No changes in bowel movements.  No dietary changes, supplements.

## 2022-05-25 NOTE — ASSESSMENT & PLAN NOTE
Michel reports she is a chronically anxious person. No panic attacks recently. She believes a component of her vomiting was related to stress and anxiety.  No therapist or treatment currently.

## 2022-05-25 NOTE — PROGRESS NOTES
Subjective     Micehl Angelo is a 26 y.o. female who presents with Other (Chest tightness, x2weeks . Tightness goes up to throat, more noticeable at night  )            HPI  Vomiting without nausea  Ended relationship and symptoms improved.  Nausea still present but no more vomiting, worse around menses. She never took the ondansetron.  No changes in bowel movements.  No dietary changes, supplements.    Chest pain  New onset chest pain for 2 weeks with some difficulty breathing and shooting pains centrally.  She is also experiencing palpitations/racing especially at night. At work 2 days ago had chest pain with inability to take a deep breath, no issues with breathing currently.  Tried TUMS and did not help.  No recent panic attacks.  Emotionally improved since break up.  Still high stress.  Living alone now which is going well.  No family history of heart disease.  Denies radiation to the jaw, arm, back.     Anxiety  Michel reports she is a chronically anxious person. No panic attacks recently. She believes a component of her vomiting was related to stress and anxiety.  No therapist or treatment currently.      ROS  See HPI      Allergies   Allergen Reactions   • Contrast Allergy Premed Pack [Prednisone & Diphenhydramine] Shortness of Breath   • Gluten Meal      Current Outpatient Medications on File Prior to Visit   Medication Sig Dispense Refill   • LORazepam (ATIVAN) 1 MG Tab TAKE 1 TABLET BY MOUTH EVERY 4 HOURS AS NEEDED FOR NAUSEA AND PRIOR TO MRI FOR UP TO 5 DOSES       No current facility-administered medications on file prior to visit.     Depression Screening    Little interest or pleasure in doing things?  0 - not at all   Feeling down, depressed , or hopeless? 1 - several days   Trouble falling or staying asleep, or sleeping too much?  1 - several days   Feeling tired or having little energy?  0 - not at all   Poor appetite or overeating?  0 - not at all   Feeling bad about yourself - or that  "you are a failure or have let yourself or your family down? 1 - several days   Trouble concentrating on things, such as reading the newspaper or watching television? 1 - several days   Moving or speaking so slowly that other people could have noticed.  Or the opposite - being so fidgety or restless that you have been moving around a lot more than usual?  0 - not at all   Thoughts that you would be better off dead, or of hurting yourself?  0 - not at all   Patient Health Questionnaire Score: 4       If depressive symptoms identified deferred to follow up visit unless specifically addressed in assesment and plan.    Interpretation of PHQ-9 Total Score   Score Severity   1-4 No Depression   5-9 Mild Depression   10-14 Moderate Depression   15-19 Moderately Severe Depression   20-27 Severe Depression    MANSOOR-7 Questionnaire    Feeling nervous, anxious, or on edge: Several days  Not being able to sop or control worrying: More than half the days  Worrying too much about different things: More than half the days  Trouble relaxing: Several days  Being so restless that it's hard to sit still: Several days  Becoming easily annoyed or irritable: Not at all  Feeling afraid as if something awful might happen: More than half the days  Total: 9    Interpretation of MANSOOR 7 Total Score   Score Severity :  0-4 No Anxiety   5-9 Mild Anxiety  10-14 Moderate Anxiety  15-21 Severe Anxiety            Objective     /68 (BP Location: Left arm, Patient Position: Sitting, BP Cuff Size: Adult)   Pulse 82   Temp 36.7 °C (98.1 °F) (Temporal)   Ht 1.702 m (5' 7\")   Wt 70.3 kg (155 lb)   LMP 05/11/2022 (Within Days)   SpO2 98%   BMI 24.28 kg/m²      Physical Exam  Constitutional:       General: She is not in acute distress.     Appearance: Normal appearance. She is not ill-appearing, toxic-appearing or diaphoretic.   HENT:      Head: Normocephalic and atraumatic.   Eyes:      General: No scleral icterus.     Pupils: Pupils are equal, " round, and reactive to light.   Cardiovascular:      Rate and Rhythm: Normal rate and regular rhythm.      Heart sounds: No murmur heard.    Gallop (2 heard with one minute of auscultation) present.   Pulmonary:      Effort: Pulmonary effort is normal.      Breath sounds: Normal breath sounds. No wheezing, rhonchi or rales.   Musculoskeletal:      Comments: Tenderness over left chest wall with palpation   Skin:     General: Skin is warm and dry.   Neurological:      Mental Status: She is alert and oriented to person, place, and time.      Gait: Gait normal.   Psychiatric:         Mood and Affect: Mood normal.         Behavior: Behavior normal.         Thought Content: Thought content normal.         Judgment: Judgment normal.       EKG-sinus rhythm            Assessment & Plan       1. Vomiting without nausea, intractability of vomiting not specified, unspecified vomiting type  Improved. She never picked up Zofran. Consider acupuncture for continued nausea.  Therapy referral placed    2. Chest pain, unspecified type  Likely a combination of anxiety and costochondritis. Potentially also some acid reflux. Chest exam positive for tenderness over left sternal wall and 2 gallops likely PAC's or PVC's.  Trapezius muscles very tight.  VSS. EKG normal sinus rhythm.  I do not believe we need further cardiac workup at this time.       Trial omeprazole for 2 weeks.    Take ibuprofen for shooting pain if needed.    Take lorazepam 1/2 tab for symptoms related to anxiety.    If difficulty taking a deep breath occurs again, press on top of trapezius bilaterally then try again.     Return to care if symptoms do not improve or if they worsen.    - EKG - Clinic Performed    3. Anxiety  Referral to behavioral health placed.  Consider acupuncture.    - Referral to Behavioral Health    Return if symptoms worsen or fail to improve.

## 2022-05-25 NOTE — ASSESSMENT & PLAN NOTE
New onset chest pain for 2 weeks with some difficulty breathing and shooting pains centrally.  She is also experiencing palpitations/racing especially at night. At work 2 days ago had chest pain with inability to take a deep breath, no issues with breathing currently.  Tried TUMS and did not help.  No recent panic attacks.  Emotionally improved since break up.  Still high stress.  Living alone now which is going well.  No family history of heart disease.  Denies radiation to the jaw, arm, back.

## 2022-06-20 ENCOUNTER — HOSPITAL ENCOUNTER (EMERGENCY)
Facility: MEDICAL CENTER | Age: 27
End: 2022-06-20
Attending: EMERGENCY MEDICINE
Payer: COMMERCIAL

## 2022-06-20 VITALS
OXYGEN SATURATION: 98 % | SYSTOLIC BLOOD PRESSURE: 117 MMHG | TEMPERATURE: 98.6 F | DIASTOLIC BLOOD PRESSURE: 79 MMHG | HEART RATE: 79 BPM | RESPIRATION RATE: 16 BRPM | WEIGHT: 160 LBS

## 2022-06-20 DIAGNOSIS — Z77.21 EXPOSURE TO BODY FLUID: ICD-10-CM

## 2022-06-20 LAB
ALBUMIN SERPL BCP-MCNC: 4.9 G/DL (ref 3.2–4.9)
ALBUMIN/GLOB SERPL: 1.8 G/DL
ALP SERPL-CCNC: 51 U/L (ref 30–99)
ALT SERPL-CCNC: 19 U/L (ref 2–50)
ANION GAP SERPL CALC-SCNC: 13 MMOL/L (ref 7–16)
AST SERPL-CCNC: 22 U/L (ref 12–45)
BILIRUB SERPL-MCNC: 0.4 MG/DL (ref 0.1–1.5)
BUN SERPL-MCNC: 15 MG/DL (ref 8–22)
CALCIUM SERPL-MCNC: 9.5 MG/DL (ref 8.5–10.5)
CHLORIDE SERPL-SCNC: 104 MMOL/L (ref 96–112)
CO2 SERPL-SCNC: 22 MMOL/L (ref 20–33)
CREAT SERPL-MCNC: 0.86 MG/DL (ref 0.5–1.4)
ERYTHROCYTE [DISTWIDTH] IN BLOOD BY AUTOMATED COUNT: 39.4 FL (ref 35.9–50)
GFR SERPLBLD CREATININE-BSD FMLA CKD-EPI: 96 ML/MIN/1.73 M 2
GLOBULIN SER CALC-MCNC: 2.7 G/DL (ref 1.9–3.5)
GLUCOSE SERPL-MCNC: 86 MG/DL (ref 65–99)
HBV CORE AB SERPL QL IA: NONREACTIVE
HBV SURFACE AB SERPL IA-ACNC: <3.5 MIU/ML (ref 0–10)
HBV SURFACE AG SER QL: ABNORMAL
HCT VFR BLD AUTO: 43.1 % (ref 37–47)
HCV AB SER QL: ABNORMAL
HGB BLD-MCNC: 14.4 G/DL (ref 12–16)
HIV 1+2 AB+HIV1 P24 AG SERPL QL IA: ABNORMAL
MCH RBC QN AUTO: 28.8 PG (ref 27–33)
MCHC RBC AUTO-ENTMCNC: 33.4 G/DL (ref 33.6–35)
MCV RBC AUTO: 86.2 FL (ref 81.4–97.8)
PLATELET # BLD AUTO: 288 K/UL (ref 164–446)
PMV BLD AUTO: 11.4 FL (ref 9–12.9)
POTASSIUM SERPL-SCNC: 4.2 MMOL/L (ref 3.6–5.5)
PROT SERPL-MCNC: 7.6 G/DL (ref 6–8.2)
RBC # BLD AUTO: 5 M/UL (ref 4.2–5.4)
SODIUM SERPL-SCNC: 139 MMOL/L (ref 135–145)
WBC # BLD AUTO: 6.4 K/UL (ref 4.8–10.8)

## 2022-06-20 PROCEDURE — 87340 HEPATITIS B SURFACE AG IA: CPT

## 2022-06-20 PROCEDURE — 86704 HEP B CORE ANTIBODY TOTAL: CPT

## 2022-06-20 PROCEDURE — 85027 COMPLETE CBC AUTOMATED: CPT

## 2022-06-20 PROCEDURE — 99283 EMERGENCY DEPT VISIT LOW MDM: CPT

## 2022-06-20 PROCEDURE — 80053 COMPREHEN METABOLIC PANEL: CPT

## 2022-06-20 PROCEDURE — 86803 HEPATITIS C AB TEST: CPT

## 2022-06-20 PROCEDURE — 87389 HIV-1 AG W/HIV-1&-2 AB AG IA: CPT

## 2022-06-20 PROCEDURE — 86706 HEP B SURFACE ANTIBODY: CPT
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
## 2022-06-20 NOTE — ED NOTES
MD at bedside prior to d/c. Pt given d/c instruction and verbalized understanding. Pt ambulatory to lobby, gait steady. Pt took all belongings from room.
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
## 2022-07-03 ENCOUNTER — PATIENT MESSAGE (OUTPATIENT)
Dept: MEDICAL GROUP | Facility: IMAGING CENTER | Age: 27
End: 2022-07-03
Payer: COMMERCIAL

## 2022-07-03 DIAGNOSIS — R35.0 URINARY FREQUENCY: ICD-10-CM

## 2022-07-03 DIAGNOSIS — R10.9 BILATERAL FLANK PAIN: ICD-10-CM

## 2022-07-05 ENCOUNTER — HOSPITAL ENCOUNTER (EMERGENCY)
Facility: MEDICAL CENTER | Age: 27
End: 2022-07-05
Attending: EMERGENCY MEDICINE
Payer: COMMERCIAL

## 2022-07-05 VITALS
TEMPERATURE: 98.3 F | SYSTOLIC BLOOD PRESSURE: 100 MMHG | RESPIRATION RATE: 16 BRPM | OXYGEN SATURATION: 97 % | HEIGHT: 67 IN | DIASTOLIC BLOOD PRESSURE: 67 MMHG | HEART RATE: 71 BPM | BODY MASS INDEX: 24.78 KG/M2 | WEIGHT: 157.85 LBS

## 2022-07-05 DIAGNOSIS — R10.9 BILATERAL FLANK PAIN: ICD-10-CM

## 2022-07-05 DIAGNOSIS — R53.83 MALAISE AND FATIGUE: ICD-10-CM

## 2022-07-05 DIAGNOSIS — R11.0 NAUSEA: ICD-10-CM

## 2022-07-05 DIAGNOSIS — R53.81 MALAISE AND FATIGUE: ICD-10-CM

## 2022-07-05 LAB
APPEARANCE UR: CLEAR
BILIRUB UR QL STRIP.AUTO: NEGATIVE
COLOR UR: YELLOW
FLUAV RNA SPEC QL NAA+PROBE: NEGATIVE
FLUBV RNA SPEC QL NAA+PROBE: NEGATIVE
GLUCOSE UR STRIP.AUTO-MCNC: NEGATIVE MG/DL
HCG UR QL: NEGATIVE
KETONES UR STRIP.AUTO-MCNC: NEGATIVE MG/DL
LEUKOCYTE ESTERASE UR QL STRIP.AUTO: NEGATIVE
MICRO URNS: NORMAL
NITRITE UR QL STRIP.AUTO: NEGATIVE
PH UR STRIP.AUTO: 6.5 [PH] (ref 5–8)
PROT UR QL STRIP: NEGATIVE MG/DL
RBC UR QL AUTO: NEGATIVE
RSV RNA SPEC QL NAA+PROBE: NEGATIVE
SARS-COV-2 RNA RESP QL NAA+PROBE: NOTDETECTED
SP GR UR STRIP.AUTO: 1
SPECIMEN SOURCE: NORMAL
UROBILINOGEN UR STRIP.AUTO-MCNC: 0.2 MG/DL

## 2022-07-05 PROCEDURE — 99283 EMERGENCY DEPT VISIT LOW MDM: CPT

## 2022-07-05 PROCEDURE — 0241U HCHG SARS-COV-2 COVID-19 NFCT DS RESP RNA 4 TRGT MIC: CPT

## 2022-07-05 PROCEDURE — C9803 HOPD COVID-19 SPEC COLLECT: HCPCS | Performed by: EMERGENCY MEDICINE

## 2022-07-05 PROCEDURE — 36415 COLL VENOUS BLD VENIPUNCTURE: CPT

## 2022-07-05 PROCEDURE — 81003 URINALYSIS AUTO W/O SCOPE: CPT

## 2022-07-05 PROCEDURE — 81025 URINE PREGNANCY TEST: CPT

## 2022-07-05 RX ORDER — IBUPROFEN 200 MG
200 TABLET ORAL EVERY 6 HOURS PRN
Status: SHIPPED | COMMUNITY
End: 2022-08-26

## 2022-07-05 RX ORDER — ONDANSETRON 4 MG/1
4 TABLET, ORALLY DISINTEGRATING ORAL EVERY 4 HOURS PRN
Qty: 10 TABLET | Refills: 0 | Status: SHIPPED | OUTPATIENT
Start: 2022-07-05 | End: 2022-07-19 | Stop reason: SDUPTHER

## 2022-07-05 ASSESSMENT — FIBROSIS 4 INDEX: FIB4 SCORE: 0.46

## 2022-07-05 NOTE — Clinical Note
Seventythree Shoaib was seen and treated in our emergency department on 7/5/2022.  She may return to work on 07/07/2022.  Return 7/7 if afebrile and COVID-negative.  Return per Occupational Health recommendations if COVID-positive.     If you have any questions or concerns, please don't hesitate to call.      Maria Esther Garcia D.O.

## 2022-07-05 NOTE — ED TRIAGE NOTES
[facility]  26 [facility]  Chief Complaint   Patient presents with   • Flank Pain     Bilateral  Pain upon awakening this am, worsening since  Describes pain as 6/10 constant dull ache unrelieved with Ibuprofen taken at home  + urinary frequency, denies dysuria     Ambulatory to triage with steady gait for above. A & O x 4, GCS 15. Mask in place.    Triage process explained to patient, apologized for wait time, and returned to lobby.

## 2022-07-06 ENCOUNTER — PATIENT MESSAGE (OUTPATIENT)
Dept: MEDICAL GROUP | Facility: IMAGING CENTER | Age: 27
End: 2022-07-06
Payer: COMMERCIAL

## 2022-07-06 ENCOUNTER — TELEPHONE (OUTPATIENT)
Dept: MEDICAL GROUP | Facility: IMAGING CENTER | Age: 27
End: 2022-07-06
Payer: COMMERCIAL

## 2022-07-06 DIAGNOSIS — R10.9 ACUTE FLANK PAIN: ICD-10-CM

## 2022-07-06 RX ORDER — LEVOFLOXACIN 750 MG/1
750 TABLET, FILM COATED ORAL DAILY
Qty: 5 TABLET | Refills: 0 | Status: SHIPPED | OUTPATIENT
Start: 2022-07-06 | End: 2022-08-26

## 2022-07-06 NOTE — DISCHARGE INSTRUCTIONS
You can access your COVID-19/Influenza A test results through [employer] online later this evening.    If COVID-negative, you can return to work when feeling better and fever free without fever reducing medications for 24 hours.  If COVID-positive, refer to occupational health for return recommendations.      Follow-up with primary care this week for reevaluation.    Tylenol and ibuprofen, alternating if needed, as needed for fever or discomfort.  Zofran, oral dissolving tablet, every 4-6 hours as needed for nausea or vomiting.  Over-the-counter medications as needed for other cold and flu symptoms.    Encourage oral fluid hydration.  Diet and activity as tolerated.    Return to the emergency department for persistent or worsening back or flank pain, abdominal pain, intractable fever, intractable vomiting, altered mental status, syncope, difficulty breathing or other new concerns.

## 2022-07-06 NOTE — ED PROVIDER NOTES
ED Provider Note    CHIEF COMPLAINT  Chief Complaint   Patient presents with   • Flank Pain     Bilateral  Pain upon awakening this am, worsening since  Describes pain as 6/10 constant dull ache unrelieved with Ibuprofen taken at home  + urinary frequency, denies dysuria       HPI  Gerda rCamer is a 26 y.o. female who presents to the emergency department through triage for bilateral flank pain, aching, 2 out of 10 since awaking this morning.  No radiation to the abdomen, no abdominal pain.  Nausea, decreased appetite without vomiting.  No diarrhea.  Urinary frequency without dysuria, urgency or hematuria.  No diarrhea.  Fatigue and malaise.  No headache, congestion, cough or shortness of breath.  No fever or chills.  Denies history of similar symptoms.    REVIEW OF SYSTEMS  See HPI for further details. All other systems are negative.     PAST MEDICAL HISTORY   has a past medical history of Chronic constipation (4/8/2016), Dysmenorrhea (4/8/2016), Hypotension (2/21/2020), NEGATIVE HISTORY OF, and Neuritis- right scalp cutaneous x 5 days (9/22/2016).    SOCIAL HISTORY  Social History     Tobacco Use   • Smoking status: Never Smoker   • Smokeless tobacco: Never Used   Vaping Use   • Vaping Use: Never used   Substance and Sexual Activity   • Alcohol use: Yes     Comment: occasional   • Drug use: No   • Sexual activity: Yes     Partners: Female       SURGICAL HISTORY   has a past surgical history that includes knee arthroscopy (Left, 05/2018); rhinoplasty (03/2019); knee arthroscopy (Right, 8/22/2019); meniscectomy, knee, medial (Right, 8/22/2019); and meniscal repair (Right, 8/22/2019).    CURRENT MEDICATIONS  Home Medications     Reviewed by Veda Loving R.N. (Registered Nurse) on 07/05/22 at 1653  Med List Status: Partial   Medication Last Dose Status   ibuprofen (MOTRIN) 200 MG Tab  Active                ALLERGIES  Allergies   Allergen Reactions   • Contrast Allergy Premed Pack [Prednisone &  "Diphenhydramine] Shortness of Breath   • Gluten Meal        PHYSICAL EXAM  VITAL SIGNS: /67   Pulse 71   Temp 36.8 °C (98.3 °F)   Resp 16   Ht 1.702 m (5' 7\")   Wt 71.6 kg (157 lb 13.6 oz)   LMP 07/03/2022   SpO2 97%   BMI 24.72 kg/m²   Pulse ox interpretation: I interpret this pulse ox as normal.  Constitutional: Alert in no apparent distress.  HENT: Normocephalic, atraumatic. Bilateral external ears normal, Nose normal.   Eyes: Pupils are equal and reactive, Conjunctiva normal.   Neck: Normal range of motion, Supple.  No meningeal irritation.  Lymphatic: No lymphadenopathy noted.   Cardiovascular: Regular rate and rhythm, no murmurs. Distal pulses intact.  No peripheral edema.  Thorax & Lungs: Normal breath sounds.  No wheezing/rales/ronchi. No increased work of breathing, clipped speech or retractions.   Abdomen: Soft, non-distended, non-tender to palpation. No palpable or pulsatile masses. No peritoneal signs. No CVA tenderness.  Skin: Warm, Dry, No erythema, No rash.   Musculoskeletal: Good range of motion in all major joints.   Neurologic: Alert and orient x4.  Speech clear and cohesive.  Ambulates independently.  Psychiatric: Affect normal, Judgment normal, Mood normal.       DIAGNOSTIC STUDIES / PROCEDURES    LABS  Results for orders placed or performed during the hospital encounter of 07/05/22   URINALYSIS CULTURE, IF INDICATED    Specimen: Urine   Result Value Ref Range    Color Yellow     Character Clear     Specific Gravity 1.004 <1.035    Ph 6.5 5.0 - 8.0    Glucose Negative Negative mg/dL    Ketones Negative Negative mg/dL    Protein Negative Negative mg/dL    Bilirubin Negative Negative    Urobilinogen, Urine 0.2 Negative    Nitrite Negative Negative    Leukocyte Esterase Negative Negative    Occult Blood Negative Negative    Micro Urine Req see below    HCG QUALITATIVE UR   Result Value Ref Range    Beta-Hcg Urine Negative Negative   CoV-2, FLU A/B, and RSV by PCR (2-4 Hours CEPHEID) : " Collect NP swab in VTM    Specimen: Respirate   Result Value Ref Range    SARS-CoV-2 Source NP Swab        COURSE & MEDICAL DECISION MAKING  Nursing notes and vital signs were reviewed. (See chart for details)  The patients records were reviewed, history was obtained from the patient;     ED evaluation for bilateral flank pain and other vague symptoms is unrevealing, but most suggestive of viral like illness.  Urinalysis is unremarkable.  Symptomatology is atypical for ureteral colic/stone.  No clinical evidence for pyelonephritis or sepsis.  Abdominal exam is benign.  No clinical evidence for pneumonia.  Hemodynamically stable without fever, tachycardia, hypotension or hypoxia.  COVID/flu test pending (Cepheid ordered as patient is an employee and needs to know whether to return to work tomorrow, if feeling better).  She will follow these results at home this evening.  She is vaccinated for COVID and otherwise low risk for complication.    Patient is stable for discharge at this time, anticipatory guidance provided, Tylenol or ibuprofen for fever discomfort, Zofran for nausea or vomiting, close follow-up is encouraged, and strict ED return instructions have been detailed. Patient is agreeable to the disposition and plan.    FINAL IMPRESSION  (R10.9) Bilateral flank pain  (R53.81,  R53.83) Malaise and fatigue  (R11.0) Nausea      Electronically signed by: Maria Esther Garcia D.O., 7/5/2022 6:06 PM      This dictation was created using voice recognition software. The accuracy of the dictation is limited to the abilities of the software. I expect there may be some errors of grammar and possibly content. The nursing notes were reviewed and certain aspects of this information were incorporated into this note.

## 2022-07-06 NOTE — PROGRESS NOTES
Michel returned call and reports chills, no fever currently, worsening flank pain and urinary frequency without increase in water intake. She has some brief discomfort in her flanks when she initiates micturation.  In ED recently and urine sample without signs of infection. No imaging completed.  On phone today, she checked for CVA tenderness and mildly tender with percussion.  Ultrasound renal ordered to check for nephroliths. As she is having worsening chills, discomfort with urination in renal area and urinary frequency, will treat empirically for presumptive pyelonephritis.      1. Acute flank pain    - US-RENAL; Future  - levoFLOXacin (LEVAQUIN) 750 MG tablet; Take 1 Tablet by mouth every day.  Dispense: 5 Tablet; Refill: 0

## 2022-07-07 ENCOUNTER — HOSPITAL ENCOUNTER (OUTPATIENT)
Dept: LAB | Facility: MEDICAL CENTER | Age: 27
End: 2022-07-07
Attending: CLINICAL NURSE SPECIALIST
Payer: COMMERCIAL

## 2022-07-07 DIAGNOSIS — R10.9 BILATERAL FLANK PAIN: ICD-10-CM

## 2022-07-07 DIAGNOSIS — R35.0 URINARY FREQUENCY: ICD-10-CM

## 2022-07-07 LAB
ALBUMIN SERPL BCP-MCNC: 4.9 G/DL (ref 3.2–4.9)
ALBUMIN/GLOB SERPL: 1.9 G/DL
ALP SERPL-CCNC: 45 U/L (ref 30–99)
ALT SERPL-CCNC: 15 U/L (ref 2–50)
ANION GAP SERPL CALC-SCNC: 9 MMOL/L (ref 7–16)
AST SERPL-CCNC: 18 U/L (ref 12–45)
BASOPHILS # BLD AUTO: 0.4 % (ref 0–1.8)
BASOPHILS # BLD: 0.02 K/UL (ref 0–0.12)
BILIRUB SERPL-MCNC: 0.4 MG/DL (ref 0.1–1.5)
BUN SERPL-MCNC: 17 MG/DL (ref 8–22)
CALCIUM SERPL-MCNC: 9.6 MG/DL (ref 8.5–10.5)
CHLORIDE SERPL-SCNC: 106 MMOL/L (ref 96–112)
CK SERPL-CCNC: 88 U/L (ref 0–154)
CO2 SERPL-SCNC: 26 MMOL/L (ref 20–33)
CREAT SERPL-MCNC: 0.8 MG/DL (ref 0.5–1.4)
CRP SERPL HS-MCNC: <0.3 MG/DL (ref 0–0.75)
EOSINOPHIL # BLD AUTO: 0.05 K/UL (ref 0–0.51)
EOSINOPHIL NFR BLD: 0.9 % (ref 0–6.9)
ERYTHROCYTE [DISTWIDTH] IN BLOOD BY AUTOMATED COUNT: 40.7 FL (ref 35.9–50)
ERYTHROCYTE [SEDIMENTATION RATE] IN BLOOD BY WESTERGREN METHOD: 5 MM/HOUR (ref 0–25)
GFR SERPLBLD CREATININE-BSD FMLA CKD-EPI: 105 ML/MIN/1.73 M 2
GLOBULIN SER CALC-MCNC: 2.6 G/DL (ref 1.9–3.5)
GLUCOSE SERPL-MCNC: 70 MG/DL (ref 65–99)
HCT VFR BLD AUTO: 43.4 % (ref 37–47)
HGB BLD-MCNC: 14.3 G/DL (ref 12–16)
IMM GRANULOCYTES # BLD AUTO: 0.02 K/UL (ref 0–0.11)
IMM GRANULOCYTES NFR BLD AUTO: 0.4 % (ref 0–0.9)
LYMPHOCYTES # BLD AUTO: 2.33 K/UL (ref 1–4.8)
LYMPHOCYTES NFR BLD: 41.7 % (ref 22–41)
MCH RBC QN AUTO: 28.8 PG (ref 27–33)
MCHC RBC AUTO-ENTMCNC: 32.9 G/DL (ref 33.6–35)
MCV RBC AUTO: 87.3 FL (ref 81.4–97.8)
MONOCYTES # BLD AUTO: 0.29 K/UL (ref 0–0.85)
MONOCYTES NFR BLD AUTO: 5.2 % (ref 0–13.4)
NEUTROPHILS # BLD AUTO: 2.88 K/UL (ref 2–7.15)
NEUTROPHILS NFR BLD: 51.4 % (ref 44–72)
NRBC # BLD AUTO: 0 K/UL
NRBC BLD-RTO: 0 /100 WBC
PLATELET # BLD AUTO: 253 K/UL (ref 164–446)
PMV BLD AUTO: 12.5 FL (ref 9–12.9)
POTASSIUM SERPL-SCNC: 4.4 MMOL/L (ref 3.6–5.5)
PROT SERPL-MCNC: 7.5 G/DL (ref 6–8.2)
RBC # BLD AUTO: 4.97 M/UL (ref 4.2–5.4)
SODIUM SERPL-SCNC: 141 MMOL/L (ref 135–145)
WBC # BLD AUTO: 5.6 K/UL (ref 4.8–10.8)

## 2022-07-07 PROCEDURE — 86140 C-REACTIVE PROTEIN: CPT

## 2022-07-07 PROCEDURE — 80053 COMPREHEN METABOLIC PANEL: CPT

## 2022-07-07 PROCEDURE — 82550 ASSAY OF CK (CPK): CPT

## 2022-07-07 PROCEDURE — 36415 COLL VENOUS BLD VENIPUNCTURE: CPT

## 2022-07-07 PROCEDURE — 85025 COMPLETE CBC W/AUTO DIFF WBC: CPT

## 2022-07-07 PROCEDURE — 85652 RBC SED RATE AUTOMATED: CPT

## 2022-07-08 ENCOUNTER — HOSPITAL ENCOUNTER (OUTPATIENT)
Dept: RADIOLOGY | Facility: MEDICAL CENTER | Age: 27
End: 2022-07-08
Attending: CLINICAL NURSE SPECIALIST
Payer: COMMERCIAL

## 2022-07-08 ENCOUNTER — TELEPHONE (OUTPATIENT)
Dept: MEDICAL GROUP | Facility: IMAGING CENTER | Age: 27
End: 2022-07-08
Payer: COMMERCIAL

## 2022-07-08 DIAGNOSIS — R10.9 ACUTE FLANK PAIN: ICD-10-CM

## 2022-07-08 DIAGNOSIS — R10.31 RLQ ABDOMINAL PAIN: ICD-10-CM

## 2022-07-08 DIAGNOSIS — R10.32 LLQ PAIN: ICD-10-CM

## 2022-07-08 PROCEDURE — 76775 US EXAM ABDO BACK WALL LIM: CPT

## 2022-07-08 NOTE — TELEPHONE ENCOUNTER
VOICEMAIL  1. Caller Name: Michel Angelo                      Call Back Number: [phone] (home)     2. Message: Pt called to say she is scheduled for her US of the Renal today, but the pain is increasing and she now feels it in her right lower quadrant of her abdomen.  Pt is concerned could be appendicitis and would like to ask if PCP could also order the US of the abdomen to check.  Pt would like to get both done as she is currently in the waiting room for the Renal US.  Please advise.

## 2022-07-11 ENCOUNTER — HOSPITAL ENCOUNTER (OUTPATIENT)
Dept: LAB | Facility: MEDICAL CENTER | Age: 27
End: 2022-07-11
Attending: CLINICAL NURSE SPECIALIST
Payer: COMMERCIAL

## 2022-07-11 ENCOUNTER — APPOINTMENT (OUTPATIENT)
Dept: MEDICAL GROUP | Facility: IMAGING CENTER | Age: 27
End: 2022-07-11
Payer: COMMERCIAL

## 2022-07-11 ENCOUNTER — OFFICE VISIT (OUTPATIENT)
Dept: MEDICAL GROUP | Facility: IMAGING CENTER | Age: 27
End: 2022-07-11

## 2022-07-11 ENCOUNTER — HOSPITAL ENCOUNTER (OUTPATIENT)
Dept: RADIOLOGY | Facility: MEDICAL CENTER | Age: 27
End: 2022-07-11
Attending: CLINICAL NURSE SPECIALIST
Payer: COMMERCIAL

## 2022-07-11 VITALS
DIASTOLIC BLOOD PRESSURE: 68 MMHG | OXYGEN SATURATION: 98 % | HEIGHT: 67 IN | BODY MASS INDEX: 24.39 KG/M2 | SYSTOLIC BLOOD PRESSURE: 104 MMHG | HEART RATE: 88 BPM | WEIGHT: 155.4 LBS | TEMPERATURE: 97.9 F

## 2022-07-11 DIAGNOSIS — R10.2 PELVIC PAIN: ICD-10-CM

## 2022-07-11 DIAGNOSIS — N93.9 ABNORMAL UTERINE BLEEDING: ICD-10-CM

## 2022-07-11 LAB
BASOPHILS # BLD AUTO: 0.5 % (ref 0–1.8)
BASOPHILS # BLD: 0.03 K/UL (ref 0–0.12)
EOSINOPHIL # BLD AUTO: 0.06 K/UL (ref 0–0.51)
EOSINOPHIL NFR BLD: 1 % (ref 0–6.9)
ERYTHROCYTE [DISTWIDTH] IN BLOOD BY AUTOMATED COUNT: 38.9 FL (ref 35.9–50)
FERRITIN SERPL-MCNC: 62.3 NG/ML (ref 10–291)
HCT VFR BLD AUTO: 43.7 % (ref 37–47)
HGB BLD-MCNC: 14.9 G/DL (ref 12–16)
IMM GRANULOCYTES # BLD AUTO: 0.02 K/UL (ref 0–0.11)
IMM GRANULOCYTES NFR BLD AUTO: 0.3 % (ref 0–0.9)
IRON SATN MFR SERPL: 41 % (ref 15–55)
IRON SERPL-MCNC: 124 UG/DL (ref 40–170)
LYMPHOCYTES # BLD AUTO: 2.9 K/UL (ref 1–4.8)
LYMPHOCYTES NFR BLD: 48.4 % (ref 22–41)
MCH RBC QN AUTO: 29.3 PG (ref 27–33)
MCHC RBC AUTO-ENTMCNC: 34.1 G/DL (ref 33.6–35)
MCV RBC AUTO: 85.9 FL (ref 81.4–97.8)
MONOCYTES # BLD AUTO: 0.39 K/UL (ref 0–0.85)
MONOCYTES NFR BLD AUTO: 6.5 % (ref 0–13.4)
NEUTROPHILS # BLD AUTO: 2.59 K/UL (ref 2–7.15)
NEUTROPHILS NFR BLD: 43.3 % (ref 44–72)
NRBC # BLD AUTO: 0 K/UL
NRBC BLD-RTO: 0 /100 WBC
PLATELET # BLD AUTO: 261 K/UL (ref 164–446)
PMV BLD AUTO: 12 FL (ref 9–12.9)
RBC # BLD AUTO: 5.09 M/UL (ref 4.2–5.4)
TIBC SERPL-MCNC: 302 UG/DL (ref 250–450)
UIBC SERPL-MCNC: 178 UG/DL (ref 110–370)
WBC # BLD AUTO: 6 K/UL (ref 4.8–10.8)

## 2022-07-11 PROCEDURE — 83540 ASSAY OF IRON: CPT

## 2022-07-11 PROCEDURE — 76830 TRANSVAGINAL US NON-OB: CPT

## 2022-07-11 PROCEDURE — 85025 COMPLETE CBC W/AUTO DIFF WBC: CPT

## 2022-07-11 PROCEDURE — 83550 IRON BINDING TEST: CPT

## 2022-07-11 PROCEDURE — 82728 ASSAY OF FERRITIN: CPT

## 2022-07-11 PROCEDURE — 99214 OFFICE O/P EST MOD 30 MIN: CPT | Performed by: CLINICAL NURSE SPECIALIST

## 2022-07-11 PROCEDURE — 36415 COLL VENOUS BLD VENIPUNCTURE: CPT

## 2022-07-11 ASSESSMENT — FIBROSIS 4 INDEX: FIB4 SCORE: 0.44

## 2022-07-11 ASSESSMENT — PAIN SCALES - GENERAL: PAINLEVEL: NO PAIN

## 2022-07-11 NOTE — PROGRESS NOTES
"Subjective     Michel Angelo is a 26 y.o. female who presents with GI Problem (Lower abdominal pain been going on for a few days ) and Fatigue            HPI  Michel has been having flank pain, vomiting and chills for a week.  Energy is very low.  She went to the ER and tests negative.  Flank pain now radiating to RLQ.  Chills have resolved but fatigue has worsened, sensation of feeling heavy.  Period started 7/5 and heavier than usual.  Usually bleeds for 3 days, but bled for 4 days heavily then stopped and bled again for a day. Family history of endometriosis. Ultrasound renal and appendix normal.  After ultrasound pain in RLQ persisted and had a pale BM.  Ache in RLQ woke her at 3AM.  Feels bloated in RLQ.  Intermittent dizziness.  She is drinking sufficiently and eating.      ROS  No lightheadedness, cold sensation, current bleeding.     Allergies   Allergen Reactions   • Contrast Allergy Premed Pack [Prednisone & Diphenhydramine] Shortness of Breath   • Gluten Meal      Current Outpatient Medications on File Prior to Visit   Medication Sig Dispense Refill   • ibuprofen (MOTRIN) 200 MG Tab Take 200 mg by mouth every 6 hours as needed.     • levoFLOXacin (LEVAQUIN) 750 MG tablet Take 1 Tablet by mouth every day. (Patient not taking: Reported on 7/11/2022) 5 Tablet 0   • ondansetron (ZOFRAN ODT) 4 MG TABLET DISPERSIBLE Take 1 Tablet by mouth every four hours as needed (nausea and/or vomiting). (Patient not taking: Reported on 7/11/2022) 10 Tablet 0     No current facility-administered medications on file prior to visit.              Objective     /68 (BP Location: Left arm, Patient Position: Sitting, BP Cuff Size: Adult)   Pulse 88   Temp 36.6 °C (97.9 °F) (Temporal)   Ht 1.702 m (5' 7\")   Wt 70.5 kg (155 lb 6.4 oz)   LMP 07/03/2022   SpO2 98%   BMI 24.34 kg/m²      Physical Exam  Constitutional:       General: She is not in acute distress.     Appearance: Normal appearance. She is not " ill-appearing, toxic-appearing or diaphoretic.   HENT:      Head: Normocephalic and atraumatic.   Eyes:      Pupils: Pupils are equal, round, and reactive to light.   Cardiovascular:      Rate and Rhythm: Normal rate and regular rhythm.   Pulmonary:      Effort: Pulmonary effort is normal.      Breath sounds: Normal breath sounds.   Abdominal:      Tenderness: There is abdominal tenderness (RLQ pain with palpation). There is no right CVA tenderness or left CVA tenderness.   Skin:     General: Skin is warm and dry.   Neurological:      Mental Status: She is alert and oriented to person, place, and time.      Gait: Gait normal.   Psychiatric:         Mood and Affect: Mood normal.         Behavior: Behavior normal.         Thought Content: Thought content normal.         Judgment: Judgment normal.             Hospital Outpatient Visit on 07/07/2022   Component Date Value   • Stat C-Reactive Protein 07/07/2022 <0.30    • CPK Total 07/07/2022 88    • Sed Rate Westergren 07/07/2022 5    • Sodium 07/07/2022 141    • Potassium 07/07/2022 4.4    • Chloride 07/07/2022 106    • Co2 07/07/2022 26    • Anion Gap 07/07/2022 9.0    • Glucose 07/07/2022 70    • Bun 07/07/2022 17    • Creatinine 07/07/2022 0.80    • Calcium 07/07/2022 9.6    • AST(SGOT) 07/07/2022 18    • ALT(SGPT) 07/07/2022 15    • Alkaline Phosphatase 07/07/2022 45    • Total Bilirubin 07/07/2022 0.4    • Albumin 07/07/2022 4.9    • Total Protein 07/07/2022 7.5    • Globulin 07/07/2022 2.6    • A-G Ratio 07/07/2022 1.9    • WBC 07/07/2022 5.6    • RBC 07/07/2022 4.97    • Hemoglobin 07/07/2022 14.3    • Hematocrit 07/07/2022 43.4    • MCV 07/07/2022 87.3    • MCH 07/07/2022 28.8    • MCHC 07/07/2022 32.9 (A)   • RDW 07/07/2022 40.7    • Platelet Count 07/07/2022 253    • MPV 07/07/2022 12.5    • Neutrophils-Polys 07/07/2022 51.40    • Lymphocytes 07/07/2022 41.70 (A)   • Monocytes 07/07/2022 5.20    • Eosinophils 07/07/2022 0.90    • Basophils 07/07/2022 0.40     • Immature Granulocytes 07/07/2022 0.40    • Nucleated RBC 07/07/2022 0.00    • Neutrophils (Absolute) 07/07/2022 2.88    • Lymphs (Absolute) 07/07/2022 2.33    • Monos (Absolute) 07/07/2022 0.29    • Eos (Absolute) 07/07/2022 0.05    • Baso (Absolute) 07/07/2022 0.02    • Immature Granulocytes (a* 07/07/2022 0.02    • NRBC (Absolute) 07/07/2022 0.00    • GFR (CKD-EPI) 07/07/2022 105    Admission on 07/05/2022, Discharged on 07/05/2022   Component Date Value   • Color 07/05/2022 Yellow    • Character 07/05/2022 Clear    • Specific Gravity 07/05/2022 1.004    • Ph 07/05/2022 6.5    • Glucose 07/05/2022 Negative    • Ketones 07/05/2022 Negative    • Protein 07/05/2022 Negative    • Bilirubin 07/05/2022 Negative    • Urobilinogen, Urine 07/05/2022 0.2    • Nitrite 07/05/2022 Negative    • Leukocyte Esterase 07/05/2022 Negative    • Occult Blood 07/05/2022 Negative    • Micro Urine Req 07/05/2022 see below    • Beta-Hcg Urine 07/05/2022 Negative    • Influenza virus A RNA 07/05/2022 Negative    • Influenza virus B, PCR 07/05/2022 Negative    • RSV, PCR 07/05/2022 Negative    • SARS-CoV-2 by PCR 07/05/2022 NotDetected    • SARS-CoV-2 Source 07/05/2022 NP Swab                     Assessment & Plan        1. Pelvic pain  Recent ultrasound appendix negative. Pain now in LLQ severe with palpation.  Leading differential is ovarian cyst with possible rupture.  Severe fatigue and abnormal uterine bleeding concerning for acute blood loss. Labs ordered.     Labs following visit resulted and normal.    - Referral to Gynecology  - US-ABDOMEN COMPLETE SURVEY; Future  - CBC WITH DIFFERENTIAL; Future  - FERRITIN; Future  - IRON/TOTAL IRON BIND; Future  - US-PELVIC COMPLETE (TRANSABDOMINAL/TRANSVAGINAL) (COMBO); Future    2. Abnormal uterine bleeding  See #1 for plan. If she starts feeling very cold, lightheaded or uterine bleeding resumes, go to the ED.    - CBC WITH DIFFERENTIAL; Future  - FERRITIN; Future  - IRON/TOTAL IRON BIND;  Future       Return if symptoms worsen or fail to improve, for With test results.

## 2022-07-11 NOTE — LETTER
[facility]  [place] [id number] [place] [place] [address]     July 11, 2022    Patient: Michel Angelo   YOB: 1995   Date of Visit: 7/11/2022       To Whom It May Concern:    Michel Angelo was seen and treated in our department on 7/11/2022. She was unable to attend work 7/10/22 and 7/11/22 due to a medical condition.      Sincerely,         MARGARET Mancuso.

## 2022-07-12 ENCOUNTER — PATIENT MESSAGE (OUTPATIENT)
Dept: MEDICAL GROUP | Facility: IMAGING CENTER | Age: 27
End: 2022-07-12

## 2022-07-12 ENCOUNTER — HOSPITAL ENCOUNTER (OUTPATIENT)
Dept: RADIOLOGY | Facility: MEDICAL CENTER | Age: 27
End: 2022-07-12
Attending: CLINICAL NURSE SPECIALIST
Payer: COMMERCIAL

## 2022-07-12 DIAGNOSIS — R10.2 PELVIC PAIN: ICD-10-CM

## 2022-07-12 DIAGNOSIS — R10.33 PERIUMBILICAL ABDOMINAL PAIN: ICD-10-CM

## 2022-07-12 PROCEDURE — 76700 US EXAM ABDOM COMPLETE: CPT

## 2022-07-15 DIAGNOSIS — R10.84 GENERALIZED ABDOMINAL PAIN: ICD-10-CM

## 2022-07-19 DIAGNOSIS — R11.0 NAUSEA: ICD-10-CM

## 2022-07-19 RX ORDER — ONDANSETRON 4 MG/1
4 TABLET, ORALLY DISINTEGRATING ORAL EVERY 4 HOURS PRN
Qty: 30 TABLET | Refills: 0 | Status: SHIPPED | OUTPATIENT
Start: 2022-07-19 | End: 2022-08-26

## 2022-07-20 DIAGNOSIS — N89.8 WHITE VAGINAL DISCHARGE: ICD-10-CM

## 2022-07-20 RX ORDER — FLUCONAZOLE 150 MG/1
150 TABLET ORAL ONCE
Qty: 1 TABLET | Refills: 0 | Status: SHIPPED | OUTPATIENT
Start: 2022-07-20 | End: 2022-07-20

## 2022-08-19 ENCOUNTER — OFFICE VISIT (OUTPATIENT)
Dept: MEDICAL GROUP | Facility: IMAGING CENTER | Age: 27
End: 2022-08-19
Payer: COMMERCIAL

## 2022-08-19 ENCOUNTER — HOSPITAL ENCOUNTER (OUTPATIENT)
Facility: MEDICAL CENTER | Age: 27
End: 2022-08-19
Attending: CLINICAL NURSE SPECIALIST
Payer: COMMERCIAL

## 2022-08-19 VITALS
DIASTOLIC BLOOD PRESSURE: 60 MMHG | HEART RATE: 87 BPM | HEIGHT: 67 IN | TEMPERATURE: 98 F | SYSTOLIC BLOOD PRESSURE: 110 MMHG | WEIGHT: 157 LBS | BODY MASS INDEX: 24.64 KG/M2 | OXYGEN SATURATION: 99 %

## 2022-08-19 DIAGNOSIS — N30.00 ACUTE CYSTITIS WITHOUT HEMATURIA: ICD-10-CM

## 2022-08-19 DIAGNOSIS — R10.2 PELVIC PAIN: ICD-10-CM

## 2022-08-19 DIAGNOSIS — K64.9 HEMORRHOIDS, UNSPECIFIED HEMORRHOID TYPE: ICD-10-CM

## 2022-08-19 DIAGNOSIS — R30.0 DYSURIA: ICD-10-CM

## 2022-08-19 DIAGNOSIS — N89.8 VAGINAL ITCHING: ICD-10-CM

## 2022-08-19 DIAGNOSIS — R11.0 NAUSEA: ICD-10-CM

## 2022-08-19 LAB
APPEARANCE UR: CLEAR
BILIRUB UR STRIP-MCNC: NEGATIVE MG/DL
CANDIDA DNA VAG QL PROBE+SIG AMP: NEGATIVE
COLOR UR AUTO: YELLOW
G VAGINALIS DNA VAG QL PROBE+SIG AMP: NEGATIVE
GLUCOSE UR STRIP.AUTO-MCNC: NEGATIVE MG/DL
KETONES UR STRIP.AUTO-MCNC: NEGATIVE MG/DL
LEUKOCYTE ESTERASE UR QL STRIP.AUTO: NEGATIVE
NITRITE UR QL STRIP.AUTO: NEGATIVE
PH UR STRIP.AUTO: 7 [PH] (ref 5–8)
PROT UR QL STRIP: NEGATIVE MG/DL
RBC UR QL AUTO: NEGATIVE
SP GR UR STRIP.AUTO: 1.02
T VAGINALIS DNA VAG QL PROBE+SIG AMP: NEGATIVE
UROBILINOGEN UR STRIP-MCNC: 0.2 MG/DL

## 2022-08-19 PROCEDURE — 87086 URINE CULTURE/COLONY COUNT: CPT

## 2022-08-19 PROCEDURE — 87660 TRICHOMONAS VAGIN DIR PROBE: CPT

## 2022-08-19 PROCEDURE — 87480 CANDIDA DNA DIR PROBE: CPT

## 2022-08-19 PROCEDURE — 87510 GARDNER VAG DNA DIR PROBE: CPT

## 2022-08-19 PROCEDURE — 87077 CULTURE AEROBIC IDENTIFY: CPT

## 2022-08-19 PROCEDURE — 99214 OFFICE O/P EST MOD 30 MIN: CPT | Performed by: CLINICAL NURSE SPECIALIST

## 2022-08-19 PROCEDURE — 81002 URINALYSIS NONAUTO W/O SCOPE: CPT | Performed by: CLINICAL NURSE SPECIALIST

## 2022-08-19 RX ORDER — FLUCONAZOLE 150 MG/1
TABLET ORAL
COMMUNITY
Start: 2022-07-20 | End: 2022-08-26

## 2022-08-19 ASSESSMENT — PAIN SCALES - GENERAL: PAINLEVEL: 4=SLIGHT-MODERATE PAIN

## 2022-08-19 ASSESSMENT — FIBROSIS 4 INDEX: FIB4 SCORE: 0.43

## 2022-08-19 NOTE — PROGRESS NOTES
"Subjective     Michel Angelo is a 26 y.o. female who presents with Vaginal Discharge (White discharge, itching, burning x1week )            HPI  Vaginal itching in labia started 4-5 days ago.  Burning with urination.  No frequency or hematuria.  Recent white vaginal discharge, not with clots, no abnormal smell.      Hemorrhoids itching for a month. Tried Tucks, prep H which helps briefly.  Warm baths help slightly.      Nausea continues without recent vomiting. RLQ pelvic pain continues as well.  GI appointment next month.    ROS  See HPI     Allergies   Allergen Reactions    Contrast Allergy Premed Pack [Prednisone & Diphenhydramine] Shortness of Breath    Gluten Meal      Current Outpatient Medications on File Prior to Visit   Medication Sig Dispense Refill    ondansetron (ZOFRAN ODT) 4 MG TABLET DISPERSIBLE Take 1 Tablet by mouth every four hours as needed (nausea and/or vomiting). 30 Tablet 0    ibuprofen (MOTRIN) 200 MG Tab Take 200 mg by mouth every 6 hours as needed.      fluconazole (DIFLUCAN) 150 MG tablet TAKE 1 TABLET BY MOUTH 1 TIME FOR 1 DOSE (Patient not taking: Reported on 8/19/2022)      levoFLOXacin (LEVAQUIN) 750 MG tablet Take 1 Tablet by mouth every day. (Patient not taking: No sig reported) 5 Tablet 0     No current facility-administered medications on file prior to visit.           Objective     /60 (BP Location: Left arm, Patient Position: Sitting, BP Cuff Size: Adult)   Pulse 87   Temp 36.7 °C (98 °F) (Temporal)   Ht 1.702 m (5' 7\")   Wt 71.2 kg (157 lb)   LMP 07/29/2022 (Exact Date)   SpO2 99%   BMI 24.59 kg/m²      Physical Exam  Constitutional:       General: She is not in acute distress.     Appearance: Normal appearance. She is not ill-appearing, toxic-appearing or diaphoretic.   HENT:      Head: Normocephalic and atraumatic.   Eyes:      Pupils: Pupils are equal, round, and reactive to light.   Cardiovascular:      Rate and Rhythm: Normal rate.   Pulmonary:      " Effort: Pulmonary effort is normal.   Abdominal:      General: Abdomen is flat. Bowel sounds are normal.      Palpations: Abdomen is soft.      Tenderness: There is no abdominal tenderness. There is no right CVA tenderness or left CVA tenderness.   Skin:     General: Skin is warm and dry.   Neurological:      Mental Status: She is alert and oriented to person, place, and time.      Gait: Gait normal.   Psychiatric:         Mood and Affect: Mood normal.         Behavior: Behavior normal.         Thought Content: Thought content normal.         Judgment: Judgment normal.                 Assessment & Plan     1. Dysuria  Michel's urinalysis was negative.  I believe that her discomfort and itching in her vulvar area are related to Candida.  Vaginal pathogens obtained as well and sent.  She will take 1 dose of fluconazole.  If this does not relieve the issue, she will report back.  She will be informed of results when they occur.    - POCT Urinalysis-NEGATIVE  - URINE CULTURE(NEW); Future    2. Vaginal itching  See 1  - VAGINAL PATHOGENS DNA PANEL; Future    3. Pelvic pain  Bowen continues to have lower right quadrant pain.  She has an appointment with GI and gynecology coming up.  Acupuncture was also suggested and referral was placed.    - Referral for Acupuncture    4. Nausea  She continues with nausea but has not been vomiting lately.  She will consider acupuncture for this as well.  She also has an appointment with gastroenterology set up.  - Referral for Acupuncture    5. Hemorrhoids, unspecified hemorrhoid type  She complains of recent flare of hemorrhoids that are very itchy.  Over-the-counter medication provides brief relief.  She will discuss with acupuncture and talk to them about the herbal formula [facility].  She also has an appointment set up with GI.    Return if symptoms worsen or fail to improve, for With test results.

## 2022-08-21 LAB
BACTERIA UR CULT: ABNORMAL
BACTERIA UR CULT: ABNORMAL
SIGNIFICANT IND 70042: ABNORMAL
SITE SITE: ABNORMAL
SOURCE SOURCE: ABNORMAL

## 2022-08-22 ENCOUNTER — TELEPHONE (OUTPATIENT)
Dept: MEDICAL GROUP | Facility: IMAGING CENTER | Age: 27
End: 2022-08-22
Payer: COMMERCIAL

## 2022-08-22 RX ORDER — AMOXICILLIN 500 MG/1
500 CAPSULE ORAL 3 TIMES DAILY
Qty: 15 CAPSULE | Refills: 0 | Status: SHIPPED | OUTPATIENT
Start: 2022-08-22 | End: 2022-08-27

## 2022-08-22 NOTE — TELEPHONE ENCOUNTER
Spoke with Mulu about positive GBS and negative candida, BV and trich. She will go get her amoxicillin. She has had some increased flank pain.  She will start the antibiotics immediately but it she develops a fever or if the flank pain does not improve, she is to notify me immediately.

## 2022-08-26 ENCOUNTER — HOSPITAL ENCOUNTER (EMERGENCY)
Facility: MEDICAL CENTER | Age: 27
End: 2022-08-26
Attending: EMERGENCY MEDICINE
Payer: COMMERCIAL

## 2022-08-26 ENCOUNTER — APPOINTMENT (OUTPATIENT)
Dept: RADIOLOGY | Facility: MEDICAL CENTER | Age: 27
End: 2022-08-26
Attending: EMERGENCY MEDICINE
Payer: COMMERCIAL

## 2022-08-26 VITALS
DIASTOLIC BLOOD PRESSURE: 99 MMHG | OXYGEN SATURATION: 98 % | WEIGHT: 156.97 LBS | TEMPERATURE: 97.8 F | RESPIRATION RATE: 22 BRPM | HEIGHT: 67 IN | BODY MASS INDEX: 24.64 KG/M2 | HEART RATE: 94 BPM | SYSTOLIC BLOOD PRESSURE: 139 MMHG

## 2022-08-26 DIAGNOSIS — R10.9 FLANK PAIN: ICD-10-CM

## 2022-08-26 LAB
ALBUMIN SERPL BCP-MCNC: 4.4 G/DL (ref 3.2–4.9)
ALBUMIN/GLOB SERPL: 1.7 G/DL
ALP SERPL-CCNC: 42 U/L (ref 30–99)
ALT SERPL-CCNC: 14 U/L (ref 2–50)
ANION GAP SERPL CALC-SCNC: 10 MMOL/L (ref 7–16)
APPEARANCE UR: CLEAR
AST SERPL-CCNC: 18 U/L (ref 12–45)
BACTERIA GENITAL QL WET PREP: NORMAL
BASOPHILS # BLD AUTO: 0.8 % (ref 0–1.8)
BASOPHILS # BLD: 0.05 K/UL (ref 0–0.12)
BILIRUB SERPL-MCNC: 0.4 MG/DL (ref 0.1–1.5)
BILIRUB UR QL STRIP.AUTO: NEGATIVE
BUN SERPL-MCNC: 13 MG/DL (ref 8–22)
CALCIUM SERPL-MCNC: 9 MG/DL (ref 8.4–10.2)
CHLORIDE SERPL-SCNC: 109 MMOL/L (ref 96–112)
CO2 SERPL-SCNC: 21 MMOL/L (ref 20–33)
COLOR UR: YELLOW
CREAT SERPL-MCNC: 0.7 MG/DL (ref 0.5–1.4)
EOSINOPHIL # BLD AUTO: 0.06 K/UL (ref 0–0.51)
EOSINOPHIL NFR BLD: 0.9 % (ref 0–6.9)
ERYTHROCYTE [DISTWIDTH] IN BLOOD BY AUTOMATED COUNT: 38.3 FL (ref 35.9–50)
GFR SERPLBLD CREATININE-BSD FMLA CKD-EPI: 123 ML/MIN/1.73 M 2
GLOBULIN SER CALC-MCNC: 2.6 G/DL (ref 1.9–3.5)
GLUCOSE SERPL-MCNC: 83 MG/DL (ref 65–99)
GLUCOSE UR STRIP.AUTO-MCNC: NEGATIVE MG/DL
HCG SERPL QL: NEGATIVE
HCT VFR BLD AUTO: 41.6 % (ref 37–47)
HGB BLD-MCNC: 14.2 G/DL (ref 12–16)
IMM GRANULOCYTES # BLD AUTO: 0.02 K/UL (ref 0–0.11)
IMM GRANULOCYTES NFR BLD AUTO: 0.3 % (ref 0–0.9)
KETONES UR STRIP.AUTO-MCNC: NEGATIVE MG/DL
LEUKOCYTE ESTERASE UR QL STRIP.AUTO: NEGATIVE
LIPASE SERPL-CCNC: 24 U/L (ref 7–58)
LYMPHOCYTES # BLD AUTO: 2.79 K/UL (ref 1–4.8)
LYMPHOCYTES NFR BLD: 43.3 % (ref 22–41)
MCH RBC QN AUTO: 29.5 PG (ref 27–33)
MCHC RBC AUTO-ENTMCNC: 34.1 G/DL (ref 33.6–35)
MCV RBC AUTO: 86.3 FL (ref 81.4–97.8)
MICRO URNS: NORMAL
MONOCYTES # BLD AUTO: 0.42 K/UL (ref 0–0.85)
MONOCYTES NFR BLD AUTO: 6.5 % (ref 0–13.4)
NEUTROPHILS # BLD AUTO: 3.11 K/UL (ref 2–7.15)
NEUTROPHILS NFR BLD: 48.2 % (ref 44–72)
NITRITE UR QL STRIP.AUTO: NEGATIVE
NRBC # BLD AUTO: 0 K/UL
NRBC BLD-RTO: 0 /100 WBC
PH UR STRIP.AUTO: 7 [PH] (ref 5–8)
PLATELET # BLD AUTO: 242 K/UL (ref 164–446)
PMV BLD AUTO: 11.8 FL (ref 9–12.9)
POTASSIUM SERPL-SCNC: 3.9 MMOL/L (ref 3.6–5.5)
PROT SERPL-MCNC: 7 G/DL (ref 6–8.2)
PROT UR QL STRIP: NEGATIVE MG/DL
RBC # BLD AUTO: 4.82 M/UL (ref 4.2–5.4)
RBC UR QL AUTO: NEGATIVE
SIGNIFICANT IND 70042: NORMAL
SITE SITE: NORMAL
SODIUM SERPL-SCNC: 140 MMOL/L (ref 135–145)
SOURCE SOURCE: NORMAL
SP GR UR STRIP.AUTO: 1.01
WBC # BLD AUTO: 6.5 K/UL (ref 4.8–10.8)

## 2022-08-26 PROCEDURE — 99285 EMERGENCY DEPT VISIT HI MDM: CPT

## 2022-08-26 PROCEDURE — 87591 N.GONORRHOEAE DNA AMP PROB: CPT

## 2022-08-26 PROCEDURE — 81003 URINALYSIS AUTO W/O SCOPE: CPT

## 2022-08-26 PROCEDURE — 96374 THER/PROPH/DIAG INJ IV PUSH: CPT

## 2022-08-26 PROCEDURE — 85025 COMPLETE CBC W/AUTO DIFF WBC: CPT

## 2022-08-26 PROCEDURE — 74177 CT ABD & PELVIS W/CONTRAST: CPT

## 2022-08-26 PROCEDURE — 36415 COLL VENOUS BLD VENIPUNCTURE: CPT

## 2022-08-26 PROCEDURE — 87491 CHLMYD TRACH DNA AMP PROBE: CPT

## 2022-08-26 PROCEDURE — 700111 HCHG RX REV CODE 636 W/ 250 OVERRIDE (IP): Performed by: EMERGENCY MEDICINE

## 2022-08-26 PROCEDURE — 96375 TX/PRO/DX INJ NEW DRUG ADDON: CPT

## 2022-08-26 PROCEDURE — 84703 CHORIONIC GONADOTROPIN ASSAY: CPT

## 2022-08-26 PROCEDURE — 700117 HCHG RX CONTRAST REV CODE 255: Performed by: EMERGENCY MEDICINE

## 2022-08-26 PROCEDURE — 80053 COMPREHEN METABOLIC PANEL: CPT

## 2022-08-26 PROCEDURE — 83690 ASSAY OF LIPASE: CPT

## 2022-08-26 RX ORDER — CHOLECALCIFEROL (VITAMIN D3) 125 MCG
10 CAPSULE ORAL
Status: SHIPPED | COMMUNITY
End: 2023-05-04

## 2022-08-26 RX ORDER — DIPHENHYDRAMINE HYDROCHLORIDE 50 MG/ML
25 INJECTION INTRAMUSCULAR; INTRAVENOUS ONCE
Status: COMPLETED | OUTPATIENT
Start: 2022-08-26 | End: 2022-08-26

## 2022-08-26 RX ORDER — ACETAMINOPHEN 325 MG/1
650 TABLET ORAL EVERY 4 HOURS PRN
Status: SHIPPED | COMMUNITY
End: 2023-04-05

## 2022-08-26 RX ORDER — ONDANSETRON 2 MG/ML
4 INJECTION INTRAMUSCULAR; INTRAVENOUS ONCE
Status: COMPLETED | OUTPATIENT
Start: 2022-08-26 | End: 2022-08-26

## 2022-08-26 RX ADMIN — DIPHENHYDRAMINE HYDROCHLORIDE 25 MG: 50 INJECTION INTRAMUSCULAR; INTRAVENOUS at 16:44

## 2022-08-26 RX ADMIN — FENTANYL CITRATE 50 MCG: 50 INJECTION, SOLUTION INTRAMUSCULAR; INTRAVENOUS at 16:07

## 2022-08-26 RX ADMIN — ONDANSETRON 4 MG: 2 INJECTION INTRAMUSCULAR; INTRAVENOUS at 16:07

## 2022-08-26 RX ADMIN — IOHEXOL 80 ML: 350 INJECTION, SOLUTION INTRAVENOUS at 17:01

## 2022-08-26 ASSESSMENT — PAIN DESCRIPTION - PAIN TYPE: TYPE: ACUTE PAIN

## 2022-08-26 ASSESSMENT — FIBROSIS 4 INDEX: FIB4 SCORE: 0.43

## 2022-08-26 NOTE — ED NOTES
Pharmacy Medication Reconciliation    ~Med rec updated and complete per patient at bedside  ~Allergies have been verified and updated  ~Pt home pharmacy: [facility]      ~Patient reports that she is on a course of Amoxil that was started on 08/22/2022 and last dose is due tomorrow 08/27/2022

## 2022-08-26 NOTE — ED PROVIDER NOTES
ED Provider Note    CHIEF COMPLAINT  Chief Complaint   Patient presents with    Flank Pain     Rt sided x 6-7 wks  Recently Dx with UTI ( strep B )and placed on amoxicillin  1 more day of ABX and no improvement  Pain persists and nauseated and dizzy  No recent fever       HPI  Michel Angelo is a 26 y.o. female who presents to emergency room with chronic right-sided flank pain. Past medical history as documented below. Patient is part of our ER care team. No notable work-related injury.    Was seen by another provider approximately one month ago due to similar pain. At the time urinalysis was completed and negative. In the interim further outpatient care and workup showed possible strep be urinary tract infection and she is now finished with amoxicillin with no change. She is also had renal ultrasound as well as transvaginal ultrasound both of which were also negative. At this point there is no clear etiology as to why she has her chronic pain. She has an appointment with gastroenterology for upcoming colonoscopy for further evaluation there. Her half-brother did have history of adenocarcinoma. Mother with history of CLL.    Last menstrual period was approximately one month ago on time and regular. No current vaginally or discharge. Has had re-had recent outpatient vaginally swallows which are also negative today. Currently her pain continues to be moderate to severe. Seems to be worse with pressure on her right flank. Specifically she notes hugs.     REVIEW OF SYSTEMS  See HPI for further details. All other systems are negative.     PAST MEDICAL HISTORY   has a past medical history of Chronic constipation (4/8/2016), Dysmenorrhea (4/8/2016), Hypotension (2/21/2020), NEGATIVE HISTORY OF, and Neuritis- right scalp cutaneous x 5 days (9/22/2016).    SOCIAL HISTORY  Social History     Tobacco Use    Smoking status: Never    Smokeless tobacco: Never   Vaping Use    Vaping Use: Never used   Substance and Sexual  "Activity    Alcohol use: Yes     Comment: occasional    Drug use: No    Sexual activity: Yes     Partners: Female       SURGICAL HISTORY   has a past surgical history that includes knee arthroscopy (Left, 05/2018); rhinoplasty (03/2019); knee arthroscopy (Right, 8/22/2019); meniscectomy, knee, medial (Right, 8/22/2019); and meniscal repair (Right, 8/22/2019).    CURRENT MEDICATIONS  Home Medications       Reviewed by Regino Tracey (Pharmacy Tech) on 08/26/22 at 1621  Med List Status: Complete     Medication Last Dose Status   acetaminophen (TYLENOL) 325 MG Tab 8/25/2022 Active   amoxicillin (AMOXIL) 500 MG Cap 8/26/2022 Active   Homeopathic Products (ARNICA EX) LAST WEEK Active   melatonin 5 mg Tab 8/24/2022 Active   Multiple Vitamin (MULTIVITAMIN PO) 8/25/2022 Active                    ALLERGIES  Allergies   Allergen Reactions    Gluten Meal Diarrhea and Vomiting     Diarrhea      Iodine Contrast [Diagnostic X-Ray Materials] Shortness of Breath     Tight chest       PHYSICAL EXAM  VITAL SIGNS: BP (!) 139/99   Pulse 94   Temp 36.6 °C (97.8 °F) (Temporal)   Resp (!) 22   Ht 1.702 m (5' 7\")   Wt 71.2 kg (156 lb 15.5 oz)   LMP 07/29/2022 (Exact Date)   SpO2 98%   BMI 24.58 kg/m²  @EM[[id number]::@   Pulse ox interpretation: I interpret this pulse ox as normal.  Constitutional: Alert in no apparent distress.  HENT: No signs of trauma, Bilateral external ears normal, Nose normal.   Eyes: Pupils are equal and reactive  Neck: Normal range of motion, No tenderness, Supple  Cardiovascular: Regular rate and rhythm, no murmurs.   Thorax & Lungs: Normal breath sounds, No respiratory distress, No wheezing, No chest tenderness.   Abdomen: Bowel sounds normal, Soft, No tenderness  Skin: Warm, Dry, No erythema, No rash.   Back: No bony tenderness, right CVA tenderness.   Extremities: Intact distal pulses  Musculoskeletal: Good range of motion in all major joints. No tenderness to palpation or major deformities noted. "   Neurologic: Alert , Normal motor function, Normal sensory function, No focal deficits noted.   Psychiatric: Affect normal, Judgment normal, Mood normal.       DIAGNOSTIC STUDIES / PROCEDURES      LABS  Results for orders placed or performed during the hospital encounter of 08/26/22   URINALYSIS    Specimen: Urine, Clean Catch   Result Value Ref Range    Color Yellow     Character Clear     Specific Gravity 1.010 <1.035    Ph 7.0 5.0 - 8.0    Glucose Negative Negative mg/dL    Ketones Negative Negative mg/dL    Protein Negative Negative mg/dL    Bilirubin Negative Negative    Nitrite Negative Negative    Leukocyte Esterase Negative Negative    Occult Blood Negative Negative    Micro Urine Req see below    CBC WITH DIFFERENTIAL   Result Value Ref Range    WBC 6.5 4.8 - 10.8 K/uL    RBC 4.82 4.20 - 5.40 M/uL    Hemoglobin 14.2 12.0 - 16.0 g/dL    Hematocrit 41.6 37.0 - 47.0 %    MCV 86.3 81.4 - 97.8 fL    MCH 29.5 27.0 - 33.0 pg    MCHC 34.1 33.6 - 35.0 g/dL    RDW 38.3 35.9 - 50.0 fL    Platelet Count 242 164 - 446 K/uL    MPV 11.8 9.0 - 12.9 fL    Neutrophils-Polys 48.20 44.00 - 72.00 %    Lymphocytes 43.30 (H) 22.00 - 41.00 %    Monocytes 6.50 0.00 - 13.40 %    Eosinophils 0.90 0.00 - 6.90 %    Basophils 0.80 0.00 - 1.80 %    Immature Granulocytes 0.30 0.00 - 0.90 %    Nucleated RBC 0.00 /100 WBC    Neutrophils (Absolute) 3.11 2.00 - 7.15 K/uL    Lymphs (Absolute) 2.79 1.00 - 4.80 K/uL    Monos (Absolute) 0.42 0.00 - 0.85 K/uL    Eos (Absolute) 0.06 0.00 - 0.51 K/uL    Baso (Absolute) 0.05 0.00 - 0.12 K/uL    Immature Granulocytes (abs) 0.02 0.00 - 0.11 K/uL    NRBC (Absolute) 0.00 K/uL   COMP METABOLIC PANEL   Result Value Ref Range    Sodium 140 135 - 145 mmol/L    Potassium 3.9 3.6 - 5.5 mmol/L    Chloride 109 96 - 112 mmol/L    Co2 21 20 - 33 mmol/L    Anion Gap 10.0 7.0 - 16.0    Glucose 83 65 - 99 mg/dL    Bun 13 8 - 22 mg/dL    Creatinine 0.70 0.50 - 1.40 mg/dL    Calcium 9.0 8.4 - 10.2 mg/dL    AST(SGOT)  18 12 - 45 U/L    ALT(SGPT) 14 2 - 50 U/L    Alkaline Phosphatase 42 30 - 99 U/L    Total Bilirubin 0.4 0.1 - 1.5 mg/dL    Albumin 4.4 3.2 - 4.9 g/dL    Total Protein 7.0 6.0 - 8.2 g/dL    Globulin 2.6 1.9 - 3.5 g/dL    A-G Ratio 1.7 g/dL   LIPASE   Result Value Ref Range    Lipase 24 7 - 58 U/L   WET PREP    Specimen: Vaginal; Genital   Result Value Ref Range    Significant Indicator NEG     Source GEN     Site VAGINAL     Wet Prep For Parasites       Few WBCs seen.  No yeast.  No motile Trichomonas seen.  No clue cells seen.     HCG QUAL SERUM   Result Value Ref Range    Beta-Hcg Qualitative Serum Negative Negative   ESTIMATED GFR   Result Value Ref Range    GFR (CKD-EPI) 123 >60 mL/min/1.73 m 2   Chlamydia/GC, PCR (Genital/Anal swab)   Result Value Ref Range    Source Genital          RADIOLOGY  CT-ABDOMEN-PELVIS WITH   Final Result      1.  There is a small amount of free fluid in the pelvis which can be within normal limits.      2.  Exam is otherwise within normal limits. There is no evidence of hydronephrosis.            COURSE & MEDICAL DECISION MAKING  Pertinent Labs & Imaging studies reviewed. (See chart for details)  26-year-old presented the emergency department with flank pain. History as above. Fortunately workup is benign with additional CT imaging is completed tonight. This is following significant outpatient workup as well. Given the recurrent negative clinical findings and having reviewed the CT imaging tonight which does show quite significant stool burden it may be more of constipation/gas pain causing some of her discomfort. Furthermore musculoskeletal etiologies have also been discussed and considered. At this point she will continue with the bowel regimen and outpatient follow-up with gastroenterology as scheduled. She is understanding return precautions here the ER if needed.      The patient will return for worsening symptoms and is stable at the time of discharge. The patient verbalizes  understanding and will comply.    FINAL IMPRESSION  1. Flank pain            Electronically signed by: Italo Benson M.D., 8/26/2022 3:35 PM

## 2022-08-27 LAB
C TRACH DNA GENITAL QL NAA+PROBE: NEGATIVE
N GONORRHOEA DNA GENITAL QL NAA+PROBE: NEGATIVE
SPECIMEN SOURCE: NORMAL

## 2022-08-27 NOTE — ED NOTES
Pt discharged to mom. Pt educated to follow up with APRN and MD; and to seek medical attention if s/s worsen.

## 2022-08-28 ENCOUNTER — APPOINTMENT (OUTPATIENT)
Dept: RADIOLOGY | Facility: MEDICAL CENTER | Age: 27
End: 2022-08-28
Attending: EMERGENCY MEDICINE
Payer: COMMERCIAL

## 2022-08-28 ENCOUNTER — HOSPITAL ENCOUNTER (EMERGENCY)
Facility: MEDICAL CENTER | Age: 27
End: 2022-08-28
Attending: EMERGENCY MEDICINE
Payer: COMMERCIAL

## 2022-08-28 VITALS
BODY MASS INDEX: 25.11 KG/M2 | SYSTOLIC BLOOD PRESSURE: 100 MMHG | RESPIRATION RATE: 14 BRPM | HEART RATE: 90 BPM | OXYGEN SATURATION: 97 % | WEIGHT: 160 LBS | DIASTOLIC BLOOD PRESSURE: 59 MMHG | TEMPERATURE: 97.9 F | HEIGHT: 67 IN

## 2022-08-28 DIAGNOSIS — K59.00 CONSTIPATION, UNSPECIFIED CONSTIPATION TYPE: ICD-10-CM

## 2022-08-28 DIAGNOSIS — R10.84 GENERALIZED ABDOMINAL PAIN: ICD-10-CM

## 2022-08-28 LAB
ALBUMIN SERPL BCP-MCNC: 4.8 G/DL (ref 3.2–4.9)
ALBUMIN/GLOB SERPL: 1.8 G/DL
ALP SERPL-CCNC: 44 U/L (ref 30–99)
ALT SERPL-CCNC: 16 U/L (ref 2–50)
ANION GAP SERPL CALC-SCNC: 13 MMOL/L (ref 7–16)
AST SERPL-CCNC: 24 U/L (ref 12–45)
BASOPHILS # BLD AUTO: 0.5 % (ref 0–1.8)
BASOPHILS # BLD: 0.05 K/UL (ref 0–0.12)
BILIRUB SERPL-MCNC: 0.4 MG/DL (ref 0.1–1.5)
BUN SERPL-MCNC: 14 MG/DL (ref 8–22)
CALCIUM SERPL-MCNC: 9.6 MG/DL (ref 8.5–10.5)
CHLORIDE SERPL-SCNC: 103 MMOL/L (ref 96–112)
CO2 SERPL-SCNC: 22 MMOL/L (ref 20–33)
CREAT SERPL-MCNC: 0.87 MG/DL (ref 0.5–1.4)
EOSINOPHIL # BLD AUTO: 0.1 K/UL (ref 0–0.51)
EOSINOPHIL NFR BLD: 1 % (ref 0–6.9)
ERYTHROCYTE [DISTWIDTH] IN BLOOD BY AUTOMATED COUNT: 38.2 FL (ref 35.9–50)
GFR SERPLBLD CREATININE-BSD FMLA CKD-EPI: 95 ML/MIN/1.73 M 2
GLOBULIN SER CALC-MCNC: 2.6 G/DL (ref 1.9–3.5)
GLUCOSE SERPL-MCNC: 91 MG/DL (ref 65–99)
HCT VFR BLD AUTO: 45 % (ref 37–47)
HGB BLD-MCNC: 15.4 G/DL (ref 12–16)
IMM GRANULOCYTES # BLD AUTO: 0.04 K/UL (ref 0–0.11)
IMM GRANULOCYTES NFR BLD AUTO: 0.4 % (ref 0–0.9)
LACTATE SERPL-SCNC: 1.9 MMOL/L (ref 0.5–2)
LIPASE SERPL-CCNC: 24 U/L (ref 11–82)
LYMPHOCYTES # BLD AUTO: 3.88 K/UL (ref 1–4.8)
LYMPHOCYTES NFR BLD: 40.7 % (ref 22–41)
MCH RBC QN AUTO: 29.2 PG (ref 27–33)
MCHC RBC AUTO-ENTMCNC: 34.2 G/DL (ref 33.6–35)
MCV RBC AUTO: 85.4 FL (ref 81.4–97.8)
MONOCYTES # BLD AUTO: 0.51 K/UL (ref 0–0.85)
MONOCYTES NFR BLD AUTO: 5.3 % (ref 0–13.4)
NEUTROPHILS # BLD AUTO: 4.96 K/UL (ref 2–7.15)
NEUTROPHILS NFR BLD: 52.1 % (ref 44–72)
NRBC # BLD AUTO: 0 K/UL
NRBC BLD-RTO: 0 /100 WBC
PLATELET # BLD AUTO: 271 K/UL (ref 164–446)
PMV BLD AUTO: 12.1 FL (ref 9–12.9)
POTASSIUM SERPL-SCNC: 3.8 MMOL/L (ref 3.6–5.5)
PROT SERPL-MCNC: 7.4 G/DL (ref 6–8.2)
RBC # BLD AUTO: 5.27 M/UL (ref 4.2–5.4)
SODIUM SERPL-SCNC: 138 MMOL/L (ref 135–145)
WBC # BLD AUTO: 9.5 K/UL (ref 4.8–10.8)

## 2022-08-28 PROCEDURE — 96375 TX/PRO/DX INJ NEW DRUG ADDON: CPT

## 2022-08-28 PROCEDURE — 700102 HCHG RX REV CODE 250 W/ 637 OVERRIDE(OP): Performed by: EMERGENCY MEDICINE

## 2022-08-28 PROCEDURE — 74018 RADEX ABDOMEN 1 VIEW: CPT

## 2022-08-28 PROCEDURE — 700105 HCHG RX REV CODE 258: Performed by: EMERGENCY MEDICINE

## 2022-08-28 PROCEDURE — 80053 COMPREHEN METABOLIC PANEL: CPT

## 2022-08-28 PROCEDURE — 96374 THER/PROPH/DIAG INJ IV PUSH: CPT

## 2022-08-28 PROCEDURE — 700111 HCHG RX REV CODE 636 W/ 250 OVERRIDE (IP)

## 2022-08-28 PROCEDURE — 36415 COLL VENOUS BLD VENIPUNCTURE: CPT

## 2022-08-28 PROCEDURE — 96376 TX/PRO/DX INJ SAME DRUG ADON: CPT

## 2022-08-28 PROCEDURE — 83605 ASSAY OF LACTIC ACID: CPT

## 2022-08-28 PROCEDURE — 700101 HCHG RX REV CODE 250: Performed by: EMERGENCY MEDICINE

## 2022-08-28 PROCEDURE — 700111 HCHG RX REV CODE 636 W/ 250 OVERRIDE (IP): Performed by: EMERGENCY MEDICINE

## 2022-08-28 PROCEDURE — 99285 EMERGENCY DEPT VISIT HI MDM: CPT

## 2022-08-28 PROCEDURE — A9270 NON-COVERED ITEM OR SERVICE: HCPCS | Performed by: EMERGENCY MEDICINE

## 2022-08-28 PROCEDURE — 83690 ASSAY OF LIPASE: CPT

## 2022-08-28 PROCEDURE — 85025 COMPLETE CBC W/AUTO DIFF WBC: CPT

## 2022-08-28 RX ORDER — FAMOTIDINE 20 MG
1 TABLET ORAL DAILY
Qty: 3 EACH | Refills: 0 | Status: SHIPPED | OUTPATIENT
Start: 2022-08-28 | End: 2022-08-28 | Stop reason: SDUPTHER

## 2022-08-28 RX ORDER — POLYETHYLENE GLYCOL 3350 17 G/17G
17 POWDER, FOR SOLUTION ORAL DAILY
Qty: 3 EACH | Refills: 0 | Status: SHIPPED | OUTPATIENT
Start: 2022-08-28 | End: 2022-08-31

## 2022-08-28 RX ORDER — DIPHENHYDRAMINE HYDROCHLORIDE 50 MG/ML
INJECTION INTRAMUSCULAR; INTRAVENOUS
Status: COMPLETED
Start: 2022-08-28 | End: 2022-08-28

## 2022-08-28 RX ORDER — LORAZEPAM 2 MG/ML
1 INJECTION INTRAMUSCULAR ONCE
Status: COMPLETED | OUTPATIENT
Start: 2022-08-28 | End: 2022-08-28

## 2022-08-28 RX ORDER — HALOPERIDOL 5 MG/ML
2.5 INJECTION INTRAMUSCULAR ONCE
Status: COMPLETED | OUTPATIENT
Start: 2022-08-28 | End: 2022-08-28

## 2022-08-28 RX ORDER — DICYCLOMINE HCL 20 MG
20 TABLET ORAL ONCE
Status: COMPLETED | OUTPATIENT
Start: 2022-08-28 | End: 2022-08-28

## 2022-08-28 RX ORDER — POLYETHYLENE GLYCOL 3350 17 G/17G
1 POWDER, FOR SOLUTION ORAL ONCE
Status: COMPLETED | OUTPATIENT
Start: 2022-08-28 | End: 2022-08-28

## 2022-08-28 RX ORDER — ONDANSETRON 2 MG/ML
4 INJECTION INTRAMUSCULAR; INTRAVENOUS ONCE
Status: COMPLETED | OUTPATIENT
Start: 2022-08-28 | End: 2022-08-28

## 2022-08-28 RX ORDER — POLYETHYLENE GLYCOL 3350 17 G/17G
17 POWDER, FOR SOLUTION ORAL DAILY
Qty: 3 EACH | Refills: 0 | Status: SHIPPED | OUTPATIENT
Start: 2022-08-28 | End: 2022-08-28 | Stop reason: SDUPTHER

## 2022-08-28 RX ORDER — DIPHENHYDRAMINE HYDROCHLORIDE 50 MG/ML
50 INJECTION INTRAMUSCULAR; INTRAVENOUS ONCE
Status: COMPLETED | OUTPATIENT
Start: 2022-08-28 | End: 2022-08-28

## 2022-08-28 RX ORDER — DICYCLOMINE HCL 20 MG
20 TABLET ORAL EVERY 6 HOURS
Qty: 40 TABLET | Refills: 0 | Status: SHIPPED | OUTPATIENT
Start: 2022-08-28 | End: 2022-08-28 | Stop reason: SDUPTHER

## 2022-08-28 RX ORDER — PROCHLORPERAZINE EDISYLATE 5 MG/ML
10 INJECTION INTRAMUSCULAR; INTRAVENOUS ONCE
Status: COMPLETED | OUTPATIENT
Start: 2022-08-28 | End: 2022-08-28

## 2022-08-28 RX ORDER — FAMOTIDINE 20 MG
1 TABLET ORAL DAILY
Qty: 3 EACH | Refills: 0 | Status: SHIPPED | OUTPATIENT
Start: 2022-08-28 | End: 2022-08-31

## 2022-08-28 RX ORDER — DICYCLOMINE HCL 20 MG
20 TABLET ORAL EVERY 6 HOURS
Qty: 40 TABLET | Refills: 0 | Status: SHIPPED | OUTPATIENT
Start: 2022-08-28 | End: 2022-09-07

## 2022-08-28 RX ORDER — ENEMA 19; 7 G/133ML; G/133ML
1 ENEMA RECTAL ONCE
Status: COMPLETED | OUTPATIENT
Start: 2022-08-28 | End: 2022-08-28

## 2022-08-28 RX ORDER — SODIUM CHLORIDE 9 MG/ML
1000 INJECTION, SOLUTION INTRAVENOUS ONCE
Status: COMPLETED | OUTPATIENT
Start: 2022-08-28 | End: 2022-08-28

## 2022-08-28 RX ORDER — DIPHENHYDRAMINE HYDROCHLORIDE 50 MG/ML
25 INJECTION INTRAMUSCULAR; INTRAVENOUS ONCE
Status: COMPLETED | OUTPATIENT
Start: 2022-08-28 | End: 2022-08-28

## 2022-08-28 RX ADMIN — ONDANSETRON 4 MG: 2 INJECTION INTRAMUSCULAR; INTRAVENOUS at 04:46

## 2022-08-28 RX ADMIN — DIPHENHYDRAMINE HYDROCHLORIDE 50 MG: 50 INJECTION INTRAMUSCULAR; INTRAVENOUS at 05:12

## 2022-08-28 RX ADMIN — DIPHENHYDRAMINE HYDROCHLORIDE 25 MG: 50 INJECTION INTRAMUSCULAR; INTRAVENOUS at 06:48

## 2022-08-28 RX ADMIN — LORAZEPAM 1 MG: 2 INJECTION INTRAMUSCULAR; INTRAVENOUS at 05:42

## 2022-08-28 RX ADMIN — SODIUM PHOSPHATE 133 ML: 7; 19 ENEMA RECTAL at 06:00

## 2022-08-28 RX ADMIN — DICYCLOMINE HYDROCHLORIDE 20 MG: 20 TABLET ORAL at 06:54

## 2022-08-28 RX ADMIN — SODIUM CHLORIDE 1000 ML: 9 INJECTION, SOLUTION INTRAVENOUS at 04:46

## 2022-08-28 RX ADMIN — PROCHLORPERAZINE EDISYLATE 10 MG: 5 INJECTION INTRAMUSCULAR; INTRAVENOUS at 04:46

## 2022-08-28 RX ADMIN — POLYETHYLENE GLYCOL 3350 1 PACKET: 17 POWDER, FOR SOLUTION ORAL at 06:48

## 2022-08-28 RX ADMIN — DIPHENHYDRAMINE HYDROCHLORIDE 50 MG: 50 INJECTION, SOLUTION INTRAMUSCULAR; INTRAVENOUS at 05:12

## 2022-08-28 RX ADMIN — HALOPERIDOL LACTATE 2.5 MG: 5 INJECTION, SOLUTION INTRAMUSCULAR at 06:48

## 2022-08-28 NOTE — ED TRIAGE NOTES
Chief Complaint   Patient presents with    Abdominal Pain     RUQ pain, + N/V, - passing gas x2 days    N/V     Pt arrives with complaint of abdominal pain that was worsen upon wakening around 0300. Pt woke up vomiting with some diarrhea. Last BM x 2 days.

## 2022-08-28 NOTE — ED NOTES
Pt given discharge instructions/prescription sent to pharm of choosing/ home care instructions explained, pt verbalized understanding of instructions given/pt understands the importance of follow up, pt to [facility], to be driven home by S/O.

## 2022-08-28 NOTE — ED PROVIDER NOTES
ED Provider Note    CHIEF COMPLAINT  Chief Complaint   Patient presents with    Abdominal Pain     RUQ pain, + N/V, - passing gas x2 days    N/V     HPI  Patient is a 26-year-old female who presents emergency room with worsening right upper flank discomfort with ongoing constipation.  Patient has been seen for multiple episodes of chronic abdominal discomfort.  Thankfully on previous work-ups urinalysis and lab work was unremarkable.  The patient was then back for further pain and discomfort with the development of further work-up by the previous physician.  This had no acute leukocytosis, no gross electrolyte abnormalities, urinalysis without signs of infection or stone pathology, CT abdomen pelvis was then obtained due to the severity of her symptoms and this showed no acute process beyond findings consistent with constipation.  She was advised to take magnesium citrate but she was unable to find some and took some laxatives.  After taking these her abdominal pain worsened.    Pregnancy test, UA and wet mount were all reassuring on prior assessment    At this point there is no clear etiology as to why she has her chronic pain. She has an appointment with gastroenterology for upcoming colonoscopy for further evaluation there. Her half-brother did have history of adenocarcinoma. Mother with history of CLL.    Last menstrual period was approximately one month ago on time and regular. No current vaginally or discharge. Currently her pain continues to be moderate to severe. Seems to be worse with pressure on her right flank.     PPE Note: I personally donned full PPE for all patient encounters during this visit, including being clean-shaven with an N95 respirator mask, gloves, and goggles.     REVIEW OF SYSTEMS  See HPI for further details. All other systems are negative.     PAST MEDICAL HISTORY       SOCIAL HISTORY  Social History     Tobacco Use    Smoking status: Never    Smokeless tobacco: Never   Substance and  "Sexual Activity    Alcohol use: Not Currently    Drug use: Never    Sexual activity: Not on file     SURGICAL HISTORY  patient denies any surgical history    CURRENT MEDICATIONS  Home Medications       Reviewed by Dina Hernández R.N. (Registered Nurse) on 08/28/22 at 0642  Med List Status: Not Addressed     Medication Last Dose Status        Patient Akin Taking any Medications                         ALLERGIES  Allergies   Allergen Reactions    Gluten Meal     Iodine        PHYSICAL EXAM  VITAL SIGNS: /54   Pulse 88   Temp 36.7 °C (98 °F)   Resp 16   Ht 1.702 m (5' 7\")   Wt 72.6 kg (160 lb)   LMP 08/26/2022 (Exact Date)   SpO2 96%   BMI 25.06 kg/m²   Pulse ox interpretation: I interpret this pulse ox as normal.  Genl: F sitting in gurney uncomfortably, speaking clearly, appears in moderate distress   Head: NC/AT   ENT: Mucous membranes slightly dry, posterior pharynx clear, uvula midline, nares patent bilaterally   Pulmonary: Lungs are clear to auscultation bilaterally  CV:  RRR, no murmur appreciated, pulses 2+ in both upper and lower extremities,  Abdomen: soft, substantial amounts of nonspecific abdominal discomfort without distention, no true flank discomfort or localization.  No pain with bilateral straight leg raise. no rebound/guarding, no masses palpated, no HSM   Musculoskeletal: Pain free ROM of the neck. Moving upper and lower extremities and spontaneous in coordinated fashion  Neuro: A&Ox4 (person, place, time, situation), speech fluent, gait steady, no focal deficits appreciated  Skin: No rash or lesions.  No pallor or jaundice.  No cyanosis.  Warm and dry.     DIAGNOSTIC STUDIES / PROCEDURES    LABS  Labs Reviewed   CBC WITH DIFFERENTIAL   COMP METABOLIC PANEL   LACTIC ACID   LIPASE   ESTIMATED GFR     RADIOLOGY  IC-VNOUSAR-9 VIEW   Final Result      Nonspecific bowel gas pattern.        COURSE & MEDICAL DECISION MAKING  Pertinent Labs & Imaging studies reviewed. (See chart for " details)    DDX:  Ectopic pregnancy  Ovarian torsion  Cholecystitis  Appendicitis  Diverticulitis  Neprolithiasis  UTI/Cystitis  Gastroenteritis  Pregnancy    MDM  Initial evaluation at 0425:  Patient is seen and evaluated for symptoms as described above.  The patient has been seen for abdominal discomfort and nausea acute assessment she does have some nonspecific flank discomfort without skin changes, nonspecific abdominal discomfort without gross distention, fluid wave or other localizing findings.  Her vital signs are reassuring she is afebrile.  She has been seen for some element of functional abdominal pain previously and labs, ultrasounds and previous CT from 2 days ago was reviewed.  There is no new or evolving clinical exam findings at this time, she does not have vaginal complaints and is currently on her period.  Transvaginal ultrasound did not show any findings of endometriosis no free fluid and CT scan from 2 days ago shows moderate amount of stool burden.  Rectal exam was performed and she does not have acute impaction requiring emergent intervention.  As she was unable to get initial cathartics patient was given fleets and MiraLAX with only small amounts of liquid stool passed.  Abdominal x-ray does not show any sign of diaphragmatic air, no signs of SBO and she is able to tolerate small amounts of oral medications without difficulty.  She did receive fluid hydration as she clinically appeared somewhat dry and overall feels improved with subsequent administration of low-dose Haldol and Benadryl as she appears to have nonspecific and generalized abdominal pain with no vital sign abnormalities or acute infectious source.    At this time the patient does not have an acute emergent condition requiring surgical intervention.  She is gradually feeling more improved and has outpatient follow-up including colonoscopy already scheduled with a gastroenterologist.  I would recommend initiated of Zofran, 3 days of  fleets and MiraLAX, Bentyl as needed for abdominal spasms.  Development of fevers, localization of her abdominal pain or vital sign abnormalities would require further assessment and return to the emergency department.      Patient is discharged home in stable condition.    HYDRATION: Based on the patient's presentation of Dehydration and Inability to take oral fluids the patient was given IV fluids. IV Hydration was used because oral hydration was not adequate alone. Upon recheck following hydration, the patient was improved.    The patient will not drink alcohol nor drive with prescribed medications. The patient will return for worsening symptoms and is stable at the time of discharge. The patient verbalizes understanding and will comply.    FINAL IMPRESSION  Visit Diagnoses     ICD-10-CM   1. Generalized abdominal pain  R10.84   2. Constipation, unspecified constipation type  K59.00     Electronically signed by: Jv Gastelum M.D., 8/28/2022 4:25 AM

## 2022-09-01 ENCOUNTER — HOSPITAL ENCOUNTER (EMERGENCY)
Facility: MEDICAL CENTER | Age: 27
End: 2022-09-01
Attending: EMERGENCY MEDICINE
Payer: COMMERCIAL

## 2022-09-01 VITALS
HEIGHT: 67 IN | OXYGEN SATURATION: 96 % | RESPIRATION RATE: 17 BRPM | SYSTOLIC BLOOD PRESSURE: 99 MMHG | WEIGHT: 160 LBS | TEMPERATURE: 97.3 F | DIASTOLIC BLOOD PRESSURE: 61 MMHG | BODY MASS INDEX: 25.11 KG/M2 | HEART RATE: 72 BPM

## 2022-09-01 DIAGNOSIS — K59.09 CHRONIC CONSTIPATION: ICD-10-CM

## 2022-09-01 DIAGNOSIS — R10.9 ABDOMINAL PAIN, UNSPECIFIED ABDOMINAL LOCATION: ICD-10-CM

## 2022-09-01 PROCEDURE — 99283 EMERGENCY DEPT VISIT LOW MDM: CPT

## 2022-09-01 RX ORDER — METOCLOPRAMIDE 10 MG/1
10 TABLET ORAL 4 TIMES DAILY
Qty: 10 TABLET | Refills: 0 | Status: SHIPPED | OUTPATIENT
Start: 2022-09-01 | End: 2023-04-05

## 2022-09-01 RX ORDER — POLYETHYLENE GLYCOL 3350, SODIUM SULFATE ANHYDROUS, SODIUM BICARBONATE, SODIUM CHLORIDE, POTASSIUM CHLORIDE 236; 22.74; 6.74; 5.86; 2.97 G/4L; G/4L; G/4L; G/4L; G/4L
4 POWDER, FOR SOLUTION ORAL ONCE
Qty: 4000 ML | Refills: 0 | Status: SHIPPED | OUTPATIENT
Start: 2022-09-01 | End: 2022-09-01

## 2022-09-01 ASSESSMENT — FIBROSIS 4 INDEX: FIB4 SCORE: 0.53

## 2022-09-01 NOTE — ED TRIAGE NOTES
"Chief Complaint   Patient presents with    N/V     Pt threw up stomach contents this morning    Constipation     Pt was discharged for same complaint 4 days ago. No improvement despite performing prescribed enemas and OTC medications, and pt reports associated back pain.       Pt BIB EMS, Ambulatory and independent  to [facility]. Pt arrives with the chief complaints listed above. She was seen here 4 days ago for the same complaint, and reports that she is \"still constipated despite trying everything\". Pt reports nausea, vomiting, and decreased appetite for the last few days. ERP to see.    BP (!) 94/61   Pulse 73   Temp 36.5 °C (97.7 °F) (Temporal)   Resp 18   Ht 1.702 m (5' 7\")   Wt 72.6 kg (160 lb)   LMP 08/26/2022 (Exact Date)   SpO2 97%   BMI 25.06 kg/m²    "

## 2022-09-01 NOTE — ED PROVIDER NOTES
ED Provider Note    CHIEF COMPLAINT  Chief Complaint   Patient presents with    N/V     Pt threw up stomach contents this morning    Constipation     Pt was discharged for same complaint 4 days ago. No improvement despite performing prescribed enemas and OTC medications, and pt reports associated back pain.       HPI  Michel Angelo is a 26 y.o. female who returns to the emergency department with ongoing abdominal pain and constipation.  Patient has been seen in the emergency department relatively frequently for issues related to chronic abdominal pain and chronic constipation.  Patient had a CT abdomen pelvis with contrast, a diagnostic x-ray, basic labs x2, urinalysis, pregnancy test, wet prep, GC chlamydia all of which have been unremarkable.  Patient reports associated nausea and vomiting.  Patient has upcoming appointment with gastroenterology.   Patient reports that she has been very diligent about addressing this issue, she has been taking MiraLAX daily, furthermore she is taking senna docusate, she also tried a over-the-counter laxative.  Despite this she continues to have issues and has not been able to have a meaningful bowel movement for weeks now.  She reports that when she does have a bowel movement it is typically liquid and small.  Patient denies any associated fevers or chills.  She does have a history of celiac's disease and is gluten-free.  Patient reports that she has a constant feeling of being full and her appetite has become progressively more suppressed.      REVIEW OF SYSTEMS  ROS  See HPI for further details. All other systems are negative.     PAST MEDICAL HISTORY   has a past medical history of Chronic constipation (4/8/2016), Dysmenorrhea (4/8/2016), Hypotension (2/21/2020), NEGATIVE HISTORY OF, and Neuritis- right scalp cutaneous x 5 days (9/22/2016).    SOCIAL HISTORY  Social History     Tobacco Use    Smoking status: Never    Smokeless tobacco: Never   Vaping Use    Vaping Use:  Never used   Substance and Sexual Activity    Alcohol use: Not Currently     Comment: occasional    Drug use: Never    Sexual activity: Yes     Partners: Female       SURGICAL HISTORY   has a past surgical history that includes knee arthroscopy (Left, 05/2018); rhinoplasty (03/2019); knee arthroscopy (Right, 8/22/2019); meniscectomy, knee, medial (Right, 8/22/2019); and meniscal repair (Right, 8/22/2019).    CURRENT MEDICATIONS  Home Medications    **Home medications have not yet been reviewed for this encounter**         ALLERGIES  Allergies   Allergen Reactions    Gluten Meal Diarrhea and Vomiting     Diarrhea      Iodine Contrast [Diagnostic X-Ray Materials] Shortness of Breath     Tight chest    Gluten Meal     Iodine        PHYSICAL EXAM  Vitals:    09/01/22 0958   BP: (!) 94/61   Pulse: 73   Resp: 18   Temp: 36.5 °C (97.7 °F)   SpO2: 97%       Physical Exam  Constitutional:       Appearance: She is well-developed.   HENT:      Head: Normocephalic and atraumatic.   Eyes:      Conjunctiva/sclera: Conjunctivae normal.   Cardiovascular:      Rate and Rhythm: Normal rate and regular rhythm.   Pulmonary:      Effort: Pulmonary effort is normal.      Breath sounds: Normal breath sounds.   Abdominal:      General: Bowel sounds are normal. There is no distension.      Palpations: Abdomen is soft.      Tenderness: There is no abdominal tenderness. There is no rebound.   Musculoskeletal:      Cervical back: Normal range of motion and neck supple.   Skin:     General: Skin is warm and dry.      Findings: No rash.   Neurological:      Mental Status: She is alert and oriented to person, place, and time.   Psychiatric:         Behavior: Behavior normal.     Results for orders placed or performed during the hospital encounter of 08/28/22   CBC WITH DIFFERENTIAL   Result Value Ref Range    WBC 9.5 4.8 - 10.8 K/uL    RBC 5.27 4.20 - 5.40 M/uL    Hemoglobin 15.4 12.0 - 16.0 g/dL    Hematocrit 45.0 37.0 - 47.0 %    MCV 85.4 81.4  - 97.8 fL    MCH 29.2 27.0 - 33.0 pg    MCHC 34.2 33.6 - 35.0 g/dL    RDW 38.2 35.9 - 50.0 fL    Platelet Count 271 164 - 446 K/uL    MPV 12.1 9.0 - 12.9 fL    Neutrophils-Polys 52.10 44.00 - 72.00 %    Lymphocytes 40.70 22.00 - 41.00 %    Monocytes 5.30 0.00 - 13.40 %    Eosinophils 1.00 0.00 - 6.90 %    Basophils 0.50 0.00 - 1.80 %    Immature Granulocytes 0.40 0.00 - 0.90 %    Nucleated RBC 0.00 /100 WBC    Neutrophils (Absolute) 4.96 2.00 - 7.15 K/uL    Lymphs (Absolute) 3.88 1.00 - 4.80 K/uL    Monos (Absolute) 0.51 0.00 - 0.85 K/uL    Eos (Absolute) 0.10 0.00 - 0.51 K/uL    Baso (Absolute) 0.05 0.00 - 0.12 K/uL    Immature Granulocytes (abs) 0.04 0.00 - 0.11 K/uL    NRBC (Absolute) 0.00 K/uL   COMP METABOLIC PANEL   Result Value Ref Range    Sodium 138 135 - 145 mmol/L    Potassium 3.8 3.6 - 5.5 mmol/L    Chloride 103 96 - 112 mmol/L    Co2 22 20 - 33 mmol/L    Anion Gap 13.0 7.0 - 16.0    Glucose 91 65 - 99 mg/dL    Bun 14 8 - 22 mg/dL    Creatinine 0.87 0.50 - 1.40 mg/dL    Calcium 9.6 8.5 - 10.5 mg/dL    AST(SGOT) 24 12 - 45 U/L    ALT(SGPT) 16 2 - 50 U/L    Alkaline Phosphatase 44 30 - 99 U/L    Total Bilirubin 0.4 0.1 - 1.5 mg/dL    Albumin 4.8 3.2 - 4.9 g/dL    Total Protein 7.4 6.0 - 8.2 g/dL    Globulin 2.6 1.9 - 3.5 g/dL    A-G Ratio 1.8 g/dL   LACTIC ACID   Result Value Ref Range    Lactic Acid 1.9 0.5 - 2.0 mmol/L   LIPASE   Result Value Ref Range    Lipase 24 11 - 82 U/L   ESTIMATED GFR   Result Value Ref Range    GFR (CKD-EPI) 95 >60 mL/min/1.73 m 2     No orders to display         COURSE & MEDICAL DECISION MAKING  Pertinent Labs & Imaging studies reviewed. (See chart for details)    Patient here with ongoing issues of constipation.  She has had a thorough emergency department work-up including basic labs, and CT which were unremarkable.  Unfortunately despite considerable management of her constipation her symptoms persist.  We will discussed the case with gastroenterology for further assistance  and symptomatic management until she is seen by them later this month  Gastroenterology recommends patient started on GoLytely bowel prep, with Reglan to help her through the bowel prep.  They also agree with Linzess for patient's ongoing symptoms.  Suspect possible IBS.  Patient abdominal exam is entirely benign, she has had a recent CT, I believe that surgical pathology is highly unlikely.  Patient will also increase her MiraLAX.  I have discussed return precautions with patient, she understands that if symptoms worsen she can call me or return to the emergency department.    The patient will return for worsening symptoms and is stable at the time of discharge. The patient verbalizes understanding and will comply.    FINAL IMPRESSION    1. Abdominal pain, unspecified abdominal location    2. Chronic constipation               Electronically signed by: Domo Verma M.D., 9/1/2022 10:04 AM

## 2022-09-01 NOTE — DISCHARGE INSTRUCTIONS
I discussed your case with Dr. Pruett  She would like you to start GoLytely.  Take reglan, Get through the first 2 L and then take a second Reglan and finish the regimen.  Take Linzess as well, you may also need to increase your MiraLAX 2-3 times a day.  With all this and this should help resolve your symptoms, if despite this you are not improving you can call me at [phone]  You can also take the Bentyl as needed for abdominal cramping by Dr. Gastelum prescribed you or  simethicone (gasex) as well to help with your symptoms

## 2022-09-01 NOTE — ED NOTES
Pt given discharge instructions/prescription sent to pharm of choosing/ home care instructions explained, pt verbalized understanding of instructions given/pt understands the importance of follow up, pt ambulatory to [facility].

## 2022-09-29 ENCOUNTER — IMMUNIZATION (OUTPATIENT)
Dept: OCCUPATIONAL MEDICINE | Facility: CLINIC | Age: 27
End: 2022-09-29

## 2022-09-29 DIAGNOSIS — Z23 NEED FOR VACCINATION: Primary | ICD-10-CM

## 2022-09-29 PROCEDURE — 90686 IIV4 VACC NO PRSV 0.5 ML IM: CPT | Performed by: PREVENTIVE MEDICINE

## 2022-11-18 ENCOUNTER — OFFICE VISIT (OUTPATIENT)
Dept: URGENT CARE | Facility: CLINIC | Age: 27
End: 2022-11-18
Payer: COMMERCIAL

## 2022-11-18 ENCOUNTER — APPOINTMENT (OUTPATIENT)
Dept: URGENT CARE | Facility: CLINIC | Age: 27
End: 2022-11-18

## 2022-11-18 VITALS
DIASTOLIC BLOOD PRESSURE: 78 MMHG | BODY MASS INDEX: 24.25 KG/M2 | OXYGEN SATURATION: 100 % | WEIGHT: 154.5 LBS | RESPIRATION RATE: 18 BRPM | SYSTOLIC BLOOD PRESSURE: 122 MMHG | TEMPERATURE: 98.7 F | HEIGHT: 67 IN | HEART RATE: 89 BPM

## 2022-11-18 DIAGNOSIS — J01.40 ACUTE NON-RECURRENT PANSINUSITIS: ICD-10-CM

## 2022-11-18 LAB
EXTERNAL QUALITY CONTROL: NORMAL
INT CON NEG: NORMAL
INT CON POS: NORMAL
SARS-COV+SARS-COV-2 AG RESP QL IA.RAPID: NEGATIVE

## 2022-11-18 PROCEDURE — 99213 OFFICE O/P EST LOW 20 MIN: CPT | Performed by: NURSE PRACTITIONER

## 2022-11-18 PROCEDURE — 87426 SARSCOV CORONAVIRUS AG IA: CPT | Performed by: NURSE PRACTITIONER

## 2022-11-18 RX ORDER — AMOXICILLIN AND CLAVULANATE POTASSIUM 875; 125 MG/1; MG/1
1 TABLET, FILM COATED ORAL 2 TIMES DAILY
Qty: 20 TABLET | Refills: 0 | Status: SHIPPED | OUTPATIENT
Start: 2022-11-18 | End: 2022-11-28

## 2022-11-18 RX ORDER — FLUCONAZOLE 150 MG/1
TABLET ORAL
Qty: 2 TABLET | Refills: 0 | Status: SHIPPED | OUTPATIENT
Start: 2022-11-18 | End: 2023-05-04

## 2022-11-18 ASSESSMENT — FIBROSIS 4 INDEX: FIB4 SCORE: 0.55

## 2022-11-18 NOTE — PROGRESS NOTES
Patient has consented to treatment and for use of patient information for treatment and billing purposes.    Date: 11/18/22     Arrival Mode: Private Vehicle / Ambulatory    Chief Complaint:    Chief Complaint   Patient presents with    Nasal Congestion     X 1 WEEK LOSS OF VOICE/CHEST CONGESTION/COUGH/GREEN BROWN/PHLEGM/LOSS OF SMELL        History of Present Illness: 27 y.o. female  presents to clinic on day 8 of sore throat hoarse voice cough worsening sinus pain and pressure with postnasal drip.  Patient has been taking Tylenol Cold and flu which states is helpful during the day.  She has been trying NyQuil at night although states it is not helpful as her cough is worse at night when laying down.  Patient states she does have a decrease of smell and has noticed over the past several days a change in the color of her mucus.  Denies any severe shortness of breath chest pain or leg swelling.  No nausea vomiting diarrhea.      ROS:  As stated in HPI     Pertinent Medical History:    Past Medical History:   Diagnosis Date    Chronic constipation 4/8/2016    Dysmenorrhea 4/8/2016    Hypotension 2/21/2020    -I suspect secondary to hypovolemia -We will run some serum studies -I have recommended that she increase from 2 of the 40 ounce flask's to 3 or 4 especially given that she works out for 1 hour a day 7 days a week -I also recommend considering electrolyte tabs either daily or every other day pending how she feels -I do not suspect inner ear issues from her recent viral illness -rto if symptoms do     NEGATIVE HISTORY OF     Neuritis- right scalp cutaneous x 5 days 9/22/2016        Pertinent Surgical History:    Past Surgical History:   Procedure Laterality Date    KNEE ARTHROSCOPY Right 8/22/2019    Procedure: ARTHROSCOPY, KNEE;  Surgeon: Vinay Moore M.D.;  Location: SURGERY [facility];  Service: Orthopedics    MENISCECTOMY, KNEE, MEDIAL Right 8/22/2019    Procedure: MENISCECTOMY, KNEE, MEDIAL -  PARTIAL VERSUS;  Surgeon: Vinay Moore M.D.;  Location: SURGERY [facility];  Service: Orthopedics    MENISCAL REPAIR Right 8/22/2019    Procedure: REPAIR, MENISCUS, KNEE - PROCEED AS INDICATED;  Surgeon: Vinay Moore M.D.;  Location: SURGERY [facility];  Service: Orthopedics    RHINOPLASTY  03/2019    KNEE ARTHROSCOPY Left 05/2018        Current  Medications:  Current Outpatient Medications on File Prior to Visit   Medication Sig Dispense Refill    linaCLOtide 145 MCG Cap Take 1 Capsule by mouth every day. 30 Capsule 0    metoclopramide (REGLAN) 10 MG Tab Take 1 Tablet by mouth 4 times a day. (Patient not taking: Reported on 11/18/2022) 10 Tablet 0    Multiple Vitamin (MULTIVITAMIN PO) Take 1 Tablet by mouth every evening. (Patient not taking: Reported on 11/18/2022)      melatonin 5 mg Tab Take 10 mg by mouth at bedtime as needed (sleep). (Patient not taking: Reported on 11/18/2022)      acetaminophen (TYLENOL) 325 MG Tab Take 650 mg by mouth every four hours as needed for Moderate Pain. (Patient not taking: Reported on 11/18/2022)      Homeopathic Products (ARNICA EX) Apply 1 Application topically 3 times a day as needed (pain). (Patient not taking: Reported on 11/18/2022)       No current facility-administered medications on file prior to visit.        Allergies:     Gluten meal, Iodine contrast [diagnostic x-ray materials], Gluten meal, and Iodine     Social History:   Social History     Socioeconomic History    Marital status: Single     Spouse name: Not on file    Number of children: Not on file    Years of education: Not on file    Highest education level: Not on file   Occupational History    Occupation: [employer]- 10th grade     Employer: [employer]   Tobacco Use    Smoking status: Never    Smokeless tobacco: Never   Vaping Use    Vaping Use: Never used   Substance and Sexual Activity    Alcohol use: Yes     Comment: occasional    Drug use: Never    Sexual activity: Yes      Partners: Female   Other Topics Concern    Not on file   Social History Narrative    ** Merged History Encounter **         Has 3 sisters. 1 brother.              Social Determinants of Health     Financial Resource Strain: Not on file   Food Insecurity: Not on file   Transportation Needs: Not on file   Physical Activity: Not on file   Stress: Not on file   Social Connections: Not on file   Intimate Partner Violence: Not on file   Housing Stability: Not on file        Patient's last menstrual period was 10/18/2022 (exact date).       Physical Exam:  Vitals:    11/18/22 1148   BP: 122/78   Pulse: 89   Resp: 18   Temp: 37.1 °C (98.7 °F)   SpO2: 100%        Physical Exam  Vitals reviewed.   Constitutional:       General: She is not in acute distress.     Appearance: Normal appearance. She is well-developed. She is not toxic-appearing.   HENT:      Head: Normocephalic and atraumatic.      Right Ear: Tympanic membrane, ear canal and external ear normal.      Left Ear: Tympanic membrane, ear canal and external ear normal.      Nose: Congestion and rhinorrhea present.      Right Sinus: Maxillary sinus tenderness and frontal sinus tenderness present.      Left Sinus: Maxillary sinus tenderness and frontal sinus tenderness present.      Mouth/Throat:      Lips: Pink.      Mouth: Mucous membranes are moist.      Pharynx: Posterior oropharyngeal erythema present.   Eyes:      General: Lids are normal. Gaze aligned appropriately. No allergic shiner or scleral icterus.     Extraocular Movements: Extraocular movements intact.      Conjunctiva/sclera: Conjunctivae normal.      Pupils: Pupils are equal, round, and reactive to light.   Cardiovascular:      Rate and Rhythm: Normal rate and regular rhythm.      Pulses:           Radial pulses are 2+ on the right side and 2+ on the left side.      Heart sounds: Normal heart sounds.   Pulmonary:      Effort: Pulmonary effort is normal.      Breath sounds: Normal breath sounds. No  wheezing.   Musculoskeletal:      Right lower leg: No edema.      Left lower leg: No edema.   Lymphadenopathy:      Cervical: No cervical adenopathy.   Skin:     General: Skin is warm.      Capillary Refill: Capillary refill takes less than 2 seconds.      Coloration: Skin is not cyanotic or pale.      Findings: No rash.   Neurological:      Mental Status: She is alert.      Gait: Gait is intact.   Psychiatric:         Behavior: Behavior normal. Behavior is cooperative.        Diagnostics:        Recent Results (from the past 24 hour(s))   POCT SARS-COV Antigen RAMOS (Symptomatic only)    Collection Time: 11/18/22 12:45 PM   Result Value Ref Range    Internal  Valid     SARS-COV ANTIGEN RAMOS Negative Negative, Indeterminate, None Detected, Not Detected, Detected, NotDetected, Valid, Invalid, Pass    Internal Control Positive Valid     Internal Control Negative Valid              Medical Decision Making:  I personally reviewed prior external notes and test results pertinent to today's visit.   Shared decision-making was utilized with patient did develop treatment plan and clinic course. Pleasant, 27 y.o. female did obtain a COVID-19 swab fortunately negative.  Patient does work in the hospital [employer] so she may be exposed to multiple different viruses.  Although due to the longevity of symptoms do feel patient has acute bacterial rhinosinusitis at this time.  We will send for Augmentin at this time patient also has a history of antibiotic induced vaginal yeast infections we will send for Diflucan.    Did advise patient on conservative measures for management of symptoms.  Patient is agreeable to pursue adequate rest, adequate hydration, saltwater gargle and Neti pot for any symptoms of upper respiratory congestion.  Over-the-counter analgesia and antipyretics on a p.r.n. basis as needed for pain and fever.  Did discuss over-the-counter cough medications.      Patient will monitor symptoms closely for  worsening and is advised to seek further evaluation the emergency room if alarm signs or symptoms arise.  Patient states understanding and verbalizes agreement with this plan of care.    Plan:    1. Acute non-recurrent pansinusitis    - POCT SARS-COV Antigen RAMOS (Symptomatic only)  - amoxicillin-clavulanate (AUGMENTIN) 875-125 MG Tab; Take 1 Tablet by mouth 2 times a day for 10 days.  Dispense: 20 Tablet; Refill: 0  - fluconazole (DIFLUCAN) 150 MG tablet; Take one tablet orally for yeast infection, if symptoms persist, may repeat treatment after 72 hours.  Dispense: 2 Tablet; Refill: 0       Disposition:  Patient was discharged in stable condition.    Voice Recognition Disclaimer: Portions of this document were created using voice recognition software. The software does have a chance of producing errors of grammar and possibly content. I have made every reasonable attempt to correct obvious errors, but there may be errors of grammar and possibly content that I did not discover before finalizing the documentation.    Jacinta Rob, [email or web address].

## 2022-11-18 NOTE — LETTER
November 18, 2022    To Whom It May Concern:         This is confirmation that Michel Angelo attended her scheduled appointment with ANGIE Wing on 11/18/22. Please excuse from work 11/18/22-11/19/22.  May return to work 11/21/22         If you have any questions please do not hesitate to call me at the phone number listed below.    Sincerely,          MARGARET Wing.  [phone]

## 2022-12-22 ENCOUNTER — OFFICE VISIT (OUTPATIENT)
Dept: MEDICAL GROUP | Facility: IMAGING CENTER | Age: 27
End: 2022-12-22
Payer: COMMERCIAL

## 2022-12-22 VITALS
OXYGEN SATURATION: 97 % | HEART RATE: 102 BPM | HEIGHT: 67 IN | SYSTOLIC BLOOD PRESSURE: 104 MMHG | DIASTOLIC BLOOD PRESSURE: 60 MMHG | RESPIRATION RATE: 18 BRPM | WEIGHT: 155 LBS | TEMPERATURE: 98.3 F | BODY MASS INDEX: 24.33 KG/M2

## 2022-12-22 DIAGNOSIS — N64.59 BREAST COMPLAINT: ICD-10-CM

## 2022-12-22 DIAGNOSIS — F64.0 GENDER DYSPHORIA IN ADULT: ICD-10-CM

## 2022-12-22 PROCEDURE — 99213 OFFICE O/P EST LOW 20 MIN: CPT | Performed by: CLINICAL NURSE SPECIALIST

## 2022-12-22 ASSESSMENT — FIBROSIS 4 INDEX: FIB4 SCORE: 0.55

## 2022-12-22 ASSESSMENT — PAIN SCALES - GENERAL: PAINLEVEL: NO PAIN

## 2022-12-22 NOTE — ASSESSMENT & PLAN NOTE
Physical and emotional discomfort with breasts chronic.  Feels they should not be part of her.  Affects intimacy and she wears tight sports bras to flatten them out and this causes discomfort.  She experiences mental distress daily related to her breasts and has thought about having a mastectomy for a couple of years. She feels she is ready to move forward with this process now.

## 2022-12-22 NOTE — PROGRESS NOTES
Subjective     Michel Angelo is a 27 y.o. female who presents with Referral Needed (For plastic office ) and Cough (Started yesterday )            HPI  Michel would like a mastectomy.    Breast complaint  Physical and emotional discomfort with breasts chronic.  Feels they should not be part of her.  Affects intimacy and she wears tight sports bras to flatten them out and this causes discomfort.  She experiences mental distress daily related to her breasts and has thought about having a mastectomy for a couple of years. She feels she is ready to move forward with this process now.    ROS  See HPI    Allergies   Allergen Reactions    Gluten Meal Diarrhea and Vomiting     Diarrhea      Iodine Contrast [Diagnostic X-Ray Materials] Shortness of Breath     Tight chest    Gluten Meal     Iodine      Current Outpatient Medications on File Prior to Visit   Medication Sig Dispense Refill    fluconazole (DIFLUCAN) 150 MG tablet Take one tablet orally for yeast infection, if symptoms persist, may repeat treatment after 72 hours. 2 Tablet 0    metoclopramide (REGLAN) 10 MG Tab Take 1 Tablet by mouth 4 times a day. (Patient not taking: Reported on 11/18/2022) 10 Tablet 0    linaCLOtide 145 MCG Cap Take 1 Capsule by mouth every day. 30 Capsule 0    Multiple Vitamin (MULTIVITAMIN PO) Take 1 Tablet by mouth every evening. (Patient not taking: Reported on 11/18/2022)      melatonin 5 mg Tab Take 10 mg by mouth at bedtime as needed (sleep). (Patient not taking: Reported on 11/18/2022)      acetaminophen (TYLENOL) 325 MG Tab Take 650 mg by mouth every four hours as needed for Moderate Pain. (Patient not taking: Reported on 11/18/2022)      Homeopathic Products (ARNICA EX) Apply 1 Application topically 3 times a day as needed (pain). (Patient not taking: Reported on 11/18/2022)       No current facility-administered medications on file prior to visit.              Objective     /60 (BP Location: Right arm, Patient  "Position: Sitting, BP Cuff Size: Adult)   Pulse (!) 102   Temp 36.8 °C (98.3 °F) (Temporal)   Resp 18   Ht 1.702 m (5' 7\")   Wt 70.3 kg (155 lb)   LMP 12/10/2022 (Exact Date)   SpO2 97%   BMI 24.28 kg/m²      Physical Exam  Constitutional:       General: She is not in acute distress.     Appearance: Normal appearance. She is not ill-appearing, toxic-appearing or diaphoretic.   HENT:      Head: Normocephalic and atraumatic.   Eyes:      Pupils: Pupils are equal, round, and reactive to light.   Cardiovascular:      Rate and Rhythm: Normal rate.   Pulmonary:      Effort: Pulmonary effort is normal.   Skin:     General: Skin is warm and dry.   Neurological:      Mental Status: She is alert and oriented to person, place, and time.      Gait: Gait normal.   Psychiatric:         Mood and Affect: Mood normal. Affect is tearful.         Behavior: Behavior normal.         Thought Content: Thought content normal.         Judgment: Judgment normal.                           Assessment & Plan   Abdominal pain and vomiting have resolved.     1. Gender dysphoria in adult  Chronic related to breasts with strong desire to be free of female breasts.    - Referral to Plastic Surgery-Dr Marino    2. Breast complaint  Chronic distress physically and mentally associated with breasts. Referral to plastic surgeon Dr Marino for consultation.    - Referral to Plastic Surgery     Return if symptoms worsen or fail to improve.    My total time spent caring for the patient on the day of the encounter was 20 minutes.   This does not include time spent on separately billable procedures/tests.            "

## 2022-12-23 ENCOUNTER — HOSPITAL ENCOUNTER (EMERGENCY)
Facility: MEDICAL CENTER | Age: 27
End: 2022-12-23
Attending: EMERGENCY MEDICINE
Payer: COMMERCIAL

## 2022-12-23 ENCOUNTER — APPOINTMENT (OUTPATIENT)
Dept: RADIOLOGY | Facility: MEDICAL CENTER | Age: 27
End: 2022-12-23
Attending: EMERGENCY MEDICINE
Payer: COMMERCIAL

## 2022-12-23 VITALS
HEART RATE: 108 BPM | TEMPERATURE: 101.5 F | OXYGEN SATURATION: 92 % | WEIGHT: 156.31 LBS | RESPIRATION RATE: 16 BRPM | SYSTOLIC BLOOD PRESSURE: 102 MMHG | BODY MASS INDEX: 24.53 KG/M2 | HEIGHT: 67 IN | DIASTOLIC BLOOD PRESSURE: 59 MMHG

## 2022-12-23 DIAGNOSIS — J10.1 INFLUENZA A: Primary | ICD-10-CM

## 2022-12-23 LAB
ALBUMIN SERPL BCP-MCNC: 4.3 G/DL (ref 3.2–4.9)
ALBUMIN/GLOB SERPL: 1.5 G/DL
ALP SERPL-CCNC: 52 U/L (ref 30–99)
ALT SERPL-CCNC: 21 U/L (ref 2–50)
ANION GAP SERPL CALC-SCNC: 10 MMOL/L (ref 7–16)
APPEARANCE UR: CLEAR
AST SERPL-CCNC: 27 U/L (ref 12–45)
BASOPHILS # BLD AUTO: 0.6 % (ref 0–1.8)
BASOPHILS # BLD: 0.03 K/UL (ref 0–0.12)
BILIRUB SERPL-MCNC: 0.3 MG/DL (ref 0.1–1.5)
BILIRUB UR QL STRIP.AUTO: NEGATIVE
BUN SERPL-MCNC: 12 MG/DL (ref 8–22)
CALCIUM ALBUM COR SERPL-MCNC: 8.5 MG/DL (ref 8.5–10.5)
CALCIUM SERPL-MCNC: 8.7 MG/DL (ref 8.5–10.5)
CHLORIDE SERPL-SCNC: 105 MMOL/L (ref 96–112)
CO2 SERPL-SCNC: 23 MMOL/L (ref 20–33)
COLOR UR: YELLOW
CREAT SERPL-MCNC: 0.77 MG/DL (ref 0.5–1.4)
EKG IMPRESSION: NORMAL
EOSINOPHIL # BLD AUTO: 0 K/UL (ref 0–0.51)
EOSINOPHIL NFR BLD: 0 % (ref 0–6.9)
ERYTHROCYTE [DISTWIDTH] IN BLOOD BY AUTOMATED COUNT: 39.8 FL (ref 35.9–50)
FLUAV RNA SPEC QL NAA+PROBE: POSITIVE
FLUBV RNA SPEC QL NAA+PROBE: NEGATIVE
GFR SERPLBLD CREATININE-BSD FMLA CKD-EPI: 110 ML/MIN/1.73 M 2
GLOBULIN SER CALC-MCNC: 2.8 G/DL (ref 1.9–3.5)
GLUCOSE SERPL-MCNC: 96 MG/DL (ref 65–99)
GLUCOSE UR STRIP.AUTO-MCNC: NEGATIVE MG/DL
HCT VFR BLD AUTO: 39.4 % (ref 37–47)
HGB BLD-MCNC: 13.4 G/DL (ref 12–16)
IMM GRANULOCYTES # BLD AUTO: 0.02 K/UL (ref 0–0.11)
IMM GRANULOCYTES NFR BLD AUTO: 0.4 % (ref 0–0.9)
KETONES UR STRIP.AUTO-MCNC: NEGATIVE MG/DL
LACTATE SERPL-SCNC: 1 MMOL/L (ref 0.5–2)
LEUKOCYTE ESTERASE UR QL STRIP.AUTO: NEGATIVE
LYMPHOCYTES # BLD AUTO: 1.13 K/UL (ref 1–4.8)
LYMPHOCYTES NFR BLD: 24 % (ref 22–41)
MCH RBC QN AUTO: 29.5 PG (ref 27–33)
MCHC RBC AUTO-ENTMCNC: 34 G/DL (ref 33.6–35)
MCV RBC AUTO: 86.8 FL (ref 81.4–97.8)
MICRO URNS: NORMAL
MONOCYTES # BLD AUTO: 0.47 K/UL (ref 0–0.85)
MONOCYTES NFR BLD AUTO: 10 % (ref 0–13.4)
NEUTROPHILS # BLD AUTO: 3.06 K/UL (ref 2–7.15)
NEUTROPHILS NFR BLD: 65 % (ref 44–72)
NITRITE UR QL STRIP.AUTO: NEGATIVE
NRBC # BLD AUTO: 0 K/UL
NRBC BLD-RTO: 0 /100 WBC
PH UR STRIP.AUTO: 5.5 [PH] (ref 5–8)
PLATELET # BLD AUTO: 202 K/UL (ref 164–446)
PMV BLD AUTO: 12 FL (ref 9–12.9)
POTASSIUM SERPL-SCNC: 3.8 MMOL/L (ref 3.6–5.5)
PROT SERPL-MCNC: 7.1 G/DL (ref 6–8.2)
PROT UR QL STRIP: NEGATIVE MG/DL
RBC # BLD AUTO: 4.54 M/UL (ref 4.2–5.4)
RBC UR QL AUTO: NEGATIVE
RSV RNA SPEC QL NAA+PROBE: NEGATIVE
SARS-COV-2 RNA RESP QL NAA+PROBE: NOTDETECTED
SODIUM SERPL-SCNC: 138 MMOL/L (ref 135–145)
SP GR UR STRIP.AUTO: 1.01
SPECIMEN SOURCE: ABNORMAL
UROBILINOGEN UR STRIP.AUTO-MCNC: 0.2 MG/DL
WBC # BLD AUTO: 4.7 K/UL (ref 4.8–10.8)

## 2022-12-23 PROCEDURE — 80053 COMPREHEN METABOLIC PANEL: CPT

## 2022-12-23 PROCEDURE — 81003 URINALYSIS AUTO W/O SCOPE: CPT

## 2022-12-23 PROCEDURE — 94760 N-INVAS EAR/PLS OXIMETRY 1: CPT

## 2022-12-23 PROCEDURE — 700105 HCHG RX REV CODE 258: Performed by: EMERGENCY MEDICINE

## 2022-12-23 PROCEDURE — 85025 COMPLETE CBC W/AUTO DIFF WBC: CPT

## 2022-12-23 PROCEDURE — 87086 URINE CULTURE/COLONY COUNT: CPT

## 2022-12-23 PROCEDURE — 700111 HCHG RX REV CODE 636 W/ 250 OVERRIDE (IP): Performed by: EMERGENCY MEDICINE

## 2022-12-23 PROCEDURE — 99284 EMERGENCY DEPT VISIT MOD MDM: CPT

## 2022-12-23 PROCEDURE — 96374 THER/PROPH/DIAG INJ IV PUSH: CPT

## 2022-12-23 PROCEDURE — 93005 ELECTROCARDIOGRAM TRACING: CPT | Performed by: EMERGENCY MEDICINE

## 2022-12-23 PROCEDURE — 87040 BLOOD CULTURE FOR BACTERIA: CPT

## 2022-12-23 PROCEDURE — C9803 HOPD COVID-19 SPEC COLLECT: HCPCS | Performed by: EMERGENCY MEDICINE

## 2022-12-23 PROCEDURE — 83605 ASSAY OF LACTIC ACID: CPT

## 2022-12-23 PROCEDURE — 71045 X-RAY EXAM CHEST 1 VIEW: CPT

## 2022-12-23 PROCEDURE — 36415 COLL VENOUS BLD VENIPUNCTURE: CPT

## 2022-12-23 PROCEDURE — 0241U HCHG SARS-COV-2 COVID-19 NFCT DS RESP RNA 4 TRGT MIC: CPT

## 2022-12-23 PROCEDURE — 93005 ELECTROCARDIOGRAM TRACING: CPT

## 2022-12-23 RX ORDER — SODIUM CHLORIDE, SODIUM LACTATE, POTASSIUM CHLORIDE, CALCIUM CHLORIDE 600; 310; 30; 20 MG/100ML; MG/100ML; MG/100ML; MG/100ML
INJECTION, SOLUTION INTRAVENOUS CONTINUOUS
Status: DISCONTINUED | OUTPATIENT
Start: 2022-12-23 | End: 2022-12-23 | Stop reason: HOSPADM

## 2022-12-23 RX ORDER — KETOROLAC TROMETHAMINE 30 MG/ML
15 INJECTION, SOLUTION INTRAMUSCULAR; INTRAVENOUS ONCE
Status: COMPLETED | OUTPATIENT
Start: 2022-12-23 | End: 2022-12-23

## 2022-12-23 RX ADMIN — SODIUM CHLORIDE, POTASSIUM CHLORIDE, SODIUM LACTATE AND CALCIUM CHLORIDE: 600; 310; 30; 20 INJECTION, SOLUTION INTRAVENOUS at 05:59

## 2022-12-23 RX ADMIN — KETOROLAC TROMETHAMINE 15 MG: 30 INJECTION, SOLUTION INTRAMUSCULAR at 06:00

## 2022-12-23 ASSESSMENT — ENCOUNTER SYMPTOMS
NAUSEA: 1
VOMITING: 0
HEADACHES: 1
SORE THROAT: 1
FEVER: 1
DIARRHEA: 0
COUGH: 1

## 2022-12-23 ASSESSMENT — FIBROSIS 4 INDEX: FIB4 SCORE: 0.55

## 2022-12-23 NOTE — ED NOTES
Pt ambulated to [facility] from [facility] with steady gait.  On monitor, call light in reach. Chart up for ERP.

## 2022-12-23 NOTE — ED TRIAGE NOTES
Pt reports to triage desk c/o 105 °F max fever PTA. Oral temp re-assessed as documented. EKG ordered per protocol. Triage RN notified.

## 2022-12-23 NOTE — ED TRIAGE NOTES
"Chief Complaint   Patient presents with    Flu Like Symptoms     Cough, congestions, nausea, malaise, fatigue, joint pain, body aches, headache x20 hours    Fever     x20 hours despite OTC meds and treatments; highest temp recorded at home was 105.0F temporal     Pt ambulatory to triage with SO for above complaint. Pt appears very uncomfortable and lethargic in triage and states she feels \"out of it.\" EKG done prior to triage for elevated HR.    Pt is alert/oriented and follows commands. Pt speaking in full sentences and responds appropriately to questions. No acute distress noted in triage and respirations are even and unlabored.     Pt taken to [facility] per Charge RN  "

## 2022-12-23 NOTE — ED NOTES
Pt provided with discharge instructions. Pt had no further questions. Pt ambulated to [facility]

## 2022-12-23 NOTE — ED PROVIDER NOTES
ED Provider Note    Scribed for BENITEZ Terry II* by Tomi Nunez. 12/23/2022  5:32 AM    Means of Arrival: Walk in  History obtained by: Patient  Limitations: None    CHIEF COMPLAINT  Chief Complaint   Patient presents with    Flu Like Symptoms     Cough, congestions, nausea, malaise, fatigue, joint pain, body aches, headache x20 hours    Fever     x20 hours despite OTC meds and treatments; highest temp recorded at home was 105.0F temporal       HPI  Michel Angelo is a 27 y.o. female who presents to the Emergency Department for evaluation of flu-like symptoms onset yesterday. Patient states she had a sore throat yesterday but not today. She reports generalized muscle aches, cough, congestion, nausea, malaise, and headache. She also reports fever. States she has been taking Tylenol and Ibuprofen, with last dose two hours prior to exam, without improvement of her symptoms. Denies vomiting, diarrhea, or dysuria.     REVIEW OF SYSTEMS  Review of Systems   Constitutional:  Positive for fever and malaise/fatigue.   HENT:  Positive for congestion and sore throat.    Respiratory:  Positive for cough.    Gastrointestinal:  Positive for nausea. Negative for diarrhea and vomiting.   Genitourinary:  Negative for dysuria.   Neurological:  Positive for headaches.   All other systems reviewed and are negative.  See HPI for further details.    PAST MEDICAL HISTORY   has a past medical history of Chronic constipation (4/8/2016), Dysmenorrhea (4/8/2016), Hypotension (2/21/2020), NEGATIVE HISTORY OF, and Neuritis- right scalp cutaneous x 5 days (9/22/2016).    SOCIAL HISTORY  Social History     Tobacco Use    Smoking status: Never    Smokeless tobacco: Never   Vaping Use    Vaping Use: Never used   Substance and Sexual Activity    Alcohol use: Yes     Comment: occasional    Drug use: Never    Sexual activity: Yes     Partners: Female       SURGICAL HISTORY   has a past surgical history that includes knee  "arthroscopy (Left, 05/2018); rhinoplasty (03/2019); knee arthroscopy (Right, 8/22/2019); meniscectomy, knee, medial (Right, 8/22/2019); and meniscal repair (Right, 8/22/2019).    CURRENT MEDICATIONS  Home Medications       Reviewed by Veronica Dubose R.N. (Registered Nurse) on 12/23/22 at 0511  Med List Status: Not Addressed     Medication Last Dose Status   acetaminophen (TYLENOL) 325 MG Tab  Active   fluconazole (DIFLUCAN) 150 MG tablet  Active   Homeopathic Products (ARNICA EX)  Active   linaCLOtide 145 MCG Cap  Active   melatonin 5 mg Tab  Active   metoclopramide (REGLAN) 10 MG Tab  Active   Multiple Vitamin (MULTIVITAMIN PO)  Active                    ALLERGIES  Allergies   Allergen Reactions    Compazine [Prochlorperazine] Anxiety     \"I felt like I was crawling out of my skin\"    Gluten Meal Diarrhea and Vomiting     Diarrhea      Iodine Contrast [Diagnostic X-Ray Materials] Shortness of Breath     Tight chest    Gluten Meal     Iodine        PHYSICAL EXAM  VITAL SIGNS: /65   Pulse (!) 122   Temp (!) 39.1 °C (102.3 °F) (Oral)   Resp 14   Ht 1.702 m (5' 7\")   Wt 70.9 kg (156 lb 4.9 oz)   LMP 12/10/2022 (Exact Date)   SpO2 92%   BMI 24.48 kg/m²     Pulse ox interpretation: I interpret this pulse ox as normal.  Constitutional: Alert in no apparent distress. Well-nourished, 27 year old woman, feels fatigued  HENT: No signs of trauma, Bilateral external ears normal, Nose normal.   Eyes: Pupils are equal, Conjunctiva normal, Non-icteric.   Neck: Normal range of motion, No tenderness, Supple, No stridor.   Lymphatic: No lymphadenopathy  Cardiovascular: Increased heart rate. Regular rhythm, no murmurs. Symmetric distal pulses. No cyanosis of extremities. No peripheral edema of extremities.  Thorax & Lungs: Normal breath sounds, No respiratory distress, No wheezing, No chest tenderness.   Abdomen: Bowel sounds normal, Soft, No tenderness, No masses, No pulsatile masses. No peritoneal signs.  Skin: Warm, " Dry, No erythema, No rash.   Back: No midline bony tenderness, No CVA tenderness.   Musculoskeletal: Good range of motion in all major joints. No tenderness to palpation or major deformities noted.   Neurologic: Alert , Normal motor function, Normal sensory function, No focal deficits noted.   Psychiatric: Affect normal, Judgment normal, Mood normal.     DIAGNOSTIC STUDIES / PROCEDURES    EKG Interpretation:  Interpreted by me  Results for orders placed or performed during the hospital encounter of 22   EKG (NOW)   Result Value Ref Range    Report       [facility] Emergency Dept.    Test Date:  2022  Pt Name:    JOHNY HASSAN            Department: ER  MRN:        [id number]                      Room:       [facility]  Gender:     Female                       Technician: [id number]  :        [id number]                   Requested By:ER TRIAGE PROTOCOL  Order #:    [id number]                    Reading MD: Quentin Reid II, MD    Measurements  Intervals                                Axis  Rate:       123                          P:          78  WI:         121                          QRS:        66  QRSD:       87                           T:          53  QT:         309  QTc:        442    Interpretive Statements  Sinus tachycardia  Rate 123  normal intervals  No ST elevation or depression. Normal twaves.  Impression: Sinus tachycardia  Compared to ECG 2021 19:28:38  Sinus rhythm no longer present  ST (T wave) deviation no longer present  T-wave abnormality no longer present  Q waves no longer present  E lectronically Signed On 2022 7:13:12 PST by Quentin Reid II, MD           LABS  Pertinent Labs & Imaging studies reviewed. (See chart for details)    RADIOLOGY  DX-CHEST-PORTABLE (1 VIEW)   Final Result         1.  No acute cardiopulmonary disease.        Pertinent Labs & Imaging studies reviewed. (See chart for details)    COURSE & MEDICAL DECISION MAKING  Pertinent  Labs & Imaging studies reviewed. (See chart for details)    5:32 AM This is a 27 y.o. female who presents with flu-like symptoms and the differential diagnosis includes but is not limited to viral syndrome, UTI, pneumonia. Ordered for DX chest, CBC with differential, CMP, lactic acid, blood culture, urinalysis, urine culture, CoV-2, flu a/b, and rsv by pcr, EKG to evaluate. Patient will be treated with Toradol for body aches and fever. MIVF going for tachycardia.     7:16 AM  WBC low 4.7. UA normal CXR normal CMP normal Influenza positive. Still with some tachycardia and temp still up but I think source is clear, she feels much better and agreeable with plan to go home. Unfortunately severe shortages of tamiflu, although within window will not prescribe. Benefit likely minimal as well because she is otherwise healthy and immunocompetent.      The patient will return for worsening symptoms and is stable at the time of discharge. The patient verbalizes understanding and will comply. Guidance provided on appropriate use of medications.      FINAL IMPRESSION  1. Influenza A Active         Tomi GURROLA (Scribe), am scribing for, and in the presence of, RUDOLPH Terry II.    Electronically signed by: Tomi Nunez (Susan), 12/23/2022    Quentin GURROLA II, M* personally performed the services described in this documentation, as scribed by Tomi Nunez in my presence, and it is both accurate and complete.    The note accurately reflects work and decisions made by me.  Quentin Reid II, M.D.  12/23/2022  7:19 AM    done

## 2022-12-25 LAB
BACTERIA UR CULT: NORMAL
SIGNIFICANT IND 70042: NORMAL
SITE SITE: NORMAL
SOURCE SOURCE: NORMAL

## 2022-12-28 LAB
BACTERIA BLD CULT: NORMAL
BACTERIA BLD CULT: NORMAL
SIGNIFICANT IND 70042: NORMAL
SIGNIFICANT IND 70042: NORMAL
SITE SITE: NORMAL
SITE SITE: NORMAL
SOURCE SOURCE: NORMAL
SOURCE SOURCE: NORMAL

## 2023-01-27 ENCOUNTER — HOSPITAL ENCOUNTER (EMERGENCY)
Facility: MEDICAL CENTER | Age: 28
End: 2023-01-27
Attending: EMERGENCY MEDICINE
Payer: COMMERCIAL

## 2023-01-27 ENCOUNTER — EH NON-PROVIDER (OUTPATIENT)
Dept: OCCUPATIONAL MEDICINE | Facility: CLINIC | Age: 28
End: 2023-01-27
Payer: COMMERCIAL

## 2023-01-27 VITALS
HEIGHT: 67 IN | SYSTOLIC BLOOD PRESSURE: 114 MMHG | WEIGHT: 155 LBS | DIASTOLIC BLOOD PRESSURE: 72 MMHG | BODY MASS INDEX: 24.33 KG/M2 | OXYGEN SATURATION: 99 % | HEART RATE: 88 BPM | TEMPERATURE: 97.6 F | RESPIRATION RATE: 16 BRPM
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
## 2023-01-27 DIAGNOSIS — Z02.1 PRE-EMPLOYMENT DRUG SCREENING: ICD-10-CM

## 2023-01-27 DIAGNOSIS — Z02.83 ENCOUNTER FOR DRUG SCREENING: ICD-10-CM

## 2023-01-27 LAB
ALBUMIN SERPL BCP-MCNC: 4.7 G/DL (ref 3.2–4.9)
ALBUMIN/GLOB SERPL: 1.6 G/DL
ALP SERPL-CCNC: 50 U/L (ref 30–99)
ALT SERPL-CCNC: 21 U/L (ref 2–50)
ANION GAP SERPL CALC-SCNC: 10 MMOL/L (ref 7–16)
AST SERPL-CCNC: 22 U/L (ref 12–45)
BILIRUB SERPL-MCNC: 0.2 MG/DL (ref 0.1–1.5)
BUN SERPL-MCNC: 13 MG/DL (ref 8–22)
CALCIUM SERPL-MCNC: 9.5 MG/DL (ref 8.5–10.5)
CHLORIDE SERPL-SCNC: 103 MMOL/L (ref 96–112)
CO2 SERPL-SCNC: 25 MMOL/L (ref 20–33)
CREAT SERPL-MCNC: 0.84 MG/DL (ref 0.5–1.4)
ERYTHROCYTE [DISTWIDTH] IN BLOOD BY AUTOMATED COUNT: 40 FL (ref 35.9–50)
GFR SERPLBLD CREATININE-BSD FMLA CKD-EPI: 99 ML/MIN/1.73 M 2
GLOBULIN SER CALC-MCNC: 2.9 G/DL (ref 1.9–3.5)
GLUCOSE SERPL-MCNC: 80 MG/DL (ref 65–99)
HBV CORE AB SERPL QL IA: NONREACTIVE
HBV SURFACE AB SERPL IA-ACNC: <3.5 MIU/ML (ref 0–10)
HBV SURFACE AG SER QL: ABNORMAL
HCT VFR BLD AUTO: 42.1 % (ref 37–47)
HCV AB SER QL: ABNORMAL
HGB BLD-MCNC: 13.9 G/DL (ref 12–16)
HIV 1+2 AB+HIV1 P24 AG SERPL QL IA: ABNORMAL
MCH RBC QN AUTO: 28.9 PG (ref 27–33)
MCHC RBC AUTO-ENTMCNC: 33 G/DL (ref 33.6–35)
MCV RBC AUTO: 87.5 FL (ref 81.4–97.8)
PLATELET # BLD AUTO: 279 K/UL (ref 164–446)
PMV BLD AUTO: 11.3 FL (ref 9–12.9)
POTASSIUM SERPL-SCNC: 3.8 MMOL/L (ref 3.6–5.5)
PROT SERPL-MCNC: 7.6 G/DL (ref 6–8.2)
RBC # BLD AUTO: 4.81 M/UL (ref 4.2–5.4)
SODIUM SERPL-SCNC: 138 MMOL/L (ref 135–145)
WBC # BLD AUTO: 7.7 K/UL (ref 4.8–10.8)

## 2023-01-27 PROCEDURE — 80053 COMPREHEN METABOLIC PANEL: CPT

## 2023-01-27 PROCEDURE — 99026 IN-HOSPITAL ON CALL SERVICE: CPT | Performed by: PREVENTIVE MEDICINE

## 2023-01-27 PROCEDURE — 86704 HEP B CORE ANTIBODY TOTAL: CPT

## 2023-01-27 PROCEDURE — 82075 ASSAY OF BREATH ETHANOL: CPT | Performed by: PREVENTIVE MEDICINE

## 2023-01-27 PROCEDURE — 36415 COLL VENOUS BLD VENIPUNCTURE: CPT

## 2023-01-27 PROCEDURE — 86706 HEP B SURFACE ANTIBODY: CPT

## 2023-01-27 PROCEDURE — 99283 EMERGENCY DEPT VISIT LOW MDM: CPT

## 2023-01-27 PROCEDURE — 87340 HEPATITIS B SURFACE AG IA: CPT

## 2023-01-27 PROCEDURE — 85027 COMPLETE CBC AUTOMATED: CPT

## 2023-01-27 PROCEDURE — 87389 HIV-1 AG W/HIV-1&-2 AB AG IA: CPT

## 2023-01-27 PROCEDURE — 86803 HEPATITIS C AB TEST: CPT

## 2023-01-27 PROCEDURE — 80305 DRUG TEST PRSMV DIR OPT OBS: CPT | Performed by: PREVENTIVE MEDICINE

## 2023-01-27 ASSESSMENT — FIBROSIS 4 INDEX: FIB4 SCORE: 0.73

## 2023-01-27 NOTE — LETTER
"  FORM C-4:  EMPLOYEE’S CLAIM FOR COMPENSATION/ REPORT OF INITIAL TREATMENT  EMPLOYEE’S CLAIM - PROVIDE ALL INFORMATION REQUESTED   First Name Michel Last Name Gi Birthdate 1995  Sex female Claim Number   Home Address [address] [place] [address]             Zip [address]                                   Age  27 y.o. 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 (if applicable) Starting an IV on pt.    How did this injury or occupational disease occur? Be specific and answer in detail. Use additional sheet if necessary)  I was starting an ultrasound IV and when I took the needle out of the patient's arm, I scraped/poked my left thumb. I felt the poke and noticed a \"poke\" rosario. I washed my hands after.   If you believe that you have an occupational disease, when did you first have knowledge of the disability and it relationship to your employment? N/A Witnesses to the Accident  N/A   Nature of Injury or Occupational Disease  Workers' Compensation Part(s) of Body Injured or Affected  Thumb (L), N/A, N/A    I CERTIFY THAT THE ABOVE IS TRUE AND CORRECT TO THE BEST OF MY KNOWLEDGE AND THAT I HAVE PROVIDED THIS INFORMATION IN ORDER TO OBTAIN " THE [place]).  I HEREBY AUTHORIZE ANY PHYSICIAN, CHIROPRACTOR, SURGEON, PRACTITIONER, OR OTHER PERSON, ANY HOSPITAL, INCLUDING [facility] OR [facility], ANY MEDICAL SERVICE ORGANIZATION, ANY INSURANCE COMPANY, OR OTHER INSTITUTION OR ORGANIZATION TO RELEASE TO EACH OTHER, ANY MEDICAL OR OTHER INFORMATION, INCLUDING BENEFITS PAID OR PAYABLE, PERTINENT TO THIS INJURY OR DISEASE, EXCEPT INFORMATION RELATIVE TO DIAGNOSIS, TREATMENT AND/OR COUNSELING FOR AIDS, PSYCHOLOGICAL CONDITIONS, ALCOHOL OR CONTROLLED SUBSTANCES, FOR WHICH I MUST GIVE SPECIFIC AUTHORIZATION.  A PHOTOSTAT OF THIS AUTHORIZATION SHALL BE AS VALID AS THE ORIGINAL.  Date                                      Place                                                                             Employee’s Signature   THIS REPORT MUST BE COMPLETED AND MAILED WITHIN 3 WORKING DAYS OF TREATMENT   Place [facility], EMERGENCY DEPT                       Name of Facility [facility]   Date  1/27/2023 Diagnosis  No diagnosis found. Is there evidence the injured employee was under the influence of alcohol and/or another controlled substance at the time of accident?   Hour  5:46 PM Description of Injury or Disease       Treatment     Have you advised the patient to remain off work five days or more?             X-Ray Findings    If Yes   From Date    To Date      From information given by the employee, together with medical evidence, can you directly connect this injury or occupational disease as job incurred?   If No, is employee capable of: Full Duty    Modified Duty      Is additional medical care by a physician indicated?   If Modified Duty, Specify any Limitations / Restrictions       Do you know of any previous injury or disease contributing to this condition or occupational disease?     "  Date 1/27/2023 Print Doctor’s Name Jian Boone GALILEO certify the employer’s copy of this form was mailed on:   Address [address] [place] [address]  [place] [address] [phone] INSURER’S USE ONLY   Provider’s Tax ID Number   Telephone Dept: [phone]    Doctor’s Signature   Degree        Form C-4 (rev.10/07)                                                                         BRIEF DESCRIPTION OF RIGHTS AND BENEFITS  (Pursuant to [place] 616C.050)    Notice of Injury or Occupational Disease (Incident Report Form C-1): If an injury or occupational disease (OD) arises out of and in the course of employment, you must provide written notice to your employer as soon as practicable, but no later than 7 days after the accident or OD. Your employer shall maintain a sufficient supply of the required forms.    Claim for Compensation (Form C-4): If medical treatment is sought, the form C-4 is available at the place of initial treatment. A completed \"Claim for Compensation\" (Form C-4) must be filed within 90 days after an accident or OD. The treating physician or chiropractor must, within 3 working days after treatment, complete and mail to the employer, the employer's insurer and third-party , the Claim for Compensation.    Medical Treatment: If you require medical treatment for your on-the-job injury or OD, you may be required to select a physician or chiropractor from a list provided by your workers’ compensation insurer, if it has contracted with an Organization for Managed Care (MCO) or Preferred Provider Organization (PPO) or providers of health care. If your employer has not entered into a contract with an MCO or PPO, you may select a physician or chiropractor from the Panel of Physicians and Chiropractors. Any medical costs related to your industrial injury or OD will be paid by your insurer.    Temporary Total Disability (TTD): If your doctor has certified that you are unable to work for a period of at " least 5 consecutive days, or 5 cumulative days in a 20-day period, or places restrictions on you that your employer does not accommodate, you may be entitled to TTD compensation.    Temporary Partial Disability (TPD): If the wage you receive upon reemployment is less than the compensation for TTD to which you are entitled, the insurer may be required to pay you TPD compensation to make up the difference. TPD can only be paid for a maximum of 24 months.    Permanent Partial Disability (PPD): When your medical condition is stable and there is an indication of a PPD as a result of your injury or OD, within 30 days, your insurer must arrange for an evaluation by a rating physician or chiropractor to determine the degree of your PPD. The amount of your PPD award depends on the date of injury, the results of the PPD evaluation, your age and wage.    Permanent Total Disability (PTD): If you are medically certified by a treating physician or chiropractor as permanently and totally disabled and have been granted a PTD status by your insurer, you are entitled to receive monthly benefits not to exceed 66 2/3% of your average monthly wage. The amount of your PTD payments is subject to reduction if you previously received a lump-sum PPD award.    Vocational Rehabilitation Services: You may be eligible for vocational rehabilitation services if you are unable to return to the job due to a permanent physical impairment or permanent restrictions as a result of your injury or occupational disease.    Transportation and Per Martita Reimbursement: You may be eligible for travel expenses and per martita associated with medical treatment.    Reopening: You may be able to reopen your claim if your condition worsens after claim closure.     Appeal Process: If you disagree with a written determination issued by the insurer or the insurer does not respond to your request, you may appeal to the Department of Administration, , by  following the instructions contained in your determination letter. You must appeal the determination within 70 days from the date of the determination letter at [address], [address], [place], [place] [address], or [address], [address], [place], [place] [address]. If you disagree with the  decision, you may appeal to the Department of Administration, . You must file your appeal within 30 days from the date of the  decision letter at [address], [address], [place], [place] [address], or [address], [address], [place], [place] [address]. If you disagree with a decision of an , you may file a petition for judicial review with the District Court. You must do so within 30 days of the Appeal Officer’s decision. You may be represented by an  at your own expense or you may contact the [facility] for possible representation.    [place]  for In[place]ured Workers ([place]IW): If you disagree with a  decision, you may request that NAIW represent you without charge at an  Hearing. For information regarding denial of benefits, you may contact the [facility] at: [address], [address], [place] [address], [phone], or [address], [address], [place] [address], [phone]    To File a Complaint with the Division: If you wish to file a complaint with the  of the Division of Industrial Relations (DIR),  please contact the Workers’ Compensation Section, [address], [address], [place], [place] [address], telephone [phone], or [address], [place], [place] [address], telephone [phone].    For assistance with Workers’ Compensation Issues: You may contact the [facility] Office for Consumer Health Assistance, [address], [place], [place] [address], Toll Free 1-589.745.9740, Web site:  http://[facility].[place].gov/[place]/HEMAL E-mail: [email or web address]  D-2 (rev. 10/20)              __________________________________________________________________                                    _________________            Employee Name / Signature                                                                                                                            Date

## 2023-01-27 NOTE — ED TRIAGE NOTES
"Chief Complaint   Patient presents with    Other     Pt was stuck by a needle while at work.  Needle punctured skin.      /79   Pulse 94   Temp 36.4 °C (97.5 °F) (Temporal)   Resp 16   Ht 1.702 m (5' 7\")   Wt 70.3 kg (155 lb)   SpO2 99%   BMI 24.28 kg/m²     Pt roomed immediately.    "

## 2023-01-27 NOTE — LETTER
"  FORM C-4:  EMPLOYEE’S CLAIM FOR COMPENSATION/ REPORT OF INITIAL TREATMENT  EMPLOYEE’S CLAIM - PROVIDE ALL INFORMATION REQUESTED   First Name Michel Last Name Gi Birthdate 1995  Sex female Claim Number   Home Address [address] [place] [address]             Zip [address]                                   Age  27 y.o. 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] Zip [address]   Date of Injury  1/27/2023       Hour of Injury  3:23 PM Date Employer Notified  1/27/2023 Last Day of Work after Injury or Occupational Disease  1/27/2023 Supervisor to Whom Injury Reported  Girma (Charge) + Isidra (Supervisor)   Address or Location of Accident (if applicable) Work [1]   What were you doing at the time of accident? (if applicable) Starting an IV on pt.    How did this injury or occupational disease occur? Be specific and answer in detail. Use additional sheet if necessary)  I was starting an ultrasound IV and when I took the needle out of the patient's arm, I scraped/poked my left thumb. I felt the poke and noticed a \"poke\" rosario. I washed my hands after.   If you believe that you have an occupational disease, when did you first have knowledge of the disability and it relationship to your employment? N/A Witnesses to the Accident  N/A   Nature of Injury or Occupational Disease  Workers' Compensation Part(s) of Body Injured or Affected  Thumb (L), N/A, N/A    I CERTIFY THAT THE ABOVE IS TRUE AND CORRECT TO THE BEST OF MY KNOWLEDGE AND THAT I HAVE PROVIDED THIS INFORMATION IN ORDER TO " OBTAIN THE [place], [place] 617 [place]).  I HEREBY AUTHORIZE ANY PHYSICIAN, CHIROPRACTOR, SURGEON, PRACTITIONER, OR OTHER PERSON, ANY HOSPITAL, INCLUDING [facility] OR [facility] HOSPITAL, ANY MEDICAL SERVICE ORGANIZATION, ANY INSURANCE COMPANY, OR OTHER INSTITUTION OR ORGANIZATION TO RELEASE TO EACH OTHER, ANY MEDICAL OR OTHER INFORMATION, INCLUDING BENEFITS PAID OR PAYABLE, PERTINENT TO THIS INJURY OR DISEASE, EXCEPT INFORMATION RELATIVE TO DIAGNOSIS, TREATMENT AND/OR COUNSELING FOR AIDS, PSYCHOLOGICAL CONDITIONS, ALCOHOL OR CONTROLLED SUBSTANCES, FOR WHICH I MUST GIVE SPECIFIC AUTHORIZATION.  A PHOTOSTAT OF THIS AUTHORIZATION SHALL BE AS VALID AS THE ORIGINAL.  Date   1/27/2023     Place   [facility]  Employee’s Signature   THIS REPORT MUST BE COMPLETED AND MAILED WITHIN 3 WORKING DAYS OF TREATMENT   [facility], EMERGENCY DEPT                       [facility]   Date  1/27/2023 Diagnosis  (W46.0XXA) Accidental hypodermic needlestick injury Is there evidence the injured employee was under the influence of alcohol and/or another controlled substance at the time of accident?   Hour  5:49 PM Description of Injury or Disease  Accidental hypodermic needlestick injury No   Treatment     Have you advised the patient to remain off work five days or more?         No   X-Ray Findings    If Yes   From Date    To Date      From information given by the employee, together with medical evidence, can you directly connect this injury or occupational disease as job incurred? Yes If No, is employee capable of: Full Duty  Yes Modified Duty      Is additional medical care by a physician indicated? Yes If Modified Duty, Specify any Limitations / Restrictions       Do you know of any previous injury or disease contributing to this condition or occupational disease? No   "  Date 1/27/2023 Print Doctor’s Name Jian Boone I certify the employer’s copy of this form was mailed on:   Address [address] [place] [address]  [place] [address] [phone] INSURER’S USE ONLY   Provider’s Tax ID Number  [id number] Telephone Dept: [phone]    Doctor’s Signature e-JIAN Chapin M.D. Degree  MD      Form C-4 (rev.10/07)                                                                         BRIEF DESCRIPTION OF RIGHTS AND BENEFITS  ([place] to [place] 616C.050)    Notice of Injury or Occupational Disease (Incident Report Form C-1): If an injury or occupational disease (OD) arises out of and in the course of employment, you must provide written notice to your employer as soon as practicable, but no later than 7 days after the accident or OD. Your employer shall maintain a sufficient supply of the required forms.    Claim for Compensation (Form C-4): If medical treatment is sought, the form C-4 is available at the place of initial treatment. A completed \"Claim for Compensation\" (Form C-4) must be filed within 90 days after an accident or OD. The treating physician or chiropractor must, within 3 working days after treatment, complete and mail to the employer, the employer's insurer and third-party , the Claim for Compensation.    Medical Treatment: If you require medical treatment for your on-the-job injury or OD, you may be required to select a physician or chiropractor from a list provided by your workers’ compensation insurer, if it has contracted with an Organization for Managed Care (MCO) or Preferred Provider Organization (PPO) or providers of health care. If your employer has not entered into a contract with an MCO or PPO, you may select a physician or chiropractor from the Panel of Physicians and Chiropractors. Any medical costs related to your industrial injury or OD will be paid by your insurer.    Temporary Total Disability (TTD): If your doctor has certified that you " are unable to work for a period of at least 5 consecutive days, or 5 cumulative days in a 20-day period, or places restrictions on you that your employer does not accommodate, you may be entitled to TTD compensation.    Temporary Partial Disability (TPD): If the wage you receive upon reemployment is less than the compensation for TTD to which you are entitled, the insurer may be required to pay you TPD compensation to make up the difference. TPD can only be paid for a maximum of 24 months.    Permanent Partial Disability (PPD): When your medical condition is stable and there is an indication of a PPD as a result of your injury or OD, within 30 days, your insurer must arrange for an evaluation by a rating physician or chiropractor to determine the degree of your PPD. The amount of your PPD award depends on the date of injury, the results of the PPD evaluation, your age and wage.    Permanent Total Disability (PTD): If you are medically certified by a treating physician or chiropractor as permanently and totally disabled and have been granted a PTD status by your insurer, you are entitled to receive monthly benefits not to exceed 66 2/3% of your average monthly wage. The amount of your PTD payments is subject to reduction if you previously received a lump-sum PPD award.    Vocational Rehabilitation Services: You may be eligible for vocational rehabilitation services if you are unable to return to the job due to a permanent physical impairment or permanent restrictions as a result of your injury or occupational disease.    Transportation and Per Martita Reimbursement: You may be eligible for travel expenses and per martita associated with medical treatment.    Reopening: You may be able to reopen your claim if your condition worsens after claim closure.     Appeal Process: If you disagree with a written determination issued by the insurer or the insurer does not respond to your request, you may appeal to the Department of  Administration, , by following the instructions contained in your determination letter. You must appeal the determination within 70 days from the date of the determination letter at [address], [address], [place], [place] [address], or [address], [place], [place] [address]. If you disagree with the  decision, you may appeal to the Department of Administration, . You must file your appeal within 30 days from the date of the  decision letter at [address], [address], [place], [place] [address], or [address], [address], [place], [place] [address]. If you disagree with a decision of an , you may file a petition for judicial review with the District Court. You must do so within 30 days of the Appeal Officer’s decision. You may be represented by an  at your own expense or you may contact the [facility] for possible representation.    [place]  for [place] Workers ([place]): If you disagree with a  decision, you may request that NAIW represent you without charge at an  Hearing. For information regarding denial of benefits, you may contact the [facility] at: [address], [address], [place] [address], [phone], or [address], [address], [place] [address], [phone]    To File a Complaint with the Division: If you wish to file a complaint with the  of the Division of Industrial Relations (DIR),  please contact the Workers’ Compensation Section, [address], [address], [place], [place] [address], telephone [phone], or [address], [place], [place] [address], telephone [phone].    For assistance with Workers’ Compensation Issues: You may contact the [facility] Office for Consumer Health Assistance, [address], [place], [place] [address], Toll Free  6-467-842-2351, Web site: http://[facility].[place].gov/Programs/HEMAL E-mail: [email or web address]  D-2 (rev. 10/20)              __________________________________________________________________                                    _1/27/2023___            Employee Name / Signature                                                                                                                            Date

## 2023-01-28 NOTE — ED NOTES
Pt ambulated to room with steady gait. Up for ERP to see.     Pt was working and doing an IV on a patient. When removing the needle, pt poked herself in the left thumb. Source patient is identified and ERP/RN for that patient has been notified.

## 2023-01-28 NOTE — ED NOTES
Discharge education provided. Discharge paperwork signed by pt. All questions answered. All belongings with pt. Pt ambulated to purple pod (back to work) unassisted with steady gait.

## 2023-01-28 NOTE — ED PROVIDER NOTES
ED Provider Note    CHIEF COMPLAINT  Chief Complaint   Patient presents with    Other     Pt was stuck by a needle while at work.  Needle punctured skin.          HPI/ROS    Michel Angelo is a 27 y.o. female who presents with a needlestick to the palmar aspect of the left thumb.  The patient was placed in a catheter in a patient with a Angiocath needle stuck her in the left thumb after did puncture through into the patient's venous system.  The patient has a very small puncture wound to the thumb with no active bleeding.  She is otherwise healthy.  The source patient is known and is currently being tested.    PAST MEDICAL HISTORY   has a past medical history of Chronic constipation (4/8/2016), Dysmenorrhea (4/8/2016), Hypotension (2/21/2020), NEGATIVE HISTORY OF, and Neuritis- right scalp cutaneous x 5 days (9/22/2016).    SURGICAL HISTORY   has a past surgical history that includes knee arthroscopy (Left, 05/2018); rhinoplasty (03/2019); knee arthroscopy (Right, 8/22/2019); meniscectomy, knee, medial (Right, 8/22/2019); and meniscal repair (Right, 8/22/2019).    FAMILY HISTORY  Family History   Problem Relation Age of Onset    Hyperlipidemia Mother     Hyperlipidemia Father     Cancer Brother 14        adenocarcinoma- lung, carcinoid- appendix    Genitourinary () Problems Other         cousin    Diabetes Maternal Grandmother     Breast Cancer Maternal Grandmother         postmenopausal       SOCIAL HISTORY  Social History     Tobacco Use    Smoking status: Never    Smokeless tobacco: Never   Vaping Use    Vaping Use: Never used   Substance and Sexual Activity    Alcohol use: Yes     Comment: occasional    Drug use: Never    Sexual activity: Yes     Partners: Female       CURRENT MEDICATIONS  Home Medications       Reviewed by Maria Dolores Jung R.N. (Registered Nurse) on 01/27/23 at 1553  Med List Status: Not Addressed     Medication Last Dose Status   acetaminophen (TYLENOL) 325 MG Tab  Active  "  fluconazole (DIFLUCAN) 150 MG tablet  Active   Homeopathic Products (ARNICA EX)  Active   linaCLOtide 145 MCG Cap  Active   melatonin 5 mg Tab  Active   metoclopramide (REGLAN) 10 MG Tab  Active   Multiple Vitamin (MULTIVITAMIN PO)  Active                    ALLERGIES  Allergies   Allergen Reactions    Compazine [Prochlorperazine] Anxiety     \"I felt like I was crawling out of my skin\"    Gluten Meal Diarrhea and Vomiting     Diarrhea      Iodine Contrast [Diagnostic X-Ray Materials] Shortness of Breath     Tight chest    Gluten Meal     Iodine        PHYSICAL EXAM  VITAL SIGNS: /79   Pulse 94   Temp 36.4 °C (97.5 °F) (Temporal)   Resp 16   Ht 1.702 m (5' 7\")   Wt 70.3 kg (155 lb)   SpO2 99%   BMI 24.28 kg/m²    In general the patient does not appear toxic    Extremities the patient has a very small puncture wound to the palmar aspect of the distal left first phalanx    Skin the puncture wound described above    Neurovascular examination is grossly intact to the left hand    DIAGNOSTIC STUDIES   Results for orders placed or performed during the hospital encounter of 01/27/23   BLOOD AND BODY FLUID EXPOSURE (EXPOSED- SOURCE PATIENT POS OR UNKNOWN)   Result Value Ref Range    HIV Ag/Ab Combo Assay Non-Reactive Non Reactive    Hepatitis C Antibody Non-Reactive Non-Reactive    Hepatitis B Surface Antigen Non-Reactive Non-Reactive    Hep B Surface Antibody Quant <3.50 0.00 - 10.00 mIU/mL    Hepatitis B Core Ab, Total NonReactive Non-Reactive    WBC 7.7 4.8 - 10.8 K/uL    RBC 4.81 4.20 - 5.40 M/uL    Hemoglobin 13.9 12.0 - 16.0 g/dL    Hematocrit 42.1 37.0 - 47.0 %    MCV 87.5 81.4 - 97.8 fL    MCH 28.9 27.0 - 33.0 pg    MCHC 33.0 (L) 33.6 - 35.0 g/dL    RDW 40.0 35.9 - 50.0 fL    Platelet Count 279 164 - 446 K/uL    MPV 11.3 9.0 - 12.9 fL    Sodium 138 135 - 145 mmol/L    Potassium 3.8 3.6 - 5.5 mmol/L    Chloride 103 96 - 112 mmol/L    Co2 25 20 - 33 mmol/L    Anion Gap 10.0 7.0 - 16.0    Glucose 80 65 - " 99 mg/dL    Bun 13 8 - 22 mg/dL    Creatinine 0.84 0.50 - 1.40 mg/dL    Calcium 9.5 8.5 - 10.5 mg/dL    AST(SGOT) 22 12 - 45 U/L    ALT(SGPT) 21 2 - 50 U/L    Alkaline Phosphatase 50 30 - 99 U/L    Total Bilirubin 0.2 0.1 - 1.5 mg/dL    Albumin 4.7 3.2 - 4.9 g/dL    Total Protein 7.6 6.0 - 8.2 g/dL    Globulin 2.9 1.9 - 3.5 g/dL    A-G Ratio 1.6 g/dL   ESTIMATED GFR   Result Value Ref Range    GFR (CKD-EPI) 99 >60 mL/min/1.73 m 2       COURSE & MEDICAL DECISION MAKING    This is a healthy 27-year-old female who presents with an inverted needlestick to the left thumb.  This was with an 18-gauge needle.  We were able to test the source patient and testing came back negative.  Therefore the patient will not require further treatment.  Baseline labs and infectious disease tests were ordered on the patient.  She will be discharged with instructions to follow-up with [facility] occupational health for any signs of infection or further concern.    FINAL DIAGNOSIS  Inadvertent needlestick to the left thumb    Disposition  The patient will be discharged in stable condition       Electronically signed by: Jian Boone M.D., 1/27/2023 4:10 PM

## 2023-01-30 LAB
AMP AMPHETAMINE: NORMAL
BAR BARBITURATES: NORMAL
BREATH ALCOHOL COMMENT: NORMAL
BZO BENZODIAZEPINES: NORMAL
COC COCAINE: NORMAL
INT CON NEG: NORMAL
INT CON POS: NORMAL
MDMA ECSTASY: NORMAL
MET METHAMPHETAMINES: NORMAL
MTD METHADONE: NORMAL
OPI OPIATES: NORMAL
OXY OXYCODONE: NORMAL
PCP PHENCYCLIDINE: NORMAL
POC BREATHALIZER: 0 PERCENT (ref 0–0.01)
POC URINE DRUG SCREEN OCDRS: NORMAL
THC: NORMAL

## 2023-03-21 ENCOUNTER — OFFICE VISIT (OUTPATIENT)
Dept: MEDICAL GROUP | Facility: IMAGING CENTER | Age: 28
End: 2023-03-21
Payer: COMMERCIAL

## 2023-03-21 VITALS
WEIGHT: 157 LBS | TEMPERATURE: 98.3 F | SYSTOLIC BLOOD PRESSURE: 110 MMHG | BODY MASS INDEX: 24.64 KG/M2 | RESPIRATION RATE: 16 BRPM | OXYGEN SATURATION: 98 % | HEART RATE: 84 BPM | DIASTOLIC BLOOD PRESSURE: 60 MMHG | HEIGHT: 67 IN

## 2023-03-21 DIAGNOSIS — F41.9 ANXIETY: ICD-10-CM

## 2023-03-21 DIAGNOSIS — H53.9 VISUAL CHANGES: ICD-10-CM

## 2023-03-21 DIAGNOSIS — R51.9 PERSISTENT HEADACHES: ICD-10-CM

## 2023-03-21 PROCEDURE — 99214 OFFICE O/P EST MOD 30 MIN: CPT | Performed by: CLINICAL NURSE SPECIALIST

## 2023-03-21 RX ORDER — IBUPROFEN 800 MG/1
800 TABLET ORAL EVERY 8 HOURS PRN
Qty: 30 TABLET | Refills: 1 | Status: SHIPPED | OUTPATIENT
Start: 2023-03-21 | End: 2023-09-29

## 2023-03-21 RX ORDER — LORAZEPAM 0.5 MG/1
0.5 TABLET ORAL
Qty: 2 TABLET | Refills: 0 | Status: SHIPPED | OUTPATIENT
Start: 2023-03-21 | End: 2023-04-03 | Stop reason: SDUPTHER

## 2023-03-21 ASSESSMENT — PATIENT HEALTH QUESTIONNAIRE - PHQ9: CLINICAL INTERPRETATION OF PHQ2 SCORE: 0

## 2023-03-21 ASSESSMENT — FIBROSIS 4 INDEX: FIB4 SCORE: 0.43

## 2023-03-21 ASSESSMENT — PAIN SCALES - GENERAL: PAINLEVEL: 4=SLIGHT-MODERATE PAIN

## 2023-03-21 NOTE — ASSESSMENT & PLAN NOTE
Headaches started 3-4 weeks ago.  They were alleviated with Tylenol but then 2 weeks ago she woke up with a migraine, frontal, with vomiting.  Now she has a headache above her eyebrow and behind her eye, pulsing 10/10 yesterday.  Multiple head injuries over the years with concussion.  Ibuprofen and Tylenol did not help. Now 4/10.  Depth perception and night vision worsening over the last couple of months.  Sister has migraines and all wear glasses.  No fevers. No worsening with position change.  Not waking at night. No hearing changes but has chronic tinnitus.  No congestion.  Massage did not make a difference.

## 2023-03-21 NOTE — PROGRESS NOTES
"Subjective     Michel Angelo is a 27 y.o. female who presents with Headache (X2weeks, with blurry vision. )            HPI  Persistent headaches  Headaches started 3-4 weeks ago.  They were alleviated with Tylenol but then 2 weeks ago she woke up with a migraine, frontal, with vomiting.  Now she has a headache above her eyebrow and behind her eye, pulsing 10/10 yesterday.  Multiple head injuries over the years with concussion.  Ibuprofen and Tylenol did not help. Now 4/10.  Depth perception and night vision worsening over the last couple of months.  Sister has migraines and all wear glasses.  No fevers. No worsening with position change.  Not waking at night. No hearing changes but has chronic tinnitus.  No congestion.  Massage did not make a difference.      ROS  See HPI    Allergies   Allergen Reactions    Compazine [Prochlorperazine] Anxiety     \"I felt like I was crawling out of my skin\"    Gluten Meal Diarrhea and Vomiting     Diarrhea      Iodine Contrast [Diagnostic X-Ray Materials] Shortness of Breath     Tight chest    Gluten Meal     Iodine      Current Outpatient Medications on File Prior to Visit   Medication Sig Dispense Refill    fluconazole (DIFLUCAN) 150 MG tablet Take one tablet orally for yeast infection, if symptoms persist, may repeat treatment after 72 hours. (Patient not taking: Reported on 3/21/2023) 2 Tablet 0    metoclopramide (REGLAN) 10 MG Tab Take 1 Tablet by mouth 4 times a day. (Patient not taking: Reported on 11/18/2022) 10 Tablet 0    linaCLOtide 145 MCG Cap Take 1 Capsule by mouth every day. (Patient not taking: Reported on 3/21/2023) 30 Capsule 0    Multiple Vitamin (MULTIVITAMIN PO) Take 1 Tablet by mouth every evening. (Patient not taking: Reported on 11/18/2022)      melatonin 5 mg Tab Take 10 mg by mouth at bedtime as needed (sleep). (Patient not taking: Reported on 11/18/2022)      acetaminophen (TYLENOL) 325 MG Tab Take 650 mg by mouth every four hours as needed for " "Moderate Pain. (Patient not taking: Reported on 11/18/2022)      Homeopathic Products (ARNICA EX) Apply 1 Application topically 3 times a day as needed (pain). (Patient not taking: Reported on 11/18/2022)       No current facility-administered medications on file prior to visit.              Objective     /60 (BP Location: Right arm, Patient Position: Sitting, BP Cuff Size: Adult)   Pulse 84   Temp 36.8 °C (98.3 °F) (Temporal)   Resp 16   Ht 1.702 m (5' 7\")   Wt 71.2 kg (157 lb)   LMP 03/11/2023 (Within Days)   SpO2 98%   BMI 24.59 kg/m²      Physical Exam  Constitutional:       General: She is not in acute distress.     Appearance: Normal appearance. She is not ill-appearing, toxic-appearing or diaphoretic.   HENT:      Head: Normocephalic and atraumatic.   Eyes:      General: No scleral icterus.     Extraocular Movements: Extraocular movements intact.      Right eye: No nystagmus.      Left eye: No nystagmus.      Pupils: Pupils are equal, round, and reactive to light.      Comments: Peripheral visual field somewhat less extensive on right than left.  EOM intact but right eye appears slightly medially rotated when viewing object centrally at approximately 12 inches   Cardiovascular:      Rate and Rhythm: Normal rate.   Pulmonary:      Effort: Pulmonary effort is normal.   Skin:     General: Skin is warm and dry.   Neurological:      Mental Status: She is alert and oriented to person, place, and time.      Gait: Gait normal.   Psychiatric:         Mood and Affect: Mood normal.         Behavior: Behavior normal.         Thought Content: Thought content normal.         Judgment: Judgment normal.                           Assessment & Plan      1. Persistent headaches  New issue x3 weeks with two episodes of severe headaches, 2 weeks ago centrally and most recently yesterday throbbing/pulsing behind and above right eye 10/10. She reports in this time she has not had full resolution of discomfort.  " Currently she is aware of the area behind and above her right eye.  Referral to ophthalmology placed and imaging ordered.     START ibuprofen 800mg as needed. OK to take with OTC Tylenol NTE 3000mg in a day    - Referral to Ophthalmology  - ibuprofen (MOTRIN) 800 MG Tab; Take 1 Tablet by mouth every 8 hours as needed for Moderate Pain or Headache.  Dispense: 30 Tablet; Refill: 1  - MR-BRAIN-WITH & W/O; Future  - MR-ORBITS,FACE,NECK-WITH&W/O & SEQUENCES; Future    2. Visual changes  New issue x2 months with headaches and reduced depth perception.  Physical exam shows mild ocular abnormalities as stated above. Ophthalmoscope not functioning unfortunately.  Referral to ophthalmology and imaging ordered. She will also seek out optometry.  [facility] discussed.    - MR-BRAIN-WITH & W/O; Future  - MR-ORBITS,FACE,NECK-WITH&W/O & SEQUENCES; Future    3. Anxiety  Concern for anxiety during MRI. Anxiolytic ordered as below for that occurrence.    - LORazepam (ATIVAN) 0.5 MG Tab; Take 1 Tablet by mouth every 1 hour as needed for Anxiety (Take 1 hour before MRI. If inadequate effects, may take second dose) for up to 1 day.  Dispense: 2 Tablet; Refill: 0    Return if symptoms worsen or fail to improve, for With test results.

## 2023-03-31 ENCOUNTER — PATIENT MESSAGE (OUTPATIENT)
Dept: HEALTH INFORMATION MANAGEMENT | Facility: OTHER | Age: 28
End: 2023-03-31

## 2023-03-31 ENCOUNTER — DOCUMENTATION (OUTPATIENT)
Dept: HEALTH INFORMATION MANAGEMENT | Facility: OTHER | Age: 28
End: 2023-03-31
Payer: COMMERCIAL

## 2023-04-03 ENCOUNTER — PATIENT MESSAGE (OUTPATIENT)
Dept: MEDICAL GROUP | Facility: IMAGING CENTER | Age: 28
End: 2023-04-03
Payer: COMMERCIAL

## 2023-04-03 ENCOUNTER — TELEPHONE (OUTPATIENT)
Dept: HEALTH INFORMATION MANAGEMENT | Facility: OTHER | Age: 28
End: 2023-04-03
Payer: COMMERCIAL

## 2023-04-03 DIAGNOSIS — F41.9 ANXIETY: ICD-10-CM

## 2023-04-03 NOTE — PATIENT COMMUNICATION
Received request via: Patient    Was the patient seen in the last year in this department? Yes    Does the patient have an active prescription (recently filled or refills available) for medication(s) requested? No    Does the patient have prison Plus and need 100 day supply (blood pressure, diabetes and cholesterol meds only)? Patient does not have SCP     [FreeTextEntry1] : This is a 67 yo male with a PSA slowly rising to 4.72ng/ml in Sept 2019. MRI identified a 61gram prostate and a PIRADS 4 lesion. Lesion close to capsule. Biopsy positive for Inchelium 3+3 in one core and 3+4 in two other cores. All other cores negative. \par \par He presents today for radiation treatment. Patient still has the Maldonado cath in place. Will be removed on Friday after patient completes his treatment. Completed 2400/4000 cGy today.\par

## 2023-04-04 ENCOUNTER — HOSPITAL ENCOUNTER (OUTPATIENT)
Dept: RADIOLOGY | Facility: MEDICAL CENTER | Age: 28
End: 2023-04-04
Attending: CLINICAL NURSE SPECIALIST
Payer: COMMERCIAL

## 2023-04-04 DIAGNOSIS — H53.9 VISUAL CHANGES: ICD-10-CM

## 2023-04-04 DIAGNOSIS — R51.9 PERSISTENT HEADACHES: ICD-10-CM

## 2023-04-04 PROCEDURE — A9579 GAD-BASE MR CONTRAST NOS,1ML: HCPCS | Performed by: CLINICAL NURSE SPECIALIST

## 2023-04-04 PROCEDURE — 700117 HCHG RX CONTRAST REV CODE 255: Performed by: CLINICAL NURSE SPECIALIST

## 2023-04-04 PROCEDURE — 70543 MRI ORBT/FAC/NCK W/O &W/DYE: CPT

## 2023-04-04 PROCEDURE — 70553 MRI BRAIN STEM W/O & W/DYE: CPT

## 2023-04-04 RX ORDER — LORAZEPAM 0.5 MG/1
0.5 TABLET ORAL
Qty: 2 TABLET | Refills: 0 | Status: SHIPPED | OUTPATIENT
Start: 2023-04-04 | End: 2023-04-05

## 2023-04-04 RX ADMIN — GADOTERIDOL 15 ML: 279.3 INJECTION, SOLUTION INTRAVENOUS at 18:18

## 2023-04-05 DIAGNOSIS — E23.7 PITUITARY ABNORMALITY (HCC): ICD-10-CM

## 2023-04-05 DIAGNOSIS — H53.9 VISUAL CHANGES: ICD-10-CM

## 2023-04-05 DIAGNOSIS — R90.89 ABNORMAL FINDING ON MRI OF BRAIN: ICD-10-CM

## 2023-04-05 DIAGNOSIS — R51.9 PERSISTENT HEADACHES: ICD-10-CM

## 2023-04-06 DIAGNOSIS — E23.7 PITUITARY ABNORMALITY (HCC): ICD-10-CM

## 2023-04-06 DIAGNOSIS — R90.89 ABNORMAL FINDING ON MRI OF BRAIN: ICD-10-CM

## 2023-04-26 ENCOUNTER — TELEPHONE (OUTPATIENT)
Dept: HEALTH INFORMATION MANAGEMENT | Facility: OTHER | Age: 28
End: 2023-04-26
Payer: COMMERCIAL

## 2023-05-04 ENCOUNTER — OFFICE VISIT (OUTPATIENT)
Dept: MEDICAL GROUP | Facility: IMAGING CENTER | Age: 28
End: 2023-05-04
Payer: COMMERCIAL

## 2023-05-04 VITALS
BODY MASS INDEX: 24.74 KG/M2 | RESPIRATION RATE: 16 BRPM | HEIGHT: 67 IN | WEIGHT: 157.6 LBS | TEMPERATURE: 97.9 F | OXYGEN SATURATION: 98 % | HEART RATE: 91 BPM | DIASTOLIC BLOOD PRESSURE: 70 MMHG | SYSTOLIC BLOOD PRESSURE: 90 MMHG

## 2023-05-04 DIAGNOSIS — N92.6 IRREGULAR MENSES: ICD-10-CM

## 2023-05-04 DIAGNOSIS — R51.9 PERSISTENT HEADACHES: ICD-10-CM

## 2023-05-04 DIAGNOSIS — R22.0 FACIAL SWELLING: ICD-10-CM

## 2023-05-04 DIAGNOSIS — R61 CHRONIC NIGHT SWEATS: ICD-10-CM

## 2023-05-04 DIAGNOSIS — R53.82 CHRONIC FATIGUE: ICD-10-CM

## 2023-05-04 DIAGNOSIS — R59.1 LYMPHADENOPATHY: ICD-10-CM

## 2023-05-04 DIAGNOSIS — Z13.21 ENCOUNTER FOR VITAMIN DEFICIENCY SCREENING: ICD-10-CM

## 2023-05-04 DIAGNOSIS — R21 FACIAL RASH: ICD-10-CM

## 2023-05-04 DIAGNOSIS — E23.7 PITUITARY ABNORMALITY (HCC): ICD-10-CM

## 2023-05-04 DIAGNOSIS — R23.2 FLUSHING: ICD-10-CM

## 2023-05-04 PROBLEM — I95.89 CHRONIC HYPOTENSION: Status: ACTIVE | Noted: 2020-02-21

## 2023-05-04 PROCEDURE — 99214 OFFICE O/P EST MOD 30 MIN: CPT | Performed by: PHYSICIAN ASSISTANT

## 2023-05-04 ASSESSMENT — FIBROSIS 4 INDEX: FIB4 SCORE: 0.43

## 2023-05-04 ASSESSMENT — PAIN SCALES - GENERAL: PAINLEVEL: NO PAIN

## 2023-05-04 NOTE — ASSESSMENT & PLAN NOTE
Pt admits to heavy menstrual cycles that last about 4 days. Associated severe cramping. Will have a late period every couple months. Admits to severe PMS. Has seen gyn in the past, but would like to switch to a [facility] provider.

## 2023-05-04 NOTE — PROGRESS NOTES
"Subjective:     CC:   Chief Complaint   Patient presents with    Establish Care     Prior PCP- Miguel Ángel Desai     Other     Pt states she is still having headaches          HPI:   Michel presents today to discuss:    Irregular menses  Pt admits to heavy menstrual cycles that last about 4 days. Associated severe cramping. Will have a late period every couple months. Admits to severe PMS. Has seen gyn in the past, but would like to switch to a [facility] provider.     Pituitary abnormality (HCC)  Pt with a history of non-specific symptoms over the past several years including frequent headaches, facial swelling/rash/flushing, night sweats, hypotension, fatigue, episodic N/V, irregular menses, dizziness. She had an MRI brain ordered showing \"pituitary flattening\", otherwise no acute process. Saw an ophthalmologist and ruled out opthalm relation. Has an appt with endocrinology next week. No pituitary labs ordered.     Past Medical History:   Diagnosis Date    Chronic constipation 4/8/2016    Dysmenorrhea 4/8/2016    Hypotension 2/21/2020    -I suspect secondary to hypovolemia -We will run some serum studies -I have recommended that she increase from 2 of the 40 ounce flask's to 3 or 4 especially given that she works out for 1 hour a day 7 days a week -I also recommend considering electrolyte tabs either daily or every other day pending how she feels -I do not suspect inner ear issues from her recent viral illness -rto if symptoms do     NEGATIVE HISTORY OF     Neuritis- right scalp cutaneous x 5 days 9/22/2016     Family History   Problem Relation Age of Onset    Hyperlipidemia Mother     Hyperlipidemia Father     Cancer Brother 14        adenocarcinoma- lung, carcinoid- appendix    Diabetes Maternal Grandmother     Breast Cancer Maternal Grandmother         postmenopausal    Genitourinary () Problems Other         cousin     Past Surgical History:   Procedure Laterality Date    KNEE ARTHROSCOPY Right 8/22/2019    " "Procedure: ARTHROSCOPY, KNEE;  Surgeon: Vinay Moore M.D.;  Location: SURGERY [facility];  Service: Orthopedics    MENISCECTOMY, KNEE, MEDIAL Right 8/22/2019    Procedure: MENISCECTOMY, KNEE, MEDIAL - PARTIAL VERSUS;  Surgeon: Vinay Moore M.D.;  Location: SURGERY [facility];  Service: Orthopedics    MENISCAL REPAIR Right 8/22/2019    Procedure: REPAIR, MENISCUS, KNEE - PROCEED AS INDICATED;  Surgeon: Vinay Moore M.D.;  Location: SURGERY [facility];  Service: Orthopedics    RHINOPLASTY  03/2019    KNEE ARTHROSCOPY Left 05/2018     Social History     Tobacco Use    Smoking status: Never    Smokeless tobacco: Never   Vaping Use    Vaping Use: Never used   Substance Use Topics    Alcohol use: Yes     Comment: occasional    Drug use: Never     Social History     Social History Narrative    Born in [place]    Raised in [place]    Has a female partner     Current Outpatient Medications Ordered in Epic   Medication Sig Dispense Refill    ibuprofen (MOTRIN) 800 MG Tab Take 1 Tablet by mouth every 8 hours as needed for Moderate Pain or Headache. 30 Tablet 1     No current Epic-ordered facility-administered medications on file.     Compazine [prochlorperazine], Gluten meal, Iodine contrast [diagnostic x-ray materials], Gluten meal, and Iodine    PMH/PSH/FH/Social history reviewed.  Vaccinations discussed.  Previous records and labs reviewed. Discussed age appropriate anticipatory guidance.    ROS: see hpi  Gen: no fevers/chills  Pulm: no sob, no cough  CV: no chest pain, no palpitations, no edema  GI: no nausea/vomiting, no diarrhea  Skin: no rash    Objective:   Exam:  BP 90/70 (BP Location: Left arm, Patient Position: Sitting, BP Cuff Size: Adult)   Pulse 91   Temp 36.6 °C (97.9 °F) (Temporal)   Resp 16   Ht 1.702 m (5' 7\")   Wt 71.5 kg (157 lb 9.6 oz)   LMP 05/02/2023 (Exact Date)   SpO2 98%   BMI 24.68 kg/m²    Body mass index is 24.68 kg/m².    Gen: Alert and " oriented, No apparent distress.  HEENT: Head atraumatic, normocephalic. Pupils equal and round. No visible facial rash or swelling.   Neck: Neck is supple. Right anterior LN and submental LN palpated. Round and freely moveable.   Lungs: Normal effort, CTA bilaterally, no wheezes, rhonchi, or rales  CV: Regular rate and rhythm. No murmurs, rubs, or gallops.  ABD: +BS. Non-tender, non-distended. No rebound, rigidity, or guarding.  Ext: No clubbing, cyanosis, edema.    Assessment & Plan:     27 y.o. female with the following -     1. Pituitary abnormality (HCC)  Pituitary flattening on MRI, has appt with endocrine. Complete 8 am fasting lab testing prior to appt. Several chronic symptoms listed below. Follow up in 2 weeks to review results.   - CORTISOL; Future  - ACTH; Future  - TSH; Future  - FREE THYROXINE; Future  - TRIIDOTHYRONINE; Future  - PROLACTIN; Future  - FSH/LH; Future  - ESTRADIOL; Future    2. Persistent headaches    3. Facial swelling  - SEROTONIN; Future    4. Facial rash  - SEROTONIN; Future    5. Chronic night sweats  - SEROTONIN; Future  - LDH; Future  - CBC WITH DIFFERENTIAL; Future    6. Flushing  - SAMSON REFLEXIVE PROFILE; Future  - SEROTONIN; Future  - Quantiferon Gold Plus TB (4 tube); Future    7. Irregular menses  - TSH; Future  - FREE THYROXINE; Future  - TRIIDOTHYRONINE; Future  - PROLACTIN; Future  - FSH/LH; Future  - ESTRADIOL; Future  - Referral to Gynecology  - PROGESTERONE; Future    8. Chronic fatigue  - VITAMIN D,25 HYDROXY (DEFICIENCY); Future  - VITAMIN B12; Future  - SAMSON REFLEXIVE PROFILE; Future    9. Lymphadenopathy  Recheck in 2 weeks at office appt. If persists, then U/S imaging.     10. Encounter for vitamin deficiency screening  - VITAMIN D,25 HYDROXY (DEFICIENCY); Future  - VITAMIN B12; Future    Return in about 2 weeks (around 5/18/2023) for Follow-up labs/tests.    My total time spent caring for the patient on the day of the encounter was 37 minutes.   This does not include  time spent on separately billable procedures/tests.      Estefany Ramírez PA-C ([place])  Physician Assistant Certified  [facility]    Please note that this dictation was created using voice recognition software. I have made every reasonable attempt to correct obvious errors, but I expect that there are errors of grammar and possibly content that I did not discover before finalizing the note.

## 2023-05-04 NOTE — PATIENT INSTRUCTIONS
It was a pleasure meeting with you today at [facility]!    Your medical history/records and medications were reviewed today.     UPDATE on MyChart Results: If you have blood work, and/or imaging studies, or any other test or procedure completed, you will have access to results as soon as they become available in MyChart. Recently, these results will be available for review at the same time that your provider is able to see results!    This will likely mean you will see a result before your provider has had a chance to review and discuss with you.  Some results or care notes may be hard to understand and may be serious in nature.    We look at every result and your provider will contact you to explain what they mean and discuss appropriate next steps. Please allow for at least 72 business hours for chart and result review.     We prefer that you wait for your care team to contact you with your results.  Often, your provider will discuss your results with you at your next appointment. We look forward to continuing to partner with you in your care.    Please review my practice information below:    If you have any prescription refill requests, please send them via Tabber or discuss with your provider at the start of your office visits. Please allow 3-5 business days for lab and testing review and you will be contacted via Tabber with those results, or if advised to make a follow up appointment regarding those results, then please do so.     Once resulted, your lab/test/imaging results will show up automatically in your MyChart. Please wait for my interpretation and recommendations prior to viewing your results to avoid any unnecessary confusion or misinterpretation. I will address all of the lab values that I interpret as abnormal and message you accordingly on your MyChart. I will always send you a message about your results even if they are normal. If you do not hear back from me within 5-7 business  days after completing your tests, then please send me a message on [facility] so I can obtain your results (especially if you went to an outside lab or imaging center - [facility], [facility], etc).     If you have any additional questions or concerns beyond my interpretation of your results, please make an appointment with me to discuss in further detail.    Please only use the [employer] messaging system for questions regarding your most recent appointment or if advised to use otherwise (glucose or blood pressure reporting).     If you have any new problems or concerns, you must make an appointment to discuss. This includes any referral requests, lab requests (unless advised to notify me for pre-appt labs), medication side effects, or request for medication adjustments.     Please arrive 15 minutes prior to your appointment time to complete your check-in and intake with the medical assistant.      Thank you,    Estefany Ramírez PA-C ([place])  Physician Assistant Certified  [facility]    -----------------------------------------------------------------    Attn: Patients of [facility]:    In an effort to continue to provide excellent and efficient care to our patients, it is vital that we continue to use our resources appropriately. With that, this is a reminder that [employer] is used for prescription refill requests, test results, virtual visits, and chart review only.     Any new questions, concerns/conditions, lab/imaging requests, medication adjustments, new prescriptions, or referral requests do require an appointment (virtually or in person), unless discussed otherwise at your most recent appointment.     Thank you for your understanding,    [facility]

## 2023-05-04 NOTE — ASSESSMENT & PLAN NOTE
"Pt with a history of non-specific symptoms over the past several years including frequent headaches, facial swelling/rash/flushing, night sweats, hypotension, fatigue, episodic N/V, irregular menses, dizziness. She had an MRI brain ordered showing \"pituitary flattening\", otherwise no acute process. Saw an ophthalmologist and ruled out opthalm relation. Has an appt with endocrinology next week. No pituitary labs ordered.   "

## 2023-05-05 ENCOUNTER — HOSPITAL ENCOUNTER (OUTPATIENT)
Dept: LAB | Facility: MEDICAL CENTER | Age: 28
End: 2023-05-05
Attending: PHYSICIAN ASSISTANT
Payer: COMMERCIAL

## 2023-05-05 DIAGNOSIS — R23.2 FLUSHING: ICD-10-CM

## 2023-05-05 DIAGNOSIS — R21 FACIAL RASH: ICD-10-CM

## 2023-05-05 DIAGNOSIS — R22.0 FACIAL SWELLING: ICD-10-CM

## 2023-05-05 DIAGNOSIS — E23.7 PITUITARY ABNORMALITY (HCC): ICD-10-CM

## 2023-05-05 DIAGNOSIS — N92.6 IRREGULAR MENSES: ICD-10-CM

## 2023-05-05 DIAGNOSIS — R61 CHRONIC NIGHT SWEATS: ICD-10-CM

## 2023-05-05 DIAGNOSIS — Z13.21 ENCOUNTER FOR VITAMIN DEFICIENCY SCREENING: ICD-10-CM

## 2023-05-05 DIAGNOSIS — R53.82 CHRONIC FATIGUE: ICD-10-CM

## 2023-05-05 LAB
25(OH)D3 SERPL-MCNC: 38 NG/ML (ref 30–100)
BASOPHILS # BLD AUTO: 0.6 % (ref 0–1.8)
BASOPHILS # BLD: 0.03 K/UL (ref 0–0.12)
CORTIS SERPL-MCNC: 20.8 UG/DL (ref 0–23)
EOSINOPHIL # BLD AUTO: 0.06 K/UL (ref 0–0.51)
EOSINOPHIL NFR BLD: 1.1 % (ref 0–6.9)
ERYTHROCYTE [DISTWIDTH] IN BLOOD BY AUTOMATED COUNT: 40.9 FL (ref 35.9–50)
ESTRADIOL SERPL-MCNC: 16.9 PG/ML
FSH SERPL-ACNC: 7.5 MIU/ML
HCT VFR BLD AUTO: 47.9 % (ref 37–47)
HGB BLD-MCNC: 15.3 G/DL (ref 12–16)
IMM GRANULOCYTES # BLD AUTO: 0.02 K/UL (ref 0–0.11)
IMM GRANULOCYTES NFR BLD AUTO: 0.4 % (ref 0–0.9)
LDH SERPL L TO P-CCNC: 179 U/L (ref 107–266)
LH SERPL-ACNC: 13.1 IU/L
LYMPHOCYTES # BLD AUTO: 2.32 K/UL (ref 1–4.8)
LYMPHOCYTES NFR BLD: 43.4 % (ref 22–41)
MCH RBC QN AUTO: 28.5 PG (ref 27–33)
MCHC RBC AUTO-ENTMCNC: 31.9 G/DL (ref 33.6–35)
MCV RBC AUTO: 89.4 FL (ref 81.4–97.8)
MONOCYTES # BLD AUTO: 0.35 K/UL (ref 0–0.85)
MONOCYTES NFR BLD AUTO: 6.5 % (ref 0–13.4)
NEUTROPHILS # BLD AUTO: 2.57 K/UL (ref 2–7.15)
NEUTROPHILS NFR BLD: 48 % (ref 44–72)
NRBC # BLD AUTO: 0 K/UL
NRBC BLD-RTO: 0 /100 WBC
PLATELET # BLD AUTO: 246 K/UL (ref 164–446)
PMV BLD AUTO: 12.8 FL (ref 9–12.9)
PROGEST SERPL-MCNC: 0.62 NG/ML
PROLACTIN SERPL-MCNC: 13.6 NG/ML (ref 2.8–26)
RBC # BLD AUTO: 5.36 M/UL (ref 4.2–5.4)
T3 SERPL-MCNC: 100 NG/DL (ref 60–181)
T4 FREE SERPL-MCNC: 1.16 NG/DL (ref 0.93–1.7)
TSH SERPL DL<=0.005 MIU/L-ACNC: 1.52 UIU/ML (ref 0.38–5.33)
VIT B12 SERPL-MCNC: 662 PG/ML (ref 211–911)
WBC # BLD AUTO: 5.4 K/UL (ref 4.8–10.8)

## 2023-05-05 PROCEDURE — 82607 VITAMIN B-12: CPT

## 2023-05-05 PROCEDURE — 84443 ASSAY THYROID STIM HORMONE: CPT

## 2023-05-05 PROCEDURE — 83615 LACTATE (LD) (LDH) ENZYME: CPT

## 2023-05-05 PROCEDURE — 36415 COLL VENOUS BLD VENIPUNCTURE: CPT

## 2023-05-05 PROCEDURE — 82024 ASSAY OF ACTH: CPT

## 2023-05-05 PROCEDURE — 83001 ASSAY OF GONADOTROPIN (FSH): CPT

## 2023-05-05 PROCEDURE — 82306 VITAMIN D 25 HYDROXY: CPT

## 2023-05-05 PROCEDURE — 86038 ANTINUCLEAR ANTIBODIES: CPT

## 2023-05-05 PROCEDURE — 86480 TB TEST CELL IMMUN MEASURE: CPT

## 2023-05-05 PROCEDURE — 84480 ASSAY TRIIODOTHYRONINE (T3): CPT

## 2023-05-05 PROCEDURE — 85025 COMPLETE CBC W/AUTO DIFF WBC: CPT

## 2023-05-05 PROCEDURE — 84439 ASSAY OF FREE THYROXINE: CPT

## 2023-05-05 PROCEDURE — 83002 ASSAY OF GONADOTROPIN (LH): CPT

## 2023-05-05 PROCEDURE — 82533 TOTAL CORTISOL: CPT

## 2023-05-05 PROCEDURE — 84144 ASSAY OF PROGESTERONE: CPT

## 2023-05-05 PROCEDURE — 82670 ASSAY OF TOTAL ESTRADIOL: CPT

## 2023-05-05 PROCEDURE — 84260 ASSAY OF SEROTONIN: CPT

## 2023-05-05 PROCEDURE — 84146 ASSAY OF PROLACTIN: CPT

## 2023-05-06 LAB — ACTH PLAS-MCNC: 14.1 PG/ML (ref 7.2–63.3)

## 2023-05-07 LAB — NUCLEAR IGG SER QL IA: NORMAL

## 2023-05-08 LAB
GAMMA INTERFERON BACKGROUND BLD IA-ACNC: 0.12 IU/ML
M TB IFN-G BLD-IMP: NEGATIVE
M TB IFN-G CD4+ BCKGRND COR BLD-ACNC: 0.09 IU/ML
MITOGEN IGNF BCKGRD COR BLD-ACNC: >10 IU/ML
QFT TB2 - NIL TBQ2: 0.04 IU/ML
SEROTONIN SER-MCNC: 163 NG/ML (ref 50–220)

## 2023-05-09 ENCOUNTER — OFFICE VISIT (OUTPATIENT)
Dept: ENDOCRINOLOGY | Facility: MEDICAL CENTER | Age: 28
End: 2023-05-09
Payer: COMMERCIAL

## 2023-05-09 VITALS
SYSTOLIC BLOOD PRESSURE: 112 MMHG | DIASTOLIC BLOOD PRESSURE: 80 MMHG | HEIGHT: 67 IN | BODY MASS INDEX: 24.8 KG/M2 | WEIGHT: 158 LBS | HEART RATE: 118 BPM | OXYGEN SATURATION: 100 %

## 2023-05-09 DIAGNOSIS — E23.7 PITUITARY ABNORMALITY (HCC): ICD-10-CM

## 2023-05-09 DIAGNOSIS — R23.2 FACIAL FLUSHING: ICD-10-CM

## 2023-05-09 PROCEDURE — 99214 OFFICE O/P EST MOD 30 MIN: CPT

## 2023-05-09 ASSESSMENT — FIBROSIS 4 INDEX: FIB4 SCORE: 0.49

## 2023-05-09 NOTE — PROGRESS NOTES
Chief Complaint: Consult requested by Estefany Ramírez P.A.-C. for evaluation of Pituitary abnormality    Subjective:      Michel Angelo is a 27 y.o. female here for initial evaluation of pituitary abnormality.  She was first diagnosed with a pituitary abnormality in 2023.  Saw an ophthalmologist and ruled out opthalm relation. Patients states she family history of cancer.     Presenting symptoms which led to the investigation of the pituitary included  a history of non-specific symptoms over the past several years including frequent headaches, facial swelling/rash/flushing-last about 10-15 mins then subsides, night sweats,hypotension, fatigue, episodic N/V, irregular menses, dizziness.    Home pretty much  Initial MRI of the pituitary on April 2023 revealed Mild flattening of the pituitary gland is noted, stable from the prior study.The Sella is within normal limits. MRI within normal limits.   Prior pituitary surgery: No.    Prior pituitary radiation therapy: No.    She reports headache on right side behind the eye lasting all day if no medication , reports at night is poor vision difficulties, reports periodically frequent urination, denies increased thirst, denies galactorrhea.   Latest Reference Range & Units 05/05/23 07:10   Cortisol 0.0 - 23.0 ug/dL 20.8      Latest Reference Range & Units 05/05/23 07:09   Serotonin, Serum 50 - 220 ng/mL 163      Latest Reference Range & Units 05/05/23 07:10   TSH 0.380 - 5.330 uIU/mL 1.520   Free T-4 0.93 - 1.70 ng/dL 1.16   T3 60.0 - 181.0 ng/dL 100.0      Latest Reference Range & Units 05/05/23 07:09   Acth 7.2 - 63.3 pg/mL 14.1      Latest Reference Range & Units 05/05/23 07:10   Estradiol-E2 pg/mL 16.9   Follicle Stimulating Hormone mIU/mL 7.5   Luteinizing Hormone IU/L 13.1   Progesterone ng/mL 0.62   Prolactin 2.80 - 26.00 ng/mL 13.60     Patient's medications, allergies, and social histories were reviewed and updated as appropriate.    ROS:     CONS:     No  fever, no chills, no weight loss, no fatigue   EYES:      No diplopia, no blurry vision, no redness of eyes, no swelling of eyelids   ENT:    No hearing loss, No ear pain, No sore throat, no dysphagia, no neck swelling   CV:     No chest pain, no palpitations, no claudication, no orthopnea, no PND   PULM:    No SOB, no cough, no hemoptysis, no wheezing    GI:   No nausea, no vomiting, no diarrhea, no constipation, no bloody stools   :  Passing urine well, no dysuria, no hematuria   ENDO:   No polyuria, no polydipsia, no heat intolerance, no cold intolerance   NEURO: No headaches, no dizziness, no convulsions, no tremors   MUSC:  No joint swellings, no arthralgias, no myalgias, no weakness   SKIN:   No rash, no ulcers, no dry skin   PSYCH:   No depression, no anxiety, no difficulty sleeping       Past Medical History:  Patient Active Problem List    Diagnosis Date Noted    Pituitary abnormality (HCC) 05/04/2023    Facial swelling 05/04/2023    Chronic night sweats 05/04/2023    Irregular menses 05/04/2023    Persistent headaches 03/21/2023    Breast complaint 12/22/2022    Hemorrhoids 08/19/2022    Chest pain 05/25/2022    Anxiety 05/25/2022    Vomiting without nausea 03/15/2022    Chronic hypotension 02/21/2020    Family history of hypercholesterolemia 03/30/2017    Family history of diabetes mellitus (DM) 03/30/2017    Family history of carcinoid tumor 04/08/2016       Past Surgical History:  Past Surgical History:   Procedure Laterality Date    KNEE ARTHROSCOPY Right 8/22/2019    Procedure: ARTHROSCOPY, KNEE;  Surgeon: Vinay Moore M.D.;  Location: [facility];  Service: Orthopedics    MENISCECTOMY, KNEE, MEDIAL Right 8/22/2019    Procedure: MENISCECTOMY, KNEE, MEDIAL - PARTIAL VERSUS;  Surgeon: Vinay Moore M.D.;  Location: [facility];  Service: Orthopedics    MENISCAL REPAIR Right 8/22/2019    Procedure: REPAIR, MENISCUS, KNEE - PROCEED AS INDICATED;  Surgeon:  Vinay Moore M.D.;  Location: SURGERY [facility];  Service: Orthopedics    RHINOPLASTY  03/2019    KNEE ARTHROSCOPY Left 05/2018        Allergies:  Compazine [prochlorperazine], Gluten meal, Iodine contrast [diagnostic x-ray materials], Gluten meal, and Iodine     Current Medications:    Current Outpatient Medications:     ibuprofen (MOTRIN) 800 MG Tab, Take 1 Tablet by mouth every 8 hours as needed for Moderate Pain or Headache., Disp: 30 Tablet, Rfl: 1    Social History:  Social History     Socioeconomic History    Marital status: Single     Spouse name: Not on file    Number of children: Not on file    Years of education: Not on file    Highest education level: Not on file   Occupational History    Occupation: [employer]- 10th grade     Employer: [employer]   Tobacco Use    Smoking status: Never    Smokeless tobacco: Never   Vaping Use    Vaping Use: Never used   Substance and Sexual Activity    Alcohol use: Yes     Comment: occasional    Drug use: Never    Sexual activity: Yes     Partners: Female   Other Topics Concern    Not on file   Social History Narrative    Born in [place]    Raised in [place]    Has a female partner     Social Determinants of Health     Financial Resource Strain: Not on file   Food Insecurity: Not on file   Transportation Needs: Not on file   Physical Activity: Not on file   Stress: Not on file   Social Connections: Not on file   Intimate Partner Violence: Not on file   Housing Stability: Not on file        Family History:   Family History   Problem Relation Age of Onset    Hyperlipidemia Mother     Hyperlipidemia Father     Cancer Brother 14        adenocarcinoma- lung, carcinoid- appendix    Diabetes Maternal Grandmother     Breast Cancer Maternal Grandmother         postmenopausal    Genitourinary () Problems Other         cousin         PHYSICAL EXAM:   Vital signs: /80 (BP Location: Right arm, Patient Position: Sitting, BP Cuff Size: Adult)   Pulse (!) 118   Ht  "1.702 m (5' 7\")   Wt 71.7 kg (158 lb)   LMP 05/02/2023 (Exact Date)   SpO2 100%   BMI 24.75 kg/m²   GENERAL: Well-developed, well-nourished  in no apparent distress.   EYE: No ocular and eyelid asymmetry, Anicteric sclerae,  PERRL, No exophthalmos or lidlag  HENT: Hearing grossly intact, Normocephalic, atraumatic. Pink, moist mucous membranes, No exudate  NECK: Supple. Trachea midline.   CARDIOVASCULAR: Regular rate and rhythm. No murmurs, rubs, or gallops.   LUNGS: Clear to auscultation bilaterally   ABDOMEN: Soft, nontender with positive bowel sounds.   EXTREMITIES: No clubbing, cyanosis, or edema.   NEUROLOGICAL: Cranial nerves II-XII are grossly intact   Symmetric reflexes at the patella no proximal muscle weakness, No visible tremor with both outstretched hands  LYMPH: No cervical, supraclavicular,  adenopathy palpated.   SKIN: No rashes, lesions. Turgor is normal.    Labs:  Lab Results   Component Value Date/Time    WBC 5.4 05/05/2023 07:10 AM    RBC 5.36 05/05/2023 07:10 AM    HEMOGLOBIN 15.3 05/05/2023 07:10 AM    MCV 89.4 05/05/2023 07:10 AM    MCH 28.5 05/05/2023 07:10 AM    MCHC 31.9 (L) 05/05/2023 07:10 AM    RDW 40.9 05/05/2023 07:10 AM    MPV 12.8 05/05/2023 07:10 AM       Lab Results   Component Value Date/Time    SODIUM 138 01/27/2023 04:36 PM    POTASSIUM 3.8 01/27/2023 04:36 PM    CHLORIDE 103 01/27/2023 04:36 PM    CO2 25 01/27/2023 04:36 PM    ANION 10.0 01/27/2023 04:36 PM    GLUCOSE 80 01/27/2023 04:36 PM    BUN 13 01/27/2023 04:36 PM    CREATININE 0.84 01/27/2023 04:36 PM    CALCIUM 9.5 01/27/2023 04:36 PM    ASTSGOT 22 01/27/2023 04:36 PM    ALTSGPT 21 01/27/2023 04:36 PM    TBILIRUBIN 0.2 01/27/2023 04:36 PM    ALBUMIN 4.7 01/27/2023 04:36 PM    TOTPROTEIN 7.6 01/27/2023 04:36 PM    GLOBULIN 2.9 01/27/2023 04:36 PM    AGRATIO 1.6 01/27/2023 04:36 PM       Lab Results   Component Value Date/Time    TSHULTRASEN 1.520 05/05/2023 0710     No results found for: LCIR  No results found for: " FREET3  No results found for: THYSTIMIG        Imaging:      ASSESSMENT/PLAN:   1. Facial flushing  Unstable  Discussed possible causes of facial flushing, sweating, hypotension including pheochromocytoma.  I will get metanephrine and urine catecholamines to rule out pheochromocytoma  - METANEPHRINE FRA QN 24 HR; Future  - URINE CATECHOLAMINES FRACTIONATED; Future    2. Pituitary abnormality (HCC)  Stable  Discussed the MRI of brain with patient.  Results showed mild flattening of pituitary gland stable from the prior study.The Sella is within normal limits. MRI within normal limits.  The mild flattening of pituitary gland may possibility the distant anatomical structure of the gland it does not correlate with empty sella syndrome due to the hormone levels being within normal limits.  - ACTH; Future  - Comp Metabolic Panel; Future  - CBC WITH DIFFERENTIAL; Future  - CORTISOL - AM  - ESTRADIOL; Future  - FREE THYROXINE; Future  - TSH; Future  - LUTEINIZING HORMONE SERUM; Future  - FSH; Future  - PROLACTIN; Future  - TESTOSTERONE, FREE, FEMALES/CHILDREN; Future    Return in about 6 weeks (around 6/20/2023).    Thank you kindly for allowing me to participate in the endocrine care plan for this patient.    Semaj Regan, APRN   05/09/23    CC:   Estefany Ramírez P.A.-C.

## 2023-05-23 ENCOUNTER — HOSPITAL ENCOUNTER (OUTPATIENT)
Facility: MEDICAL CENTER | Age: 28
End: 2023-05-23
Attending: PHYSICIAN ASSISTANT
Payer: COMMERCIAL

## 2023-05-23 ENCOUNTER — OFFICE VISIT (OUTPATIENT)
Dept: MEDICAL GROUP | Facility: IMAGING CENTER | Age: 28
End: 2023-05-23
Payer: COMMERCIAL

## 2023-05-23 VITALS
SYSTOLIC BLOOD PRESSURE: 100 MMHG | HEIGHT: 67 IN | OXYGEN SATURATION: 98 % | RESPIRATION RATE: 15 BRPM | BODY MASS INDEX: 24.71 KG/M2 | HEART RATE: 86 BPM | DIASTOLIC BLOOD PRESSURE: 64 MMHG | TEMPERATURE: 98 F | WEIGHT: 157.4 LBS

## 2023-05-23 DIAGNOSIS — F41.9 ANXIETY: ICD-10-CM

## 2023-05-23 DIAGNOSIS — R35.0 URINARY FREQUENCY: ICD-10-CM

## 2023-05-23 DIAGNOSIS — R41.89 BRAIN FOG: ICD-10-CM

## 2023-05-23 DIAGNOSIS — R39.15 URINARY URGENCY: ICD-10-CM

## 2023-05-23 LAB
APPEARANCE UR: CLEAR
BILIRUB UR STRIP-MCNC: NEGATIVE MG/DL
COLOR UR AUTO: YELLOW
GLUCOSE UR STRIP.AUTO-MCNC: NEGATIVE MG/DL
KETONES UR STRIP.AUTO-MCNC: NEGATIVE MG/DL
LEUKOCYTE ESTERASE UR QL STRIP.AUTO: NEGATIVE
NITRITE UR QL STRIP.AUTO: NEGATIVE
PH UR STRIP.AUTO: 6.5 [PH] (ref 5–8)
PROT UR QL STRIP: NEGATIVE MG/DL
RBC UR QL AUTO: NEGATIVE
SP GR UR STRIP.AUTO: 1.01
UROBILINOGEN UR STRIP-MCNC: 0.2 MG/DL

## 2023-05-23 PROCEDURE — 99214 OFFICE O/P EST MOD 30 MIN: CPT | Performed by: PHYSICIAN ASSISTANT

## 2023-05-23 PROCEDURE — 1126F AMNT PAIN NOTED NONE PRSNT: CPT | Performed by: PHYSICIAN ASSISTANT

## 2023-05-23 PROCEDURE — 81002 URINALYSIS NONAUTO W/O SCOPE: CPT | Performed by: PHYSICIAN ASSISTANT

## 2023-05-23 PROCEDURE — 87086 URINE CULTURE/COLONY COUNT: CPT

## 2023-05-23 PROCEDURE — 3074F SYST BP LT 130 MM HG: CPT | Performed by: PHYSICIAN ASSISTANT

## 2023-05-23 PROCEDURE — 3078F DIAST BP <80 MM HG: CPT | Performed by: PHYSICIAN ASSISTANT

## 2023-05-23 RX ORDER — HYDROXYZINE HYDROCHLORIDE 25 MG/1
25 TABLET, FILM COATED ORAL 3 TIMES DAILY PRN
Qty: 30 TABLET | Refills: 0 | Status: SHIPPED | OUTPATIENT
Start: 2023-05-23 | End: 2023-09-29

## 2023-05-23 ASSESSMENT — PAIN SCALES - GENERAL: PAINLEVEL: NO PAIN

## 2023-05-23 ASSESSMENT — FIBROSIS 4 INDEX: FIB4 SCORE: 0.49

## 2023-05-23 NOTE — PATIENT INSTRUCTIONS
It was a pleasure meeting with you today at [facility]!    Your medical history/records and medications were reviewed today.     UPDATE on MyChart Results: If you have blood work, and/or imaging studies, or any other test or procedure completed, you will have access to results as soon as they become available in MyChart. Recently, these results will be available for review at the same time that your provider is able to see results!    This will likely mean you will see a result before your provider has had a chance to review and discuss with you.  Some results or care notes may be hard to understand and may be serious in nature.    We look at every result and your provider will contact you to explain what they mean and discuss appropriate next steps. Please allow for at least 72 business hours for chart and result review.     We prefer that you wait for your care team to contact you with your results.  Often, your provider will discuss your results with you at your next appointment. We look forward to continuing to partner with you in your care.    Please review my practice information below:    If you have any prescription refill requests, please send them via [facility] or discuss with your provider at the start of your office visits. Please allow 3-5 business days for lab and testing review and you will be contacted via [facility] with those results, or if advised to make a follow up appointment regarding those results, then please do so.     Once resulted, your lab/test/imaging results will show up automatically in your MyChart. Please wait for my interpretation and recommendations prior to viewing your results to avoid any unnecessary confusion or misinterpretation. I will address all of the lab values that I interpret as abnormal and message you accordingly on your MyChart. I will always send you a message about your results even if they are normal. If you do not hear back from me within 5-7 business  days after completing your tests, then please send me a message on [facility] so I can obtain your results (especially if you went to an outside lab or imaging center - [facility], [facility], etc).     If you have any additional questions or concerns beyond my interpretation of your results, please make an appointment with me to discuss in further detail.    Please only use the [employer] messaging system for questions regarding your most recent appointment or if advised to use otherwise (glucose or blood pressure reporting).     If you have any new problems or concerns, you must make an appointment to discuss. This includes any referral requests, lab requests (unless advised to notify me for pre-appt labs), medication side effects, or request for medication adjustments.     Please arrive 15 minutes prior to your appointment time to complete your check-in and intake with the medical assistant.      Thank you,    Estefany Ramírez PA-C ([place])  Physician Assistant Certified  [facility]    -----------------------------------------------------------------    Attn: Patients of [facility]:    In an effort to continue to provide excellent and efficient care to our patients, it is vital that we continue to use our resources appropriately. With that, this is a reminder that [facility] is used for prescription refill requests, test results, virtual visits, and chart review only.     Any new questions, concerns/conditions, lab/imaging requests, medication adjustments, new prescriptions, or referral requests do require an appointment (virtually or in person), unless discussed otherwise at your most recent appointment.     Thank you for your understanding,    [facility]

## 2023-05-23 NOTE — ASSESSMENT & PLAN NOTE
Patient states that over the past week she has had urinary urgency and frequency.  No burning with urination or discharge.

## 2023-05-23 NOTE — PROGRESS NOTES
Subjective:     CC:   Chief Complaint   Patient presents with    Lab Results    UTI     Pt states she has urgency to go constantly started this last week.        HPI:   Michel presents today to discuss:    Anxiety  Patient admits to chronic intermittent episodes of anxiety.  Does not have symptoms daily.  States that her health, as well as her family's health, does trigger a lot of her anxiety feelings.  Sometimes has brain fog.  No history of anxiety medication.  Has met with a therapist in the past.    Urinary frequency  Patient states that over the past week she has had urinary urgency and frequency.  No burning with urination or discharge.      Past Medical History:   Diagnosis Date    Chronic constipation 4/8/2016    Dysmenorrhea 4/8/2016    Hypotension 2/21/2020    -I suspect secondary to hypovolemia -We will run some serum studies -I have recommended that she increase from 2 of the 40 ounce flask's to 3 or 4 especially given that she works out for 1 hour a day 7 days a week -I also recommend considering electrolyte tabs either daily or every other day pending how she feels -I do not suspect inner ear issues from her recent viral illness -rto if symptoms do     NEGATIVE HISTORY OF     Neuritis- right scalp cutaneous x 5 days 9/22/2016     Family History   Problem Relation Age of Onset    Hyperlipidemia Mother     Hyperlipidemia Father     Cancer Brother 14        adenocarcinoma- lung, carcinoid- appendix    Diabetes Maternal Grandmother     Breast Cancer Maternal Grandmother         postmenopausal    Genitourinary () Problems Other         cousin     Past Surgical History:   Procedure Laterality Date    KNEE ARTHROSCOPY Right 8/22/2019    Procedure: ARTHROSCOPY, KNEE;  Surgeon: Vinay Moore M.D.;  Location: SURGERY [facility];  Service: Orthopedics    MENISCECTOMY, KNEE, MEDIAL Right 8/22/2019    Procedure: MENISCECTOMY, KNEE, MEDIAL - PARTIAL VERSUS;  Surgeon: Vinay Moore M.D.;   "Location: SURGERY [facility];  Service: Orthopedics    MENISCAL REPAIR Right 8/22/2019    Procedure: REPAIR, MENISCUS, KNEE - PROCEED AS INDICATED;  Surgeon: Vinay Moore M.D.;  Location: SURGERY [facility];  Service: Orthopedics    RHINOPLASTY  03/2019    KNEE ARTHROSCOPY Left 05/2018     Social History     Tobacco Use    Smoking status: Never    Smokeless tobacco: Never   Vaping Use    Vaping Use: Never used   Substance Use Topics    Alcohol use: Yes     Comment: occasional    Drug use: Never     Social History     Social History Narrative    Born in [place]    Raised in [place]    Has a female partner     Current Outpatient Medications Ordered in Epic   Medication Sig Dispense Refill    hydrOXYzine HCl (ATARAX) 25 MG Tab Take 1 Tablet by mouth 3 times a day as needed for Anxiety. 30 Tablet 0    ibuprofen (MOTRIN) 800 MG Tab Take 1 Tablet by mouth every 8 hours as needed for Moderate Pain or Headache. 30 Tablet 1     No current Epic-ordered facility-administered medications on file.     Compazine [prochlorperazine], Gluten meal, Iodine contrast [diagnostic x-ray materials], Gluten meal, and Iodine    PMH/PSH/FH/Social history reviewed.  Vaccinations discussed.  Previous records and labs reviewed. Discussed age appropriate anticipatory guidance.    ROS: see hpi  Gen: no fevers/chills  Pulm: no sob, no cough  CV: no chest pain, no palpitations, no edema  GI: no nausea/vomiting, no diarrhea  Skin: no rash    Objective:   Exam:  /64 (BP Location: Left arm, Patient Position: Sitting, BP Cuff Size: Adult)   Pulse 86   Temp 36.7 °C (98 °F) (Temporal)   Resp 15   Ht 1.702 m (5' 7\")   Wt 71.4 kg (157 lb 6.4 oz)   LMP 05/02/2023 (Exact Date)   SpO2 98%   BMI 24.65 kg/m²    Body mass index is 24.65 kg/m².    Gen: Alert and oriented, No apparent distress.  HEENT: Head atraumatic, normocephalic. Pupils equal and round.  Lungs: Normal effort, CTA bilaterally, no wheezes, rhonchi, or " rales  CV: Regular rate and rhythm. No murmurs, rubs, or gallops.  Ext: No clubbing, cyanosis, edema.    Assessment & Plan:     27 y.o. female with the following -     1. Urinary frequency  - OSMOLALITY SERUM; Future  - Basic Metabolic Panel; Future  - OSMOLALITY URINE; Future  - URINE SODIUM RANDOM; Future    2. Urinary urgency  UA negative, send culture and check testing below.  - POCT Urinalysis  - URINE CULTURE(NEW); Future  - OSMOLALITY SERUM; Future  - Basic Metabolic Panel; Future  - OSMOLALITY URINE; Future  - URINE SODIUM RANDOM; Future    3. Brain fog  - EBV ACUTE INFECTION AB PANEL; Future    4. Anxiety  Chronic, uncontrolled.  Patient opted for as needed anxiety medication, if taking daily, then will consider bupropion versus buspirone.  - hydrOXYzine HCl (ATARAX) 25 MG Tab; Take 1 Tablet by mouth 3 times a day as needed for Anxiety.  Dispense: 30 Tablet; Refill: 0  -Side effects include possible drowsines and dry mouth.    Return for Will notify patient to follow-up pending tests.    Estefany Ramírez PA-C ([place])  Physician Assistant Certified  [facility]    Please note that this dictation was created using voice recognition software. I have made every reasonable attempt to correct obvious errors, but I expect that there are errors of grammar and possibly content that I did not discover before finalizing the note.

## 2023-05-23 NOTE — ASSESSMENT & PLAN NOTE
Patient admits to chronic intermittent episodes of anxiety.  Does not have symptoms daily.  States that her health, as well as her family's health, does trigger a lot of her anxiety feelings.  Sometimes has brain fog.  No history of anxiety medication.  Has met with a therapist in the past.

## 2023-05-26 LAB
BACTERIA UR CULT: NORMAL
SIGNIFICANT IND 70042: NORMAL
SITE SITE: NORMAL
SOURCE SOURCE: NORMAL

## 2023-06-28 ENCOUNTER — TELEPHONE (OUTPATIENT)
Dept: ENDOCRINOLOGY | Facility: MEDICAL CENTER | Age: 28
End: 2023-06-28
Payer: COMMERCIAL

## 2023-08-10 ENCOUNTER — APPOINTMENT (OUTPATIENT)
Dept: RADIOLOGY | Facility: MEDICAL CENTER | Age: 28
End: 2023-08-10
Attending: EMERGENCY MEDICINE
Payer: COMMERCIAL

## 2023-08-10 ENCOUNTER — HOSPITAL ENCOUNTER (EMERGENCY)
Facility: MEDICAL CENTER | Age: 28
End: 2023-08-10
Attending: EMERGENCY MEDICINE
Payer: COMMERCIAL

## 2023-08-10 VITALS
TEMPERATURE: 98.7 F | HEIGHT: 67 IN | OXYGEN SATURATION: 94 % | RESPIRATION RATE: 14 BRPM | WEIGHT: 160 LBS | HEART RATE: 85 BPM | SYSTOLIC BLOOD PRESSURE: 133 MMHG | DIASTOLIC BLOOD PRESSURE: 62 MMHG | BODY MASS INDEX: 25.11 KG/M2

## 2023-08-10 DIAGNOSIS — R07.9 ACUTE CHEST PAIN: ICD-10-CM

## 2023-08-10 LAB
ALBUMIN SERPL BCP-MCNC: 4.8 G/DL (ref 3.2–4.9)
ALBUMIN/GLOB SERPL: 1.6 G/DL
ALP SERPL-CCNC: 52 U/L (ref 30–99)
ALT SERPL-CCNC: 18 U/L (ref 2–50)
ANION GAP SERPL CALC-SCNC: 13 MMOL/L (ref 7–16)
APTT PPP: 31 SEC (ref 24.7–36)
AST SERPL-CCNC: 21 U/L (ref 12–45)
BASOPHILS # BLD AUTO: 0.5 % (ref 0–1.8)
BASOPHILS # BLD: 0.04 K/UL (ref 0–0.12)
BILIRUB SERPL-MCNC: 0.4 MG/DL (ref 0.1–1.5)
BUN SERPL-MCNC: 12 MG/DL (ref 8–22)
CALCIUM ALBUM COR SERPL-MCNC: 9.4 MG/DL (ref 8.5–10.5)
CALCIUM SERPL-MCNC: 10 MG/DL (ref 8.5–10.5)
CHLORIDE SERPL-SCNC: 100 MMOL/L (ref 96–112)
CO2 SERPL-SCNC: 27 MMOL/L (ref 20–33)
CREAT SERPL-MCNC: 0.92 MG/DL (ref 0.5–1.4)
EKG IMPRESSION: NORMAL
EOSINOPHIL # BLD AUTO: 0.09 K/UL (ref 0–0.51)
EOSINOPHIL NFR BLD: 1.1 % (ref 0–6.9)
ERYTHROCYTE [DISTWIDTH] IN BLOOD BY AUTOMATED COUNT: 38.5 FL (ref 35.9–50)
GFR SERPLBLD CREATININE-BSD FMLA CKD-EPI: 88 ML/MIN/1.73 M 2
GLOBULIN SER CALC-MCNC: 3 G/DL (ref 1.9–3.5)
GLUCOSE SERPL-MCNC: 91 MG/DL (ref 65–99)
HCG SERPL QL: NEGATIVE
HCT VFR BLD AUTO: 43.7 % (ref 37–47)
HGB BLD-MCNC: 14.7 G/DL (ref 12–16)
IMM GRANULOCYTES # BLD AUTO: 0.05 K/UL (ref 0–0.11)
IMM GRANULOCYTES NFR BLD AUTO: 0.6 % (ref 0–0.9)
INR PPP: 0.96 (ref 0.87–1.13)
LYMPHOCYTES # BLD AUTO: 3.27 K/UL (ref 1–4.8)
LYMPHOCYTES NFR BLD: 40.6 % (ref 22–41)
MCH RBC QN AUTO: 29.2 PG (ref 27–33)
MCHC RBC AUTO-ENTMCNC: 33.6 G/DL (ref 32.2–35.5)
MCV RBC AUTO: 86.9 FL (ref 81.4–97.8)
MONOCYTES # BLD AUTO: 0.49 K/UL (ref 0–0.85)
MONOCYTES NFR BLD AUTO: 6.1 % (ref 0–13.4)
NEUTROPHILS # BLD AUTO: 4.11 K/UL (ref 1.82–7.42)
NEUTROPHILS NFR BLD: 51.1 % (ref 44–72)
NRBC # BLD AUTO: 0 K/UL
NRBC BLD-RTO: 0 /100 WBC (ref 0–0.2)
PLATELET # BLD AUTO: 308 K/UL (ref 164–446)
PMV BLD AUTO: 11.7 FL (ref 9–12.9)
POTASSIUM SERPL-SCNC: 3.9 MMOL/L (ref 3.6–5.5)
PROT SERPL-MCNC: 7.8 G/DL (ref 6–8.2)
PROTHROMBIN TIME: 12.7 SEC (ref 12–14.6)
RBC # BLD AUTO: 5.03 M/UL (ref 4.2–5.4)
SODIUM SERPL-SCNC: 140 MMOL/L (ref 135–145)
TROPONIN T SERPL-MCNC: <6 NG/L (ref 6–19)
WBC # BLD AUTO: 8.1 K/UL (ref 4.8–10.8)

## 2023-08-10 PROCEDURE — 94760 N-INVAS EAR/PLS OXIMETRY 1: CPT

## 2023-08-10 PROCEDURE — 93005 ELECTROCARDIOGRAM TRACING: CPT | Performed by: EMERGENCY MEDICINE

## 2023-08-10 PROCEDURE — 99285 EMERGENCY DEPT VISIT HI MDM: CPT

## 2023-08-10 PROCEDURE — 71275 CT ANGIOGRAPHY CHEST: CPT

## 2023-08-10 PROCEDURE — 93005 ELECTROCARDIOGRAM TRACING: CPT

## 2023-08-10 PROCEDURE — 85730 THROMBOPLASTIN TIME PARTIAL: CPT

## 2023-08-10 PROCEDURE — 85025 COMPLETE CBC W/AUTO DIFF WBC: CPT

## 2023-08-10 PROCEDURE — 36415 COLL VENOUS BLD VENIPUNCTURE: CPT

## 2023-08-10 PROCEDURE — 96375 TX/PRO/DX INJ NEW DRUG ADDON: CPT

## 2023-08-10 PROCEDURE — 84484 ASSAY OF TROPONIN QUANT: CPT

## 2023-08-10 PROCEDURE — 85610 PROTHROMBIN TIME: CPT

## 2023-08-10 PROCEDURE — 84703 CHORIONIC GONADOTROPIN ASSAY: CPT

## 2023-08-10 PROCEDURE — 80053 COMPREHEN METABOLIC PANEL: CPT

## 2023-08-10 PROCEDURE — 700111 HCHG RX REV CODE 636 W/ 250 OVERRIDE (IP): Performed by: EMERGENCY MEDICINE

## 2023-08-10 PROCEDURE — 96374 THER/PROPH/DIAG INJ IV PUSH: CPT

## 2023-08-10 PROCEDURE — 700117 HCHG RX CONTRAST REV CODE 255: Performed by: EMERGENCY MEDICINE

## 2023-08-10 RX ORDER — DIPHENHYDRAMINE HYDROCHLORIDE 50 MG/ML
50 INJECTION INTRAMUSCULAR; INTRAVENOUS ONCE
Status: COMPLETED | OUTPATIENT
Start: 2023-08-10 | End: 2023-08-10

## 2023-08-10 RX ORDER — METHYLPREDNISOLONE SODIUM SUCCINATE 125 MG/2ML
125 INJECTION, POWDER, LYOPHILIZED, FOR SOLUTION INTRAMUSCULAR; INTRAVENOUS ONCE
Status: COMPLETED | OUTPATIENT
Start: 2023-08-10 | End: 2023-08-10

## 2023-08-10 RX ORDER — LORAZEPAM 2 MG/ML
0.5 INJECTION INTRAMUSCULAR ONCE
Status: COMPLETED | OUTPATIENT
Start: 2023-08-10 | End: 2023-08-10

## 2023-08-10 RX ORDER — MORPHINE SULFATE 4 MG/ML
4 INJECTION INTRAVENOUS ONCE
Status: COMPLETED | OUTPATIENT
Start: 2023-08-10 | End: 2023-08-10

## 2023-08-10 RX ORDER — LORAZEPAM 1 MG/1
1 TABLET ORAL EVERY 6 HOURS PRN
Qty: 20 TABLET | Refills: 0 | Status: SHIPPED | OUTPATIENT
Start: 2023-08-10 | End: 2023-08-15

## 2023-08-10 RX ADMIN — LORAZEPAM 0.5 MG: 2 INJECTION INTRAMUSCULAR; INTRAVENOUS at 14:38

## 2023-08-10 RX ADMIN — METHYLPREDNISOLONE SODIUM SUCCINATE 125 MG: 125 INJECTION, POWDER, FOR SOLUTION INTRAMUSCULAR; INTRAVENOUS at 14:39

## 2023-08-10 RX ADMIN — MORPHINE SULFATE 4 MG: 4 INJECTION, SOLUTION INTRAMUSCULAR; INTRAVENOUS at 14:38

## 2023-08-10 RX ADMIN — IOHEXOL 100 ML: 350 INJECTION, SOLUTION INTRAVENOUS at 15:22

## 2023-08-10 RX ADMIN — DIPHENHYDRAMINE HYDROCHLORIDE 50 MG: 50 INJECTION, SOLUTION INTRAMUSCULAR; INTRAVENOUS at 14:38

## 2023-08-10 ASSESSMENT — FIBROSIS 4 INDEX: FIB4 SCORE: 0.49

## 2023-08-10 ASSESSMENT — LIFESTYLE VARIABLES: DO YOU DRINK ALCOHOL: NO

## 2023-08-10 NOTE — ED TRIAGE NOTES
".  Chief Complaint   Patient presents with    Chest Pain    Shortness of Breath       26 yo female wheeled to triage for above complaint. Recent mastectomy on Monday. Patient reports worsening chest pain, and shortness of breath. JOHN drains in place.     EKG completed.     Patient to Red 9      /81   Pulse 89   Temp 37.2 °C (99 °F) (Temporal)   Resp (!) 22   Ht 1.702 m (5' 7\")   Wt 72.6 kg (160 lb)   SpO2 99%   BMI 25.06 kg/m²     "

## 2023-08-10 NOTE — ED PROVIDER NOTES
ED Provider Note    CHIEF COMPLAINT  Chief Complaint   Patient presents with    Chest Pain    Shortness of Breath       EXTERNAL RECORDS REVIEWED  Outpatient Notes the patient was treated for UTI as an outpatient visit on May 23, 2023    HPI/ROS  LIMITATION TO HISTORY   Select: : None  OUTSIDE HISTORIAN(S):  Friend    Michel Angelo is a 27 y.o. female who presents 3 days status post mastectomy by Dr. Thao now with severe chest pain and shortness of breath.  She reports the chest pain feels like it is inside of her chest not where the incisions are.  She has 2 Chet-Christian drains that have been draining well.  She has had no fever.    PAST MEDICAL HISTORY   has a past medical history of Chronic constipation (4/8/2016), Dysmenorrhea (4/8/2016), Hypotension (2/21/2020), NEGATIVE HISTORY OF, and Neuritis- right scalp cutaneous x 5 days (9/22/2016).    SURGICAL HISTORY   has a past surgical history that includes knee arthroscopy (Left, 05/2018); rhinoplasty (03/2019); knee arthroscopy (Right, 8/22/2019); meniscectomy, knee, medial (Right, 8/22/2019); and meniscal repair (Right, 8/22/2019).    FAMILY HISTORY  Family History   Problem Relation Age of Onset    Hyperlipidemia Mother     Hyperlipidemia Father     Cancer Brother 14        adenocarcinoma- lung, carcinoid- appendix    Diabetes Maternal Grandmother     Breast Cancer Maternal Grandmother         postmenopausal    Genitourinary () Problems Other         cousin       SOCIAL HISTORY  Social History     Tobacco Use    Smoking status: Never    Smokeless tobacco: Never   Vaping Use    Vaping Use: Never used   Substance and Sexual Activity    Alcohol use: Yes     Comment: occasional    Drug use: Never    Sexual activity: Yes     Partners: Female       CURRENT MEDICATIONS  Home Medications       Reviewed by Amanda Sanchez R.N. (Registered Nurse) on 08/10/23 at 1358  Med List Status: Partial     Medication Last Dose Status   hydrOXYzine HCl (ATARAX) 25 MG  "Tab  Active   ibuprofen (MOTRIN) 800 MG Tab  Active                    ALLERGIES  Allergies   Allergen Reactions    Compazine [Prochlorperazine] Anxiety     \"I felt like I was crawling out of my skin\"    Gluten Meal Diarrhea and Vomiting     Diarrhea      Iodine Contrast [Diagnostic X-Ray Materials] Shortness of Breath     Tight chest    Gluten Meal     Iodine        PHYSICAL EXAM  VITAL SIGNS: /65   Pulse 79   Temp 37.2 °C (99 °F) (Temporal)   Resp 16   Ht 1.702 m (5' 7\")   Wt 72.6 kg (160 lb)   SpO2 96%   BMI 25.06 kg/m²    Constitutional: Alert.  HENT: No signs of trauma, Bilateral external ears normal, Nose normal.   Eyes: Pupils are equal and reactive, Conjunctiva normal, Non-icteric.   Neck: Normal range of motion, No tenderness, Supple, No stridor.   Lymphatic: No lymphadenopathy noted.   Cardiovascular: Regular rate and rhythm, no murmurs.   Thorax & Lungs: Normal breath sounds, No respiratory distress, No wheezing, Chet-Christian drains draining serosanguineous fluid.  Bandages in place.  Abdomen: Bowel sounds normal, Soft, No tenderness, No peritoneal signs, No masses, No pulsatile masses.   Skin: Warm, Dry, No erythema, No rash.   Extremities: Intact distal pulses, No edema, No tenderness, No cyanosis.  Musculoskeletal: Good range of motion in all major joints. No tenderness to palpation or major deformities noted.   Neurologic: Alert , Normal motor function, Normal sensory function, No focal deficits noted.   Psychiatric: Affect normal, Judgment normal, Mood normal.         DIAGNOSTIC STUDIES / PROCEDURES  EKG  I have independently interpreted this EKG  Sinus rhythm rate 83  Borderline Q waves in inferior leads  axis normal  intervals normal  Compared to ECG 12/23/2022 05:03:31  Sinus tachycardia no longer present  ST (T wave) deviation no longer present  My impression of this EKG, it does not indicate ischemia nor arrhythmia at this time    LABS  Labs Reviewed   CBC WITH DIFFERENTIAL    " Narrative:     Biotin intake of greater than 5 mg per day may interfere with  troponin levels, causing false low values.  Indicate which anticoagulants the patient is on:->UNKNOWN   COMP METABOLIC PANEL    Narrative:     Biotin intake of greater than 5 mg per day may interfere with  troponin levels, causing false low values.  Indicate which anticoagulants the patient is on:->UNKNOWN   TROPONIN    Narrative:     Biotin intake of greater than 5 mg per day may interfere with  troponin levels, causing false low values.  Indicate which anticoagulants the patient is on:->UNKNOWN   PROTHROMBIN TIME    Narrative:     Biotin intake of greater than 5 mg per day may interfere with  troponin levels, causing false low values.  Indicate which anticoagulants the patient is on:->UNKNOWN   APTT    Narrative:     Biotin intake of greater than 5 mg per day may interfere with  troponin levels, causing false low values.  Indicate which anticoagulants the patient is on:->UNKNOWN   HCG QUAL SERUM   ESTIMATED GFR    Narrative:     Biotin intake of greater than 5 mg per day may interfere with  troponin levels, causing false low values.  Indicate which anticoagulants the patient is on:->UNKNOWN         RADIOLOGY  I have independently interpreted the diagnostic imaging associated with this visit and am waiting the final reading from the radiologist.   My preliminary interpretation is as follows: No pneumothorax  Radiologist interpretation:     CT-CTA CHEST PULMONARY ARTERY W/ RECONS   Final Result      1.  No evidence of pulmonary embolism.   2.  Postsurgical changes of the bilateral breasts with small hematomas of the right breast. Surgical drains in place.                  COURSE & MEDICAL DECISION MAKING    ED Observation Status? Yes; I am placing the patient in to an observation status due to a diagnostic uncertainty as well as therapeutic intensity. Patient placed in observation status at 2:14 PM, 8/10/2023.     Observation plan is as  follows: Patient presents 3 days status post mastectomy with chest pain and shortness of breath.  She is tachycardic here.  Her father has a history of blood clots.  T scan was ordered to evaluate, the patient was given  mg Solu-Medrol and 50 mg IV Benadryl, she has a history of allergy to contrast.  She is given 4 mg IV morphine and 0.5 mg IV Ativan for pain and anxiety.    Upon Reevaluation, the patient's condition has: Improved; and will be discharged.    Patient discharged from ED Observation status at 4 PM (Time) August 10, 2023 (Date).     INITIAL ASSESSMENT, COURSE AND PLAN  Care Narrative:     The patient CT scan is negative for pulmonary embolism.  Her labs are unremarkable.  She feels improved after pain medication, the patient will be discharged with a prescription for Ativan to help her postop symptoms.  The patient return for worsening symptoms.    ADDITIONAL PROBLEM LIST  none  DISPOSITION AND DISCUSSIONS  I have discussed management of the patient with the following physicians and MICHAEL's:  none    Discussion of management with other QHP or appropriate source(s): None     Escalation of care considered, and ultimately not performed:acute inpatient care management, however at this time, the patient is most appropriate for outpatient management    Barriers to care at this time, including but not limited to:  none .     Decision tools and prescription drugs considered including, but not limited to:  In for anxiety medication .    I reviewed prescription monitoring program for patient's narcotic use before prescribing a scheduled drug.The patient will not drink alcohol nor drive with prescribed medications. The patient will return for new or worsening symptoms and is stable at the time of discharge.    The patient is referred to a primary physician for blood pressure management, diabetic screening, and for all other preventative health concerns.    In prescribing controlled substances to this patient,  I certify that I have obtained and reviewed the medical history of Michel Angelo. I have also made a good seun effort to obtain applicable records from other providers who have treated the patient and records did not demonstrate any increased risk of substance abuse that would prevent me from prescribing controlled substances.     I have conducted a physical exam and documented it. I have reviewed Ms. Angelo’s prescription history as maintained by the [place] Prescription Monitoring Program.     I have assessed the patient’s risk for abuse, dependency, and addiction using the validated Opioid Risk Tool available at https://www.mdcalc.com/tabwlg-pfsj-bgcw-ort-narcotic-abuse.     Given the above, I believe the benefits of controlled substance therapy outweigh the risks. The reasons for prescribing controlled substances include non-narcotic, oral analgesic alternatives have been inadequate for pain control. Accordingly, I have discussed the risk and benefits, treatment plan, and alternative therapies with the patient.       DISPOSITION:  Patient will be discharged home in stable condition.    FOLLOW UP:  [facility], Emergency Dept  [address] [facility] [id number] [phone]  Follow up  If symptoms worsen    Estefany Ramírez PBetzyA.-C.  [address]  [facility] [address] [phone]    Follow up  As needed    Lita Langston M.D.  [address] [facility] [address] [phone]    Follow up        OUTPATIENT MEDICATIONS:  New Prescriptions    LORAZEPAM (ATIVAN) 1 MG TAB    Take 1 Tablet by mouth every 6 hours as needed for Anxiety for up to 5 days.         FINAL DIAGNOSIS  1. Acute chest pain           Electronically signed by: Juan José oCok M.D., 8/10/2023 2:13 PM

## 2023-08-10 NOTE — ED NOTES
PIV established. Blood drawn and sent to lab for analysis. Pt provided with blanket and pillow. Significant other at bedside.

## 2023-09-19 ENCOUNTER — OFFICE VISIT (OUTPATIENT)
Dept: URGENT CARE | Facility: CLINIC | Age: 28
End: 2023-09-19
Payer: COMMERCIAL

## 2023-09-19 ENCOUNTER — HOSPITAL ENCOUNTER (OUTPATIENT)
Facility: MEDICAL CENTER | Age: 28
End: 2023-09-19
Attending: REGISTERED NURSE
Payer: COMMERCIAL

## 2023-09-19 ENCOUNTER — APPOINTMENT (OUTPATIENT)
Dept: RADIOLOGY | Facility: IMAGING CENTER | Age: 28
End: 2023-09-19
Attending: REGISTERED NURSE
Payer: COMMERCIAL

## 2023-09-19 VITALS
OXYGEN SATURATION: 99 % | TEMPERATURE: 97.1 F | WEIGHT: 155 LBS | HEART RATE: 72 BPM | BODY MASS INDEX: 24.33 KG/M2 | HEIGHT: 67 IN | SYSTOLIC BLOOD PRESSURE: 100 MMHG | RESPIRATION RATE: 18 BRPM | DIASTOLIC BLOOD PRESSURE: 58 MMHG

## 2023-09-19 DIAGNOSIS — R39.9 URINARY SYMPTOM OR SIGN: ICD-10-CM

## 2023-09-19 DIAGNOSIS — R10.30 LOWER ABDOMINAL PAIN: ICD-10-CM

## 2023-09-19 LAB
APPEARANCE UR: CLEAR
BILIRUB UR STRIP-MCNC: NORMAL MG/DL
COLOR UR AUTO: YELLOW
GLUCOSE UR STRIP.AUTO-MCNC: NORMAL MG/DL
KETONES UR STRIP.AUTO-MCNC: NORMAL MG/DL
LEUKOCYTE ESTERASE UR QL STRIP.AUTO: NORMAL
NITRITE UR QL STRIP.AUTO: NORMAL
PH UR STRIP.AUTO: 6.5 [PH] (ref 5–8)
POCT INT CON NEG: NEGATIVE
POCT INT CON POS: POSITIVE
POCT URINE PREGNANCY TEST: NEGATIVE
PROT UR QL STRIP: NORMAL MG/DL
RBC UR QL AUTO: NORMAL
SP GR UR STRIP.AUTO: 1.01
UROBILINOGEN UR STRIP-MCNC: 0.2 MG/DL

## 2023-09-19 PROCEDURE — 81002 URINALYSIS NONAUTO W/O SCOPE: CPT | Performed by: REGISTERED NURSE

## 2023-09-19 PROCEDURE — 3078F DIAST BP <80 MM HG: CPT | Performed by: REGISTERED NURSE

## 2023-09-19 PROCEDURE — 3074F SYST BP LT 130 MM HG: CPT | Performed by: REGISTERED NURSE

## 2023-09-19 PROCEDURE — 87086 URINE CULTURE/COLONY COUNT: CPT

## 2023-09-19 PROCEDURE — 81025 URINE PREGNANCY TEST: CPT | Performed by: REGISTERED NURSE

## 2023-09-19 PROCEDURE — 99214 OFFICE O/P EST MOD 30 MIN: CPT | Performed by: REGISTERED NURSE

## 2023-09-19 PROCEDURE — 74019 RADEX ABDOMEN 2 VIEWS: CPT | Mod: TC,FY | Performed by: REGISTERED NURSE

## 2023-09-19 ASSESSMENT — ENCOUNTER SYMPTOMS
MYALGIAS: 0
DIARRHEA: 0
PALPITATIONS: 0
DIZZINESS: 0
BLOOD IN STOOL: 0
HEADACHES: 0
BACK PAIN: 0
SHORTNESS OF BREATH: 0
FLANK PAIN: 0
SORE THROAT: 0
CHILLS: 0
FEVER: 0
VOMITING: 0

## 2023-09-19 ASSESSMENT — FIBROSIS 4 INDEX: FIB4 SCORE: 0.4

## 2023-09-19 NOTE — PROGRESS NOTES
"Subjective:   JOHNY HASSAN is a 27 y.o. female who presents for Painful Urination (X4 days, lower abdominal/rectal pressure, history of hemorrhoids, burning in urination)    HPI  4 days ago developed burning with urination, has some bladder pressure.  Does have some lower abdominal discomfort she describes as mild and pressure like, no pattern.  Her BM this morning was more difficult, had to strain.  Does have a hx of hemorrhoids and constipation. No vaginal discharge or odor. Hx of yeast infection, but this does not feel similar.  Is tolerating p.o.  No concern for STIs.    Review of Systems   Constitutional:  Negative for chills and fever.   HENT:  Negative for sore throat.    Respiratory:  Negative for shortness of breath.    Cardiovascular:  Negative for chest pain, palpitations and leg swelling.   Gastrointestinal:  Negative for blood in stool, diarrhea, melena and vomiting.   Genitourinary:  Negative for flank pain.   Musculoskeletal:  Negative for back pain and myalgias.   Skin:  Negative for rash.   Neurological:  Negative for dizziness and headaches.       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    Facial swelling 05/04/2023    Chronic night sweats 05/04/2023    Irregular menses 05/04/2023    Persistent headaches 03/21/2023    Breast complaint 12/22/2022    Hemorrhoids 08/19/2022    Chest pain 05/25/2022    Anxiety 05/25/2022    Vomiting without nausea 03/15/2022    Chronic hypotension 02/21/2020    Family history of hypercholesterolemia 03/30/2017    Family history of diabetes mellitus (DM) 03/30/2017    Family history of " "carcinoid tumor 04/08/2016       Current Outpatient Medications Ordered in Epic   Medication Sig Dispense Refill    hydrOXYzine HCl (ATARAX) 25 MG Tab Take 1 Tablet by mouth 3 times a day as needed for Anxiety. (Patient not taking: Reported on 9/19/2023) 30 Tablet 0    ibuprofen (MOTRIN) 800 MG Tab Take 1 Tablet by mouth every 8 hours as needed for Moderate Pain or Headache. (Patient not taking: Reported on 9/19/2023) 30 Tablet 1     No current Epic-ordered facility-administered medications on file.       Past Surgical History:   Procedure Laterality Date    KNEE ARTHROSCOPY Right 8/22/2019    Procedure: ARTHROSCOPY, KNEE;  Surgeon: Vinay Moore M.D.;  Location: SURGERY [facility];  Service: Orthopedics    MENISCECTOMY, KNEE, MEDIAL Right 8/22/2019    Procedure: MENISCECTOMY, KNEE, MEDIAL - PARTIAL VERSUS;  Surgeon: Vinay Moore M.D.;  Location: [facility];  Service: Orthopedics    MENISCAL REPAIR Right 8/22/2019    Procedure: REPAIR, MENISCUS, KNEE - PROCEED AS INDICATED;  Surgeon: Vinay Moore M.D.;  Location: SURGERY [facility];  Service: Orthopedics    RHINOPLASTY  03/2019    KNEE ARTHROSCOPY Left 05/2018       Social History     Tobacco Use    Smoking status: Never    Smokeless tobacco: Never   Vaping Use    Vaping Use: Never used   Substance Use Topics    Alcohol use: Not Currently     Comment: occasional    Drug use: Not Currently       family history includes Breast Cancer in her maternal grandmother; Cancer (age of onset: 14) in her brother; Diabetes in her maternal grandmother; Genitourinary () Problems in an other family member; Hyperlipidemia in her father and mother.     Problem list, medications, and allergies reviewed by myself today in Epic.     Objective:   /58 (BP Location: Left arm, Patient Position: Sitting, BP Cuff Size: Adult)   Pulse 72   Temp 36.2 °C (97.1 °F) (Temporal)   Resp 18   Ht 1.702 m (5' 7\")   Wt 70.3 kg (155 lb)  "  LMP 08/28/2023   SpO2 99%   BMI 24.28 kg/m²     Physical Exam  Vitals and nursing note reviewed.   Constitutional:       General: She is not in acute distress.     Appearance: Normal appearance. She is not ill-appearing, toxic-appearing or diaphoretic.   HENT:      Head: Normocephalic and atraumatic.      Right Ear: External ear normal.      Left Ear: External ear normal.      Nose: Nose normal.   Eyes:      Conjunctiva/sclera: Conjunctivae normal.   Cardiovascular:      Rate and Rhythm: Normal rate and regular rhythm.      Heart sounds: Normal heart sounds.   Pulmonary:      Effort: Pulmonary effort is normal. No respiratory distress.      Breath sounds: Normal breath sounds. No wheezing, rhonchi or rales.   Abdominal:      General: Abdomen is flat. There is no distension.      Palpations: Abdomen is soft.      Tenderness: There is no abdominal tenderness. There is no right CVA tenderness, left CVA tenderness, guarding or rebound.      Comments: No suprapubic tenderness   Musculoskeletal:         General: Normal range of motion.      Cervical back: Normal range of motion and neck supple.   Skin:     General: Skin is warm and dry.      Capillary Refill: Capillary refill takes less than 2 seconds.   Neurological:      General: No focal deficit present.      Mental Status: She is alert and oriented to person, place, and time. Mental status is at baseline.   Psychiatric:         Mood and Affect: Mood normal.         Behavior: Behavior normal.         Thought Content: Thought content normal.         Judgment: Judgment normal.         RADIOLOGY RESULTS   [id number] VIEWS    Result Date: 9/19/2023 9/19/2023 11:06 AM HISTORY/REASON FOR EXAM:  Abdominal Pain. TECHNIQUE/EXAM DESCRIPTION AND NUMBER OF VIEWS:  2 view(s) of the abdomen. COMPARISON: 8/28/2022. FINDINGS: A normal bowel gas pattern is present with a moderate colonic stool burden. There are no abnormal abdominal calcifications. The visualized portions of the  lung bases and cardiomediastinal silhouette are unremarkable. Imaged osseous structures are unremarkable.     Normal bowel gas pattern with a moderate colonic stool burden.           Assessment/Plan:     Diagnosis and associated orders:   1. Lower abdominal pain  [id number] VIEWS    POCT Urinalysis    URINE CULTURE(NEW)    POCT Pregnancy      2. Urinary symptom or sign  POCT Urinalysis    URINE CULTURE(NEW)    POCT Pregnancy         Comments/MDM:   Differential diagnosis discussed     Pleasant 27-year-old female presenting with lower abdominal pressure and straining with bowel movements.  Has noted some burning with urination as well.  No significant history of UTIs.  Does have a history of constipation as well as hemorrhoids.  She is tolerating p.o.  No OTC medications tried.  Thankfully vital signs are reassuring.  In clinic UA is unremarkable, hCG negative.  She does appear well, moist mucous membranes, no adventitious heart or lung sounds, no abdominal, suprapubic or CVA tenderness.  Given her reported burning with urination we will send urine for culture to rule out UTI.  Abdominal x-ray positive for moderate colonic stool burden, discussed bowel regimen given history of constipation.  Recommend increasing fluids.  Ambulation as tolerated to help promote bowel movements.  Reviewed at length signs and symptoms that require immediate attention.  We will modify her plan pending urine culture results if needed.    Return to clinic or go to ED if symptoms worsen or persist. Indications for ED discussed at length. Patient/Parent/Guardian voices understanding. Follow-up with your primary care provider in 3-5 days. Red flag symptoms discussed. All side effects of medication discussed including allergic response, GI upset, tendon injury, rash, sedation etc.    I personally reviewed prior external notes and test results pertinent to today's visit as well as additional imaging and testing completed in clinic today.      Please note that this dictation was created using voice recognition software. I have made every reasonable attempt to correct obvious errors, but I expect that there are errors of grammar and possibly content that I did not discover before finalizing the note.    This note was electronically signed by ANGIE Ward

## 2023-09-22 LAB
BACTERIA UR CULT: NORMAL
SIGNIFICANT IND 70042: NORMAL
SITE SITE: NORMAL
SOURCE SOURCE: NORMAL

## 2023-09-29 ENCOUNTER — OFFICE VISIT (OUTPATIENT)
Dept: MEDICAL GROUP | Facility: IMAGING CENTER | Age: 28
End: 2023-09-29
Payer: COMMERCIAL

## 2023-09-29 ENCOUNTER — HOSPITAL ENCOUNTER (OUTPATIENT)
Dept: RADIOLOGY | Facility: MEDICAL CENTER | Age: 28
End: 2023-09-29
Attending: PHYSICIAN ASSISTANT
Payer: COMMERCIAL

## 2023-09-29 VITALS
SYSTOLIC BLOOD PRESSURE: 108 MMHG | WEIGHT: 160.6 LBS | DIASTOLIC BLOOD PRESSURE: 70 MMHG | BODY MASS INDEX: 25.21 KG/M2 | HEIGHT: 67 IN | TEMPERATURE: 97.9 F | HEART RATE: 78 BPM | RESPIRATION RATE: 16 BRPM | OXYGEN SATURATION: 100 %

## 2023-09-29 DIAGNOSIS — R10.2 PELVIC PRESSURE IN FEMALE: ICD-10-CM

## 2023-09-29 PROCEDURE — 3074F SYST BP LT 130 MM HG: CPT | Performed by: PHYSICIAN ASSISTANT

## 2023-09-29 PROCEDURE — 74018 RADEX ABDOMEN 1 VIEW: CPT

## 2023-09-29 PROCEDURE — 99214 OFFICE O/P EST MOD 30 MIN: CPT | Performed by: PHYSICIAN ASSISTANT

## 2023-09-29 PROCEDURE — 3078F DIAST BP <80 MM HG: CPT | Performed by: PHYSICIAN ASSISTANT

## 2023-09-29 ASSESSMENT — FIBROSIS 4 INDEX: FIB4 SCORE: 0.4

## 2023-09-29 NOTE — ASSESSMENT & PLAN NOTE
Pt admits to persistent daily pelvic pressure x 2 weeks.  Patient rates it a 6 out of 10 pain. She went to urgent care and had an abd xray showing increased stool burden. Took Miralax for two days and did not notice any change with her pressure. Currently on day 2 of her menstrual cycle, has been having heavier menses with clots.  She tried getting a manipulation from her chiropractor without improvement of her discomfort.

## 2023-09-29 NOTE — PROGRESS NOTES
Subjective:     CC:   Chief Complaint   Patient presents with    Abdominal Pain     Pt reported pressure in the lower abdominal  Pt reported pressure in the rectum x 2 weeks  And constipation        HPI:   JOHNY presents today to discuss:    Pelvic pressure in female  Pt admits to persistent daily pelvic pressure x 2 weeks.  Patient rates it a 6 out of 10 pain. She went to urgent care and had an abd xray showing increased stool burden. Took Miralax for two days and did not notice any change with her pressure. Currently on day 2 of her menstrual cycle, has been having heavier menses with clots.  She tried getting a manipulation from her chiropractor without improvement of her discomfort.      Past Medical History:   Diagnosis Date    Chronic constipation 4/8/2016    Dysmenorrhea 4/8/2016    Hypotension 2/21/2020    -I suspect secondary to hypovolemia -We will run some serum studies -I have recommended that she increase from 2 of the 40 ounce flask's to 3 or 4 especially given that she works out for 1 hour a day 7 days a week -I also recommend considering electrolyte tabs either daily or every other day pending how she feels -I do not suspect inner ear issues from her recent viral illness -rto if symptoms do     NEGATIVE HISTORY OF     Neuritis- right scalp cutaneous x 5 days 9/22/2016     Family History   Problem Relation Age of Onset    Hyperlipidemia Mother     Hyperlipidemia Father     Cancer Brother 14        adenocarcinoma- lung, carcinoid- appendix    Diabetes Maternal Grandmother     Breast Cancer Maternal Grandmother         postmenopausal    Genitourinary () Problems Other         cousin     Past Surgical History:   Procedure Laterality Date    KNEE ARTHROSCOPY Right 8/22/2019    Procedure: ARTHROSCOPY, KNEE;  Surgeon: Vinay Moore M.D.;  Location: SURGERY [facility];  Service: Orthopedics    MENISCECTOMY, KNEE, MEDIAL Right 8/22/2019    Procedure: MENISCECTOMY, KNEE, MEDIAL - PARTIAL  "VERSUS;  Surgeon: Vinay Moore M.D.;  Location: SURGERY [facility];  Service: Orthopedics    MENISCAL REPAIR Right 8/22/2019    Procedure: REPAIR, MENISCUS, KNEE - PROCEED AS INDICATED;  Surgeon: Vinay Moore M.D.;  Location: SURGERY [facility];  Service: Orthopedics    RHINOPLASTY  03/2019    KNEE ARTHROSCOPY Left 05/2018     Social History     Tobacco Use    Smoking status: Never    Smokeless tobacco: Never   Vaping Use    Vaping Use: Never used   Substance Use Topics    Alcohol use: Not Currently     Comment: occasional    Drug use: Not Currently     Social History     Social History Narrative    Born in [place]    Raised in [place]    Has a female partner     No current [facility]-ordered outpatient medications on file.     No current [facility]-ordered facility-administered medications on file.     Compazine [prochlorperazine], Gluten meal, Iodine contrast [diagnostic x-ray materials], Gluten meal, and Iodine    PMH/PSH/FH/Social history reviewed.  Vaccinations discussed.  Previous records and labs reviewed. Discussed age appropriate anticipatory guidance.    ROS: see hpi  Gen: no fevers/chills  Pulm: no sob, no cough  CV: no chest pain, no palpitations, no edema  GI: no nausea/vomiting, no diarrhea  Skin: no rash    Objective:   Exam:  /70 (BP Location: Left arm, Patient Position: Sitting, BP Cuff Size: Adult)   Pulse 78   Temp 36.6 °C (97.9 °F) (Temporal)   Resp 16   Ht 1.702 m (5' 7\")   Wt 72.8 kg (160 lb 9.6 oz)   LMP 08/28/2023   SpO2 100%   BMI 25.15 kg/m²    Body mass index is 25.15 kg/m².    Gen: Alert and oriented, No apparent distress.  HEENT: Head atraumatic, normocephalic. Pupils equal and round.  Lungs: Normal effort, CTA bilaterally, no wheezes, rhonchi, or rales  CV: Regular rate and rhythm. No murmurs, rubs, or gallops.  ABD: +BS. Non-tender, non-distended. No rebound, rigidity, or guarding.  Rectal: Not enlarged, nonthrombosed external hemorrhoid noted at 6 " o'clock position.  Internal exam with fecal ball palpated.  Ext: No clubbing, cyanosis, edema.    Assessment & Plan:     27 y.o. female with the following -     1. Pelvic pressure in female  We will repeat abdominal x-ray to assess stool burden and bowel gas pattern.  Further recommendations pending results.  May need an enema.  Check pelvic ultrasound to assess uterus and ovaries.  If no improvement and if studies inconclusive, then will order CT pelvis.  GI referral already placed.  - US-PELVIC COMPLETE (TRANSABDOMINAL/TRANSVAGINAL) (COMBO); Future  - [id number] VIEW; Future    Return for Will notify patient to follow-up pending tests.    My total time spent caring for the patient on the day of the encounter was 30 minutes.   This does not include time spent on separately billable procedures/tests.      Estefany Ramírez PA-C ([place])  Physician Assistant Certified  [facility]    Please note that this dictation was created using voice recognition software. I have made every reasonable attempt to correct obvious errors, but I expect that there are errors of grammar and possibly content that I did not discover before finalizing the note.

## 2023-09-29 NOTE — PATIENT INSTRUCTIONS
It was a pleasure meeting with you today at [facility]!    Your medical history/records and medications were reviewed today.     UPDATE on MyChart Results: If you have blood work, and/or imaging studies, or any other test or procedure completed, you will have access to results as soon as they become available in MyChart. Recently, these results will be available for review at the same time that your provider is able to see results!    This will likely mean you will see a result before your provider has had a chance to review and discuss with you.  Some results or care notes may be hard to understand and may be serious in nature.    We look at every result and your provider will contact you to explain what they mean and discuss appropriate next steps. Please allow for at least 72 business hours for chart and result review.     We prefer that you wait for your care team to contact you with your results.  Often, your provider will discuss your results with you at your next appointment. We look forward to continuing to partner with you in your care.    Please review my practice information below:    If you have any prescription refill requests, please send them via [employer] or discuss with your provider at the start of your office visits. Please allow 3-5 business days for lab and testing review and you will be contacted via [employer] with those results, or if advised to make a follow up appointment regarding those results, then please do so.     Once resulted, your lab/test/imaging results will show up automatically in your MyChart. Please wait for my interpretation and recommendations prior to viewing your results to avoid any unnecessary confusion or misinterpretation. I will address all of the lab values that I interpret as abnormal and message you accordingly on your MyChart. I will always send you a message about your results even if they are normal. If you do not hear back from me within 5-7 business  days after completing your tests, then please send me a message on POS on CLOUD so I can obtain your results (especially if you went to an outside lab or imaging center - [facility], [facility], etc).     If you have any additional questions or concerns beyond my interpretation of your results, please make an appointment with me to discuss in further detail.    Please only use the POS on CLOUD messaging system for questions regarding your most recent appointment or if advised to use otherwise (glucose or blood pressure reporting).     If you have any new problems or concerns, you must make an appointment to discuss. This includes any referral requests, lab requests (unless advised to notify me for pre-appt labs), medication side effects, or request for medication adjustments.     Please arrive 15 minutes prior to your appointment time to complete your check-in and intake with the medical assistant.      Thank you,    Estefany Ramírez PA-C (Baker)  Physician Assistant Certified  [facility]    -----------------------------------------------------------------    Attn: Patients of [facility]:    In an effort to continue to provide excellent and efficient care to our patients, it is vital that we continue to use our resources appropriately. With that, this is a reminder that POS on CLOUD is used for prescription refill requests, test results, virtual visits, and chart review only.     Any new questions, concerns/conditions, lab/imaging requests, medication adjustments, new prescriptions, or referral requests do require an appointment (virtually or in person), unless discussed otherwise at your most recent appointment.     Thank you for your understanding,    [facility]

## 2023-10-03 DIAGNOSIS — R39.15 URINARY URGENCY: ICD-10-CM

## 2023-10-03 DIAGNOSIS — R10.2 PELVIC PRESSURE IN FEMALE: ICD-10-CM

## 2023-10-03 DIAGNOSIS — K59.00 CONSTIPATION, UNSPECIFIED CONSTIPATION TYPE: ICD-10-CM

## 2023-10-05 ENCOUNTER — HOSPITAL ENCOUNTER (OUTPATIENT)
Dept: RADIOLOGY | Facility: MEDICAL CENTER | Age: 28
End: 2023-10-05
Attending: CHIROPRACTOR
Payer: COMMERCIAL

## 2023-10-05 DIAGNOSIS — R10.2 PELVIC PRESSURE IN FEMALE: ICD-10-CM

## 2023-10-05 DIAGNOSIS — K59.00 CONSTIPATION, UNSPECIFIED CONSTIPATION TYPE: ICD-10-CM

## 2023-10-05 DIAGNOSIS — G83.4 CAUDA EQUINA SYNDROME WITH NEUROGENIC BLADDER (HCC): ICD-10-CM

## 2023-10-05 PROCEDURE — 72148 MRI LUMBAR SPINE W/O DYE: CPT

## 2023-10-19 ENCOUNTER — OFFICE VISIT (OUTPATIENT)
Dept: MEDICAL GROUP | Facility: IMAGING CENTER | Age: 28
End: 2023-10-19
Payer: COMMERCIAL

## 2023-10-19 VITALS
HEART RATE: 88 BPM | SYSTOLIC BLOOD PRESSURE: 106 MMHG | WEIGHT: 157 LBS | HEIGHT: 67 IN | DIASTOLIC BLOOD PRESSURE: 62 MMHG | OXYGEN SATURATION: 98 % | TEMPERATURE: 97.3 F | BODY MASS INDEX: 24.64 KG/M2

## 2023-10-19 DIAGNOSIS — R19.8 IRREGULAR BOWEL HABITS: ICD-10-CM

## 2023-10-19 PROBLEM — R10.2 PELVIC PRESSURE IN FEMALE: Status: RESOLVED | Noted: 2023-09-29 | Resolved: 2023-10-19

## 2023-10-19 PROBLEM — R35.0 URINARY FREQUENCY: Status: RESOLVED | Noted: 2023-05-23 | Resolved: 2023-10-19

## 2023-10-19 PROBLEM — R07.9 CHEST PAIN: Status: RESOLVED | Noted: 2022-05-25 | Resolved: 2023-10-19

## 2023-10-19 PROBLEM — R11.11 VOMITING WITHOUT NAUSEA: Status: RESOLVED | Noted: 2022-03-15 | Resolved: 2023-10-19

## 2023-10-19 PROCEDURE — 99214 OFFICE O/P EST MOD 30 MIN: CPT | Performed by: PHYSICIAN ASSISTANT

## 2023-10-19 PROCEDURE — 3078F DIAST BP <80 MM HG: CPT | Performed by: PHYSICIAN ASSISTANT

## 2023-10-19 PROCEDURE — 3074F SYST BP LT 130 MM HG: CPT | Performed by: PHYSICIAN ASSISTANT

## 2023-10-19 RX ORDER — DICYCLOMINE HYDROCHLORIDE 10 MG/1
10 CAPSULE ORAL
Qty: 120 CAPSULE | Refills: 0 | Status: SHIPPED | OUTPATIENT
Start: 2023-10-19 | End: 2024-03-12

## 2023-10-19 ASSESSMENT — FIBROSIS 4 INDEX: FIB4 SCORE: 0.42

## 2023-10-19 NOTE — ASSESSMENT & PLAN NOTE
Pt c/o irregular bowel habits over the past year. She will have months where she has constipation and bloating, and then other months where it is mainly diarrhea. She has tried making dietary changes, low FODMAP, low dairy. She is now taking Miralax daily with more regularity. Still needs to make an appt with GI. No GERD or dysphagia. No travel outside the country. She does work in healthcare.

## 2023-10-19 NOTE — PROGRESS NOTES
Subjective:     CC:   Chief Complaint   Patient presents with    Follow-Up       HPI:   Michel presents today to discuss:    Irregular bowel habits  Pt c/o irregular bowel habits over the past year. She will have months where she has constipation and bloating, and then other months where it is mainly diarrhea. She has tried making dietary changes, low FODMAP, low dairy. She is now taking Miralax daily with more regularity. Still needs to make an appt with GI. No GERD or dysphagia. No travel outside the country. She does work in healthcare.       Past Medical History:   Diagnosis Date    Chronic constipation 4/8/2016    Dysmenorrhea 4/8/2016    Hypotension 2/21/2020    -I suspect secondary to hypovolemia -We will run some serum studies -I have recommended that she increase from 2 of the 40 ounce flask's to 3 or 4 especially given that she works out for 1 hour a day 7 days a week -I also recommend considering electrolyte tabs either daily or every other day pending how she feels -I do not suspect inner ear issues from her recent viral illness -rto if symptoms do     NEGATIVE HISTORY OF     Neuritis- right scalp cutaneous x 5 days 9/22/2016     Family History   Problem Relation Age of Onset    Hyperlipidemia Mother     Hyperlipidemia Father     Cancer Brother 14        adenocarcinoma- lung, carcinoid- appendix    Diabetes Maternal Grandmother     Breast Cancer Maternal Grandmother         postmenopausal    Genitourinary () Problems Other         cousin     Past Surgical History:   Procedure Laterality Date    KNEE ARTHROSCOPY Right 8/22/2019    Procedure: ARTHROSCOPY, KNEE;  Surgeon: Vinay Moore M.D.;  Location: SURGERY [facility];  Service: Orthopedics    MENISCECTOMY, KNEE, MEDIAL Right 8/22/2019    Procedure: MENISCECTOMY, KNEE, MEDIAL - PARTIAL VERSUS;  Surgeon: Vinay Moore M.D.;  Location: [facility];  Service: Orthopedics    MENISCAL REPAIR Right 8/22/2019     "Procedure: REPAIR, MENISCUS, KNEE - PROCEED AS INDICATED;  Surgeon: Vinay Moore M.D.;  Location: SURGERY [facility];  Service: Orthopedics    RHINOPLASTY  03/2019    KNEE ARTHROSCOPY Left 05/2018     Social History     Tobacco Use    Smoking status: Never    Smokeless tobacco: Never   Vaping Use    Vaping Use: Never used   Substance Use Topics    Alcohol use: Not Currently     Comment: occasional    Drug use: Not Currently     Social History     Social History Narrative    Born in [place]    Raised in [place]    Has a female partner     Current Outpatient Medications Ordered in Epic   Medication Sig Dispense Refill    dicyclomine (BENTYL) 10 MG Cap Take 1 Capsule by mouth 4 Times a Day,Before Meals and at Bedtime. 120 Capsule 0     No current Epic-ordered facility-administered medications on file.     Compazine [prochlorperazine], Gluten meal, Iodine contrast [diagnostic x-ray materials], Gluten meal, and Iodine    PMH/PSH/FH/Social history reviewed.  Vaccinations discussed.  Previous records and labs reviewed. Discussed age appropriate anticipatory guidance.    ROS: see hpi  Gen: no fevers/chills  Pulm: no sob, no cough  CV: no chest pain, no palpitations, no edema  GI: no nausea/vomiting, no diarrhea  Skin: no rash    Objective:   Exam:  /62 (BP Location: Right arm, Patient Position: Sitting, BP Cuff Size: Adult long)   Pulse 88   Temp 36.3 °C (97.3 °F) (Temporal)   Ht 1.702 m (5' 7\")   Wt 71.2 kg (157 lb)   LMP 08/28/2023   SpO2 98%   BMI 24.59 kg/m²    Body mass index is 24.59 kg/m².    Gen: Alert and oriented, No apparent distress.  HEENT: Head atraumatic, normocephalic. Pupils equal and round.  Neck: Neck is supple without lymphadenopathy.   Lungs: Normal effort, CTA bilaterally, no wheezes, rhonchi, or rales  CV: Regular rate and rhythm. No murmurs, rubs, or gallops.  ABD: +BS. Non-tender, non-distended. No rebound, rigidity, or guarding.  Ext: No clubbing, cyanosis, " edema.    Assessment & Plan:     28 y.o. female with the following -     1. Irregular bowel habits  Suspect IBS, but will rule out other etiology. See below. Will trial Bentyl for abdominal cramping/gas.  - IRON/TOTAL IRON BIND; Future  - FERRITIN; Future  - CBC WITH DIFFERENTIAL; Future  - ANTI-THYROID ANTIBODIES; Future  - T3 FREE; Future  - FREE THYROXINE; Future  - TSH; Future  - EBV ACUTE INFECTION AB PANEL; Future  - CELIAC DISEASE AB PANEL; Future  - CALPROTECTIN,FECAL; Future  - Complete O&P; Future  - CRYPTO/GIARDIA RAPID ASSAY; Future  - CULTURE STOOL; Future  - H.PYLORI STOOL ANTIGEN; Future  - Comp Metabolic Panel; Future  - GAMMA GT (GGT); Future  - dicyclomine (BENTYL) 10 MG Cap; Take 1 Capsule by mouth 4 Times a Day,Before Meals and at Bedtime.  Dispense: 120 Capsule; Refill: 0  - Sed Rate; Future  - CRP QUANTITIVE (NON-CARDIAC); Future    Return for Will notify patient to follow-up pending tests.    Estefany Guerrier) Darrell PALMA  Physician Assistant Certified  [facility]    Please note that this dictation was created using voice recognition software. I have made every reasonable attempt to correct obvious errors, but I expect that there are errors of grammar and possibly content that I did not discover before finalizing the note.

## 2023-10-19 NOTE — PATIENT INSTRUCTIONS
It was a pleasure meeting with you today at [facility]!    Your medical history/records and medications were reviewed today.     UPDATE on MyChart Results: If you have blood work, and/or imaging studies, or any other test or procedure completed, you will have access to results as soon as they become available in MyChart. Recently, these results will be available for review at the same time that your provider is able to see results!    This will likely mean you will see a result before your provider has had a chance to review and discuss with you.  Some results or care notes may be hard to understand and may be serious in nature.    We look at every result and your provider will contact you to explain what they mean and discuss appropriate next steps. Please allow for at least 72 business hours for chart and result review.     We prefer that you wait for your care team to contact you with your results.  Often, your provider will discuss your results with you at your next appointment. We look forward to continuing to partner with you in your care.    Please review my practice information below:    If you have any prescription refill requests, please send them via Typeform or discuss with your provider at the start of your office visits. Please allow 3-5 business days for lab and testing review and you will be contacted via Typeform with those results, or if advised to make a follow up appointment regarding those results, then please do so.     Once resulted, your lab/test/imaging results will show up automatically in your MyChart. Please wait for my interpretation and recommendations prior to viewing your results to avoid any unnecessary confusion or misinterpretation. I will address all of the lab values that I interpret as abnormal and message you accordingly on your MyChart. I will always send you a message about your results even if they are normal. If you do not hear back from me within 5-7 business  days after completing your tests, then please send me a message on [employer] so I can obtain your results (especially if you went to an outside lab or imaging center - [facility], [facility], etc).     If you have any additional questions or concerns beyond my interpretation of your results, please make an appointment with me to discuss in further detail.    Please only use the [employer] messaging system for questions regarding your most recent appointment or if advised to use otherwise (glucose or blood pressure reporting).     If you have any new problems or concerns, you must make an appointment to discuss. This includes any referral requests, lab requests (unless advised to notify me for pre-appt labs), medication side effects, or request for medication adjustments.     Please arrive 15 minutes prior to your appointment time to complete your check-in and intake with the medical assistant.      Thank you,    Estefany Ramírez PA-C (Baker)  Physician Assistant Certified  [facility]    -----------------------------------------------------------------    Attn: Patients of [facility]:    In an effort to continue to provide excellent and efficient care to our patients, it is vital that we continue to use our resources appropriately. With that, this is a reminder that Perfect [employer] is used for prescription refill requests, test results, virtual visits, and chart review only.     Any new questions, concerns/conditions, lab/imaging requests, medication adjustments, new prescriptions, or referral requests do require an appointment (virtually or in person), unless discussed otherwise at your most recent appointment.     Thank you for your understanding,    [facility]

## 2023-10-30 ENCOUNTER — HOSPITAL ENCOUNTER (OUTPATIENT)
Dept: LAB | Facility: MEDICAL CENTER | Age: 28
End: 2023-10-30
Attending: PHYSICIAN ASSISTANT
Payer: COMMERCIAL

## 2023-10-30 DIAGNOSIS — R19.8 IRREGULAR BOWEL HABITS: ICD-10-CM

## 2023-10-30 LAB
ALBUMIN SERPL BCP-MCNC: 4.5 G/DL (ref 3.2–4.9)
ALBUMIN/GLOB SERPL: 1.6 G/DL
ALP SERPL-CCNC: 50 U/L (ref 30–99)
ALT SERPL-CCNC: 49 U/L (ref 2–50)
ANION GAP SERPL CALC-SCNC: 12 MMOL/L (ref 7–16)
AST SERPL-CCNC: 34 U/L (ref 12–45)
BASOPHILS # BLD AUTO: 0.2 % (ref 0–1.8)
BASOPHILS # BLD: 0.01 K/UL (ref 0–0.12)
BILIRUB SERPL-MCNC: 0.3 MG/DL (ref 0.1–1.5)
BUN SERPL-MCNC: 15 MG/DL (ref 8–22)
CALCIUM ALBUM COR SERPL-MCNC: 9.2 MG/DL (ref 8.5–10.5)
CALCIUM SERPL-MCNC: 9.6 MG/DL (ref 8.5–10.5)
CHLORIDE SERPL-SCNC: 104 MMOL/L (ref 96–112)
CO2 SERPL-SCNC: 24 MMOL/L (ref 20–33)
CREAT SERPL-MCNC: 0.82 MG/DL (ref 0.5–1.4)
EOSINOPHIL # BLD AUTO: 0.05 K/UL (ref 0–0.51)
EOSINOPHIL NFR BLD: 1.2 % (ref 0–6.9)
ERYTHROCYTE [DISTWIDTH] IN BLOOD BY AUTOMATED COUNT: 38.3 FL (ref 35.9–50)
FERRITIN SERPL-MCNC: 107 NG/ML (ref 10–291)
GFR SERPLBLD CREATININE-BSD FMLA CKD-EPI: 101 ML/MIN/1.73 M 2
GGT SERPL-CCNC: 14 U/L (ref 7–34)
GLOBULIN SER CALC-MCNC: 2.9 G/DL (ref 1.9–3.5)
GLUCOSE SERPL-MCNC: 80 MG/DL (ref 65–99)
HCT VFR BLD AUTO: 40.7 % (ref 37–47)
HGB BLD-MCNC: 13.4 G/DL (ref 12–16)
IMM GRANULOCYTES # BLD AUTO: 0.02 K/UL (ref 0–0.11)
IMM GRANULOCYTES NFR BLD AUTO: 0.5 % (ref 0–0.9)
IRON SATN MFR SERPL: 32 % (ref 15–55)
IRON SERPL-MCNC: 89 UG/DL (ref 40–170)
LYMPHOCYTES # BLD AUTO: 1.83 K/UL (ref 1–4.8)
LYMPHOCYTES NFR BLD: 42.6 % (ref 22–41)
MCH RBC QN AUTO: 28.7 PG (ref 27–33)
MCHC RBC AUTO-ENTMCNC: 32.9 G/DL (ref 32.2–35.5)
MCV RBC AUTO: 87.2 FL (ref 81.4–97.8)
MONOCYTES # BLD AUTO: 0.43 K/UL (ref 0–0.85)
MONOCYTES NFR BLD AUTO: 10 % (ref 0–13.4)
NEUTROPHILS # BLD AUTO: 1.96 K/UL (ref 1.82–7.42)
NEUTROPHILS NFR BLD: 45.5 % (ref 44–72)
NRBC # BLD AUTO: 0 K/UL
NRBC BLD-RTO: 0 /100 WBC (ref 0–0.2)
PLATELET # BLD AUTO: 299 K/UL (ref 164–446)
PMV BLD AUTO: 12.6 FL (ref 9–12.9)
POTASSIUM SERPL-SCNC: 4.3 MMOL/L (ref 3.6–5.5)
PROT SERPL-MCNC: 7.4 G/DL (ref 6–8.2)
RBC # BLD AUTO: 4.67 M/UL (ref 4.2–5.4)
SODIUM SERPL-SCNC: 140 MMOL/L (ref 135–145)
T3FREE SERPL-MCNC: 4.78 PG/ML (ref 2–4.4)
T4 FREE SERPL-MCNC: 1.37 NG/DL (ref 0.93–1.7)
TIBC SERPL-MCNC: 282 UG/DL (ref 250–450)
TSH SERPL DL<=0.005 MIU/L-ACNC: 1.04 UIU/ML (ref 0.38–5.33)
UIBC SERPL-MCNC: 193 UG/DL (ref 110–370)
WBC # BLD AUTO: 4.3 K/UL (ref 4.8–10.8)

## 2023-10-30 PROCEDURE — 84443 ASSAY THYROID STIM HORMONE: CPT

## 2023-10-30 PROCEDURE — 36415 COLL VENOUS BLD VENIPUNCTURE: CPT

## 2023-10-30 PROCEDURE — 86364 TISS TRNSGLTMNASE EA IG CLAS: CPT

## 2023-10-30 PROCEDURE — 86663 EPSTEIN-BARR ANTIBODY: CPT

## 2023-10-30 PROCEDURE — 85025 COMPLETE CBC W/AUTO DIFF WBC: CPT

## 2023-10-30 PROCEDURE — 84439 ASSAY OF FREE THYROXINE: CPT

## 2023-10-30 PROCEDURE — 86800 THYROGLOBULIN ANTIBODY: CPT

## 2023-10-30 PROCEDURE — 86664 EPSTEIN-BARR NUCLEAR ANTIGEN: CPT

## 2023-10-30 PROCEDURE — 83550 IRON BINDING TEST: CPT

## 2023-10-30 PROCEDURE — 86376 MICROSOMAL ANTIBODY EACH: CPT

## 2023-10-30 PROCEDURE — 80053 COMPREHEN METABOLIC PANEL: CPT

## 2023-10-30 PROCEDURE — 82728 ASSAY OF FERRITIN: CPT

## 2023-10-30 PROCEDURE — 84481 FREE ASSAY (FT-3): CPT

## 2023-10-30 PROCEDURE — 83540 ASSAY OF IRON: CPT

## 2023-10-30 PROCEDURE — 86665 EPSTEIN-BARR CAPSID VCA: CPT | Mod: 91

## 2023-10-30 PROCEDURE — 82977 ASSAY OF GGT: CPT

## 2023-10-30 PROCEDURE — 82784 ASSAY IGA/IGD/IGG/IGM EACH: CPT

## 2023-11-01 ENCOUNTER — HOSPITAL ENCOUNTER (OUTPATIENT)
Dept: LAB | Facility: MEDICAL CENTER | Age: 28
End: 2023-11-01
Attending: PHYSICIAN ASSISTANT
Payer: COMMERCIAL

## 2023-11-01 DIAGNOSIS — R19.8 IRREGULAR BOWEL HABITS: ICD-10-CM

## 2023-11-01 LAB
EBV EA-D IGG SER-ACNC: 109 U/ML (ref 0–10.9)
EBV NA IGG SER IA-ACNC: 117 U/ML (ref 0–21.9)
EBV VCA IGG SER IA-ACNC: 120 U/ML (ref 0–21.9)
EBV VCA IGM SER IA-ACNC: 33 U/ML (ref 0–43.9)
G LAMBLIA+C PARVUM AG STL QL RAPID: NORMAL
H PYLORI AG STL QL IA: NOT DETECTED
IGA SERPL-MCNC: 121 MG/DL (ref 68–408)
SIGNIFICANT IND 70042: NORMAL
SITE SITE: NORMAL
SOURCE SOURCE: NORMAL
THYROGLOB AB SERPL-ACNC: <0.9 IU/ML (ref 0–4)
THYROPEROXIDASE AB SERPL-ACNC: 3.1 IU/ML (ref 0–9)
TTG IGA SER IA-ACNC: <2 U/ML (ref 0–3)

## 2023-11-01 PROCEDURE — 83993 ASSAY FOR CALPROTECTIN FECAL: CPT

## 2023-11-01 PROCEDURE — 87177 OVA AND PARASITES SMEARS: CPT

## 2023-11-01 PROCEDURE — 87899 AGENT NOS ASSAY W/OPTIC: CPT

## 2023-11-01 PROCEDURE — 87328 CRYPTOSPORIDIUM AG IA: CPT

## 2023-11-01 PROCEDURE — 87209 SMEAR COMPLEX STAIN: CPT

## 2023-11-01 PROCEDURE — 87045 FECES CULTURE AEROBIC BACT: CPT

## 2023-11-01 PROCEDURE — 87338 HPYLORI STOOL AG IA: CPT

## 2023-11-01 PROCEDURE — 87329 GIARDIA AG IA: CPT

## 2023-11-01 PROCEDURE — 87046 STOOL CULTR AEROBIC BACT EA: CPT

## 2023-11-03 LAB
E COLI SXT1+2 STL IA: NORMAL
SIGNIFICANT IND 70042: NORMAL
SITE SITE: NORMAL
SOURCE SOURCE: NORMAL

## 2023-11-04 LAB
BACTERIA STL CULT: NORMAL
E COLI SXT1+2 STL IA: NORMAL
SIGNIFICANT IND 70042: NORMAL
SITE SITE: NORMAL
SOURCE SOURCE: NORMAL

## 2023-11-05 LAB — CALPROTECTIN STL-MCNT: 33 UG/G

## 2023-11-09 LAB — OVA AND PARASITE, FECAL INTERPRETATION Q0595: NEGATIVE

## 2023-11-30 ENCOUNTER — OFFICE VISIT (OUTPATIENT)
Dept: MEDICAL GROUP | Facility: IMAGING CENTER | Age: 28
End: 2023-11-30
Payer: COMMERCIAL

## 2023-11-30 VITALS
DIASTOLIC BLOOD PRESSURE: 66 MMHG | RESPIRATION RATE: 16 BRPM | SYSTOLIC BLOOD PRESSURE: 110 MMHG | WEIGHT: 160 LBS | HEART RATE: 86 BPM | OXYGEN SATURATION: 100 % | BODY MASS INDEX: 25.11 KG/M2 | TEMPERATURE: 98 F | HEIGHT: 67 IN

## 2023-11-30 DIAGNOSIS — R79.89 ABNORMAL THYROID BLOOD TEST: ICD-10-CM

## 2023-11-30 DIAGNOSIS — R76.8 EBV SEROPOSITIVITY: ICD-10-CM

## 2023-11-30 PROCEDURE — 1126F AMNT PAIN NOTED NONE PRSNT: CPT | Performed by: PHYSICIAN ASSISTANT

## 2023-11-30 PROCEDURE — 99213 OFFICE O/P EST LOW 20 MIN: CPT | Performed by: PHYSICIAN ASSISTANT

## 2023-11-30 PROCEDURE — 3074F SYST BP LT 130 MM HG: CPT | Performed by: PHYSICIAN ASSISTANT

## 2023-11-30 PROCEDURE — 3078F DIAST BP <80 MM HG: CPT | Performed by: PHYSICIAN ASSISTANT

## 2023-11-30 ASSESSMENT — FIBROSIS 4 INDEX: FIB4 SCORE: 0.42

## 2023-11-30 ASSESSMENT — PAIN SCALES - GENERAL: PAINLEVEL: NO PAIN

## 2023-11-30 NOTE — PATIENT INSTRUCTIONS
It was a pleasure meeting with you today at [facility]!    Your medical history/records and medications were reviewed today.     UPDATE on MyChart Results: If you have blood work, and/or imaging studies, or any other test or procedure completed, you will have access to results as soon as they become available in MyChart. Recently, these results will be available for review at the same time that your provider is able to see results!    This will likely mean you will see a result before your provider has had a chance to review and discuss with you.  Some results or care notes may be hard to understand and may be serious in nature.    We look at every result and your provider will contact you to explain what they mean and discuss appropriate next steps. Please allow for at least 72 business hours for chart and result review.     We prefer that you wait for your care team to contact you with your results.  Often, your provider will discuss your results with you at your next appointment. We look forward to continuing to partner with you in your care.    Please review my practice information below:    If you have any prescription refill requests, please send them via [employer] or discuss with your provider at the start of your office visits. Please allow 3-5 business days for lab and testing review and you will be contacted via Venuemob with those results, or if advised to make a follow up appointment regarding those results, then please do so.     Once resulted, your lab/test/imaging results will show up automatically in your MyChart. Please wait for my interpretation and recommendations prior to viewing your results to avoid any unnecessary confusion or misinterpretation. I will address all of the lab values that I interpret as abnormal and message you accordingly on your MyChart. I will always send you a message about your results even if they are normal. If you do not hear back from me within 5-7 business  days after completing your tests, then please send me a message on Ticketbud so I can obtain your results (especially if you went to an outside lab or imaging center - [facility], [facility], etc).     If you have any additional questions or concerns beyond my interpretation of your results, please make an appointment with me to discuss in further detail.    Please only use the Ticketbud messaging system for questions regarding your most recent appointment or if advised to use otherwise (glucose or blood pressure reporting).     If you have any new problems or concerns, you must make an appointment to discuss. This includes any referral requests, lab requests (unless advised to notify me for pre-appt labs), medication side effects, or request for medication adjustments.     Please arrive 15 minutes prior to your appointment time to complete your check-in and intake with the medical assistant.      Thank you,    Estefany Ramírez PA-C (Baker)  Physician Assistant Certified  [facility]    -----------------------------------------------------------------    Attn: Patients of [facility]:    In an effort to continue to provide excellent and efficient care to our patients, it is vital that we continue to use our resources appropriately. With that, this is a reminder that T[employer] is used for prescription refill requests, test results, virtual visits, and chart review only.     Any new questions, concerns/conditions, lab/imaging requests, medication adjustments, new prescriptions, or referral requests do require an appointment (virtually or in person), unless discussed otherwise at your most recent appointment.     Thank you for your understanding,    [facility]

## 2023-11-30 NOTE — PROGRESS NOTES
Subjective:     CC:   Chief Complaint   Patient presents with    Follow-Up     On lab results from 10/31/23       HPI:   Michel presents today to discuss:    EBV seropositivity  Patient tested positive for reactivated EBV versus acute resolving infection.  She has been taking vitamin regimen of vitamin D, B complex, vitamin D, L-lysine.  She has been working on getting adequate sleep, as well as routine exercise.  She states her bowel concerns have improved.  Her fatigue and brain fog are also getting better.  In addition to the reactivated EBV, she was also found to have borderline elevated free T3 level with normal TSH.      Past Medical History:   Diagnosis Date    Chronic constipation 4/8/2016    Dysmenorrhea 4/8/2016    Hypotension 2/21/2020    -I suspect secondary to hypovolemia -We will run some serum studies -I have recommended that she increase from 2 of the 40 ounce flask's to 3 or 4 especially given that she works out for 1 hour a day 7 days a week -I also recommend considering electrolyte tabs either daily or every other day pending how she feels -I do not suspect inner ear issues from her recent viral illness -rto if symptoms do     NEGATIVE HISTORY OF     Neuritis- right scalp cutaneous x 5 days 9/22/2016     Family History   Problem Relation Age of Onset    Hyperlipidemia Mother     Hyperlipidemia Father     Cancer Brother 14        adenocarcinoma- lung, carcinoid- appendix    Diabetes Maternal Grandmother     Breast Cancer Maternal Grandmother         postmenopausal    Genitourinary () Problems Other         cousin     Past Surgical History:   Procedure Laterality Date    KNEE ARTHROSCOPY Right 8/22/2019    Procedure: ARTHROSCOPY, KNEE;  Surgeon: Vinay Moore M.D.;  Location: SURGERY [facility];  Service: Orthopedics    MENISCECTOMY, KNEE, MEDIAL Right 8/22/2019    Procedure: MENISCECTOMY, KNEE, MEDIAL - PARTIAL VERSUS;  Surgeon: Vinay Moore M.D.;  Location: SURGERY  "[facility];  Service: Orthopedics    MENISCAL REPAIR Right 8/22/2019    Procedure: REPAIR, MENISCUS, KNEE - PROCEED AS INDICATED;  Surgeon: Vinay Moore M.D.;  Location: SURGERY [facility];  Service: Orthopedics    RHINOPLASTY  03/2019    KNEE ARTHROSCOPY Left 05/2018     Social History     Tobacco Use    Smoking status: Never    Smokeless tobacco: Never   Vaping Use    Vaping Use: Never used   Substance Use Topics    Alcohol use: Not Currently     Comment: occasional    Drug use: Not Currently     Social History     Social History Narrative    Born in [place]    Raised in [place]    Has a female partner     Current Outpatient Medications Ordered in Epic   Medication Sig Dispense Refill    dicyclomine (BENTYL) 10 MG Cap Take 1 Capsule by mouth 4 Times a Day,Before Meals and at Bedtime. 120 Capsule 0     No current Epic-ordered facility-administered medications on file.     Compazine [prochlorperazine], Gluten meal, Iodine contrast [diagnostic x-ray materials], Gluten meal, and Iodine    PMH/PSH/FH/Social history reviewed.  Vaccinations discussed.  Previous records and labs reviewed. Discussed age appropriate anticipatory guidance.    ROS: see hpi  Gen: no fevers/chills  Pulm: no sob, no cough  CV: no chest pain, no palpitations, no edema  GI: no nausea/vomiting, no diarrhea  Skin: no rash    Objective:   Exam:  /66 (BP Location: Left arm, Patient Position: Sitting, BP Cuff Size: Adult)   Pulse 86   Temp 36.7 °C (98 °F) (Temporal)   Resp 16   Ht 1.702 m (5' 7\")   Wt 72.6 kg (160 lb)   LMP 11/21/2023 (Exact Date)   SpO2 100%   BMI 25.06 kg/m²    Body mass index is 25.06 kg/m².    Gen: Alert and oriented, No apparent distress.  HEENT: Head atraumatic, normocephalic. Pupils equal and round.  Neck: Neck is supple.  Small less than 1 cm left posterior cervical lymph node, freely movable and nontender.  No other palpable lymphadenopathy noted.  Mild thyromegaly, no " nodules.  Lungs: Normal effort, CTA bilaterally, no wheezes, rhonchi, or rales  CV: Regular rate and rhythm. No murmurs, rubs, or gallops.  ABD: +BS. Non-tender, non-distended. No rebound, rigidity, or guarding.  No hepatosplenomegaly.  Ext: No clubbing, cyanosis, edema.    Assessment & Plan:     28 y.o. female with the following -     1. EBV seropositivity  It is essential that you focus on a healthy lifestyle with at least 7 to 8 hours of restful sleep per night, a diet low in processed foods and sugar, and routine low impact exercise such as yoga, walking, resistance training.  Please start vitamin D 2000 IU daily with food, vitamin B complex daily, Vitamin E 100 units daily with food, Zinc 50 mg daily, L-Lysine 500 mg twice a day, Vitamin C 1000 mg daily and omeprazole 20 mg daily.  Repeat labs in 8 weeks.  - EBV ACUTE INFECTION AB PANEL; Future  - CBC WITH DIFFERENTIAL; Future    2. Abnormal thyroid blood test  Repeat labs in 8 weeks.  - ANTI-THYROID ANTIBODIES; Future  - T3 FREE; Future  - FREE THYROXINE; Future  - TSH; Future  - CORTISOL; Future  - ACTH; Future  - US-THYROID; Future  - CBC WITH DIFFERENTIAL; Future    Return in about 8 weeks (around 1/25/2024) for Follow-up labs/tests.    My total time spent caring for the patient on the day of the encounter was 20 minutes.   This does not include time spent on separately billable procedures/tests.    Estefany Ramírez PA-C (Baker)  Physician Assistant Certified  [facility]    Please note that this dictation was created using voice recognition software. I have made every reasonable attempt to correct obvious errors, but I expect that there are errors of grammar and possibly content that I did not discover before finalizing the note.

## 2023-11-30 NOTE — ASSESSMENT & PLAN NOTE
Patient tested positive for reactivated EBV versus acute resolving infection.  She has been taking vitamin regimen of vitamin D, B complex, vitamin D, L-lysine.  She has been working on getting adequate sleep, as well as routine exercise.  She states her bowel concerns have improved.  Her fatigue and brain fog are also getting better.  In addition to the reactivated EBV, she was also found to have borderline elevated free T3 level with normal TSH.

## 2024-02-01 ENCOUNTER — APPOINTMENT (OUTPATIENT)
Dept: MEDICAL GROUP | Facility: IMAGING CENTER | Age: 29
End: 2024-02-01
Payer: COMMERCIAL

## 2024-02-27 ENCOUNTER — APPOINTMENT (OUTPATIENT)
Dept: MEDICAL GROUP | Facility: IMAGING CENTER | Age: 29
End: 2024-02-27
Payer: COMMERCIAL

## 2024-02-29 ENCOUNTER — APPOINTMENT (OUTPATIENT)
Dept: RADIOLOGY | Facility: IMAGING CENTER | Age: 29
End: 2024-02-29
Attending: NURSE PRACTITIONER
Payer: OTHER MISCELLANEOUS

## 2024-02-29 ENCOUNTER — APPOINTMENT (OUTPATIENT)
Dept: MEDICAL GROUP | Facility: IMAGING CENTER | Age: 29
End: 2024-02-29
Payer: COMMERCIAL

## 2024-02-29 ENCOUNTER — NON-PROVIDER VISIT (OUTPATIENT)
Dept: URGENT CARE | Facility: PHYSICIAN GROUP | Age: 29
End: 2024-02-29
Payer: OTHER MISCELLANEOUS

## 2024-02-29 ENCOUNTER — OCCUPATIONAL MEDICINE (OUTPATIENT)
Dept: URGENT CARE | Facility: PHYSICIAN GROUP | Age: 29
End: 2024-02-29
Payer: OTHER MISCELLANEOUS

## 2024-02-29 VITALS
RESPIRATION RATE: 14 BRPM | HEART RATE: 85 BPM | DIASTOLIC BLOOD PRESSURE: 74 MMHG | OXYGEN SATURATION: 98 % | HEIGHT: 67 IN | BODY MASS INDEX: 25.43 KG/M2 | TEMPERATURE: 97.9 F | WEIGHT: 162 LBS | SYSTOLIC BLOOD PRESSURE: 118 MMHG

## 2024-02-29 DIAGNOSIS — S67.21XA CRUSHING INJURY OF RIGHT HAND, INITIAL ENCOUNTER: ICD-10-CM

## 2024-02-29 DIAGNOSIS — Z02.83 ENCOUNTER FOR DRUG SCREENING: ICD-10-CM

## 2024-02-29 LAB
AMP AMPHETAMINE: NORMAL
COC COCAINE: NORMAL
INT CON NEG: NORMAL
INT CON POS: NORMAL
MET METHAMPHETAMINES: NORMAL
OPI OPIATES: NORMAL
PCP PHENCYCLIDINE: NORMAL
POC DRUG COMMENT 753798-OCCUPATIONAL HEALTH: NORMAL
THC: NORMAL

## 2024-02-29 PROCEDURE — 80305 DRUG TEST PRSMV DIR OPT OBS: CPT | Performed by: NURSE PRACTITIONER

## 2024-02-29 PROCEDURE — 73130 X-RAY EXAM OF HAND: CPT | Mod: TC,FY,RT | Performed by: RADIOLOGY

## 2024-02-29 PROCEDURE — 3074F SYST BP LT 130 MM HG: CPT | Performed by: NURSE PRACTITIONER

## 2024-02-29 PROCEDURE — 99213 OFFICE O/P EST LOW 20 MIN: CPT | Performed by: NURSE PRACTITIONER

## 2024-02-29 PROCEDURE — 3078F DIAST BP <80 MM HG: CPT | Performed by: NURSE PRACTITIONER

## 2024-02-29 RX ORDER — POLYETHYLENE GLYCOL 3350, SODIUM SULFATE ANHYDROUS, SODIUM BICARBONATE, SODIUM CHLORIDE, POTASSIUM CHLORIDE 236; 22.74; 6.74; 5.86; 2.97 G/4L; G/4L; G/4L; G/4L; G/4L
POWDER, FOR SOLUTION ORAL
COMMUNITY
Start: 2024-01-22 | End: 2024-03-12

## 2024-02-29 RX ORDER — IBUPROFEN 200 MG
600 TABLET ORAL ONCE
Status: COMPLETED | OUTPATIENT
Start: 2024-02-29 | End: 2024-02-29

## 2024-02-29 RX ADMIN — Medication 600 MG: at 12:00

## 2024-02-29 ASSESSMENT — FIBROSIS 4 INDEX: FIB4 SCORE: 0.42

## 2024-02-29 NOTE — PROGRESS NOTES
"Subjective:     Michel Angelo is a 28 y.o. female who presents for Hand Injury ((R) hand, crushed at work x 1am this morning Sore on the top and had to extend fingers )      Hand Injury    DOI 2/29/2024.  Pt presents for evaluation of a new work comp injury.  Shahnaz is a very pleasant 28-year-old who presents to urgent care today with complaints of right dorsal hand pain after suffering a work comp injury this morning.  She states that she was dropping a patient off at the hospital when the gurney started to roll out of the ambulance. As she pulled the gurney back into the truck, her hand became crushed inbetween the bottom of the gurney and the truck.  She immediately developed pain, swelling and bruising.  She has been using ice, elevation and ibuprofen for her pain.  Her pain is now radiating into her second and third digits.  There is mild paresthesia present.  No previous right-sided hand injury.  She denies any other employers.  She is right-hand dominant.    Review of Systems   Musculoskeletal:  Positive for joint pain.       PMH:   Past Medical History:   Diagnosis Date    Chronic constipation 4/8/2016    Dysmenorrhea 4/8/2016    Hypotension 2/21/2020    -I suspect secondary to hypovolemia -We will run some serum studies -I have recommended that she increase from 2 of the 40 ounce flask's to 3 or 4 especially given that she works out for 1 hour a day 7 days a week -I also recommend considering electrolyte tabs either daily or every other day pending how she feels -I do not suspect inner ear issues from her recent viral illness -rto if symptoms do     NEGATIVE HISTORY OF     Neuritis- right scalp cutaneous x 5 days 9/22/2016     ALLERGIES:   Allergies   Allergen Reactions    Compazine [Prochlorperazine] Anxiety     \"I felt like I was crawling out of my skin\"    Gluten Meal Diarrhea and Vomiting     Diarrhea      Iodine Contrast [Diagnostic X-Ray Materials] Shortness of Breath     Tight chest    Gluten " "Meal     Iodine      SURGHX:   Past Surgical History:   Procedure Laterality Date    KNEE ARTHROSCOPY Right 8/22/2019    Procedure: ARTHROSCOPY, KNEE;  Surgeon: Vinay Moore M.D.;  Location: SURGERY [facility];  Service: Orthopedics    MENISCECTOMY, KNEE, MEDIAL Right 8/22/2019    Procedure: MENISCECTOMY, KNEE, MEDIAL - PARTIAL VERSUS;  Surgeon: Vinay Moore M.D.;  Location: SURGERY [facility];  Service: Orthopedics    MENISCAL REPAIR Right 8/22/2019    Procedure: REPAIR, MENISCUS, KNEE - PROCEED AS INDICATED;  Surgeon: Vinay Moore M.D.;  Location: SURGERY [facility];  Service: Orthopedics    RHINOPLASTY  03/2019    KNEE ARTHROSCOPY Left 05/2018     SOCHX:   Social History     Socioeconomic History    Marital status: Single   Occupational History    Occupation: [place]- 10th grade     Employer: [employer]   Tobacco Use    Smoking status: Never    Smokeless tobacco: Never   Vaping Use    Vaping Use: Never used   Substance and Sexual Activity    Alcohol use: Not Currently     Comment: occasional    Drug use: Not Currently    Sexual activity: Yes     Partners: Female   Social History Narrative    Born in [place]    Raised in [place]    Has a female partner     FH:   Family History   Problem Relation Age of Onset    Hyperlipidemia Mother     Hyperlipidemia Father     Cancer Brother 14        adenocarcinoma- lung, carcinoid- appendix    Diabetes Maternal Grandmother     Breast Cancer Maternal Grandmother         postmenopausal    Genitourinary () Problems Other         cousin         Objective:   /74   Pulse 85   Temp 36.6 °C (97.9 °F) (Temporal)   Resp 14   Ht 1.702 m (5' 7\")   Wt 73.5 kg (162 lb)   SpO2 98%   BMI 25.37 kg/m²     Physical Exam  Vitals and nursing note reviewed.   Constitutional:       General: She is not in acute distress.     Appearance: Normal appearance. She is normal weight. She is not ill-appearing or toxic-appearing.   HENT:      Head: " Normocephalic.      Right Ear: External ear normal.      Left Ear: External ear normal.      Nose: No congestion or rhinorrhea.      Mouth/Throat:      Pharynx: No oropharyngeal exudate or posterior oropharyngeal erythema.   Eyes:      General:         Right eye: No discharge.         Left eye: No discharge.      Pupils: Pupils are equal, round, and reactive to light.   Pulmonary:      Effort: Pulmonary effort is normal.   Abdominal:      General: Abdomen is flat.   Musculoskeletal:         General: Normal range of motion.      Cervical back: Normal range of motion and neck supple.      Comments: Positive for right dorsal hand pain, swelling and ecchymosis at second and third metacarpal.  Pain is worse with flexion and extension.  CMS is intact.   Skin:     General: Skin is dry.   Neurological:      General: No focal deficit present.      Mental Status: She is alert and oriented to person, place, and time. Mental status is at baseline.   Psychiatric:         Mood and Affect: Mood normal.         Behavior: Behavior normal.         Thought Content: Thought content normal.         Judgment: Judgment normal.       Assessment/Plan:   Assessment      1. Crushing injury of right hand, initial encounter  DX-HAND 3+ RIGHT    ibuprofen (Motrin) tablet 600 mg      X-ray images are pending due to system error with PACS system.  I will notify her of results.  She was placed in right hand brace and RICE therapy was discussed.  Continue with over-the-counter ibuprofen and Tylenol not to exceed dosage recommendation on bottle for relief of pain.  She was given work restrictions today.  Patient is not to do any lifting with the right hand.  Will have her follow-up in 7 days or sooner if splinting is needed.  She is in agreement with this plan of care.

## 2024-02-29 NOTE — LETTER
[facility] [place] [address]  Phone:  [phone] - Fax:  [phone]   [facility] Progress Report and Disability Certification  Date of Service: 2/29/2024   No Show:  No  Date / Time of Next Visit:  03/07/2024     Claim Information   Patient Name: Michel Angelo  Claim Number:     Employer: [employer] [place]  Date of Injury: 2/29/2024     Insurer / TPA: Misc Workers Comp  ID / SSN:     Occupation: Paramedic  Diagnosis: The encounter diagnosis was Crushing injury of right hand, initial encounter.    Medical Information   Related to Industrial Injury? Yes    Subjective Complaints:  DOI 2/29/2024.  Pt presents for evaluation of a new work comp injury.  Shahnaz is a very pleasant 28-year-old who presents to urgent care today with complaints of right dorsal hand pain after suffering a work comp injury this morning.  She states that she was dropping a patient off at the hospital when the gurney started to roll out of the ambulance. As she pulled the gurney back into the truck, her hand became crushed inbetween the bottom of the gurney and the truck.  She immediately developed pain, swelling and bruising.  She has been using ice, elevation and ibuprofen for her pain.  Her pain is now radiating into her second and third digits.  There is mild paresthesia present.  No previous right-sided hand injury.  She denies any other employers.  She is right-hand dominant.   Objective Findings:   Comments: Positive for right dorsal hand pain, swelling and ecchymosis at second and third metacarpal.  Pain is worse with flexion and extension.  CMS is intact.      Pre-Existing Condition(s):     Assessment:   Initial Visit    Status: Additional Care Required  Permanent Disability:No    Plan: Diagnostics    Diagnostics:      Comments:       Disability Information   Status: Released to Restricted Duty    From:     Through:   Restrictions are:      Physical Restrictions   Sitting:    Standing:    Stooping:    Bending:      Squatting:    Walking:    Climbing:    Pushing:      Pulling:    Other:    Reaching Above Shoulder (L):   Reaching Above Shoulder (R):       Reaching Below Shoulder (L):    Reaching Below Shoulder (R):      Not to exceed Weight Limits   Carrying(hrs):   Weight Limit(lb):   Lifting(hrs):   Weight  Limit(lb):     Comments: No lifting with the right hand.    Repetitive Actions   Hands: i.e. Fine Manipulations from Grasping:     Feet: i.e. Operating Foot Controls:     Driving / Operate Machinery:     Health Care Provider’s Original or Electronic Signature  ANGIE Gonzalez Health Care Provider’s Original or Electronic Signature    Girma Luciano DO MPH     Clinic Name / Location: [address] [address] Clinic Phone Number: Dept: [phone]   Appointment Time: 11:00 Am Visit Start Time: 11:36 AM   Check-In Time:  11:03 Am Visit Discharge Time:  12:39 PM    Original-Treating Physician or Chiropractor    Page 2-Insurer/TPA    Page 3-Employer    Page 4-Employee

## 2024-02-29 NOTE — LETTER
"    EMPLOYEE’S CLAIM FOR COMPENSATION/ REPORT OF INITIAL TREATMENT  FORM C-4  PLEASE TYPE OR PRINT    EMPLOYEE’S CLAIM - PROVIDE ALL INFORMATION REQUESTED   First Name                    ELISEO Pearson Last Name  Gi Birthdate                    1995                Sex  []M  []F Claim Number (Insurer’s Use Only)     Home Address  [address] Age  28 y.o. 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 (if applicable)  loading gurney into ambulance    How did this injury or occupational disease occur? (Be specific and answer in detail. Use additional sheet if necessary)  I was loading the gurney into the ambulance. The gurney did not lock into place, slid ou the back, and crushed my hand under gurney on ambulance floor.   If you believe that you have an occupational disease, when did you first have knowledge of the disability and its relationship to your employment?  N/A Witnesses to the Accident (if applicable)  N/A      Nature of Injury or Occupational " Disease  Crushing  Part(s) of Body Injured or Affected  Hand (R) N/A N/A    I CERTIFY THAT THE ABOVE IS TRUE AND CORRECT TO T HE BEST OF MY KNOWLEDGE AND THAT I HAVE PROVIDED THIS INFORMATION IN ORDER TO OBTAIN THE BENEFITS OF [place], OR [place]).  I HEREBY AUTHORIZE ANY PHYSICIAN, CHIROPRACTOR, SURGEON, PRACTITIONER OR ANY OTHER PERSON, ANY HOSPITAL, INCLUDING [facility] OR [facility], ANY  MEDICAL SERVICE ORGANIZATION, ANY INSURANCE COMPANY, OR OTHER INSTITUTION OR ORGANIZATION TO RELEASE TO EACH OTHER, ANY MEDICAL OR OTHER INFORMATION, INCLUDING BENEFITS PAID OR PAYABLE, PERTINENT TO THIS INJURY OR DISEASE, EXCEPT INFORMATION RELATIVE TO DIAGNOSIS, TREATMENT AND/OR COUNSELING FOR AIDS, PSYCHOLOGICAL CONDITIONS, ALCOHOL OR CONTROLLED SUBSTANCES, FOR WHICH I MUST GIVE SPECIFIC AUTHORIZATION.  A PHOTOSTAT OF THIS AUTHORIZATION SHALL BE VALID AS THE ORIGINAL.     Date: 02/29/2024   Place: [facility]  Employee’s Original or  *Electronic Signature   THIS REPORT MUST BE COMPLETED AND MAILED WITHIN 3 WORKING DAYS OF TREATMENT   Place  [facility]    Name of Facility  [facility]   Date 2/29/2024 Diagnosis and Description of Injury or Occupational Disease  (S67.21XA) Crushing injury of right hand, initial encounter  The encounter diagnosis was Crushing injury of right hand, initial encounter. Is there evidence that the injured employee was under the influence of alcohol and/or another controlled substance at the time of accident?  []No  [] Yes (if yes, please explain)   Hour 11:36 AM  No   Treatment: DOI 2/29/2024.  Pt presents for evaluation of a new work comp injury.  Shahnaz is a very pleasant 28-year-old who presents to urgent care today with complaints of right dorsal hand pain after suffering a work comp injury this morning.  She states that she was dropping a patient off at the  hospital when the gurney started to roll out of the ambulance. As she pulled the gurney back into the truck, her hand became crushed inbetween the bottom of the gurney and the truck.  She immediately developed pain, swelling and bruising.  She has been using ice, elevation and ibuprofen for her pain.  Her pain is now radiating into her second and third digits.  There is mild paresthesia present.  No previous right-sided hand injury.  She denies any other employers.  She is right-hand dominant.    Have you advised the patient to remain off work five days or more?   [] Yes Indicate dates: From   To    []No If no, is the injured employee capable of: [] full duty [] modified duty                                                             No  Yes  If modified duty, specify any limitations / restrictions:  No lifting with right hand                                                                                                                                                                                                                                                                                                                                                                                                               X-Ray Findings:      From information given by the employee, together with medical evidence, can you directly connect this injury or occupational disease as job incurred?  []Yes   [] No Yes    Is additional medical care by a physician indicated? []Yes [] No  Yes    Do you know of any previous injury or disease contributing to this condition or occupational disease? []Yes [] No (Explain if yes)                          No   Date  2/29/2024 Print Health Care Provider’s Name  ANGIE Gonzalez I certify that the employer’s copy of  this form was delivered to the employer on:   Address  [address] INSURER'S USE ONLY                       [facility] Zip  [address] Provider’s  "Tax ID Number  [id number]   Telephone  Dept: [phone]    Health Care Provider’s Original or Electronic Signature  e-OWEN Martins Degree (MD,DO, DC,LOUIE,DANIEL)  APRN  Choose (if applicable)      ORIGINAL - TREATING HEALTHCARE PROVIDER PAGE 2 - INSURER/TPA PAGE 3 - EMPLOYER PAGE 4 - EMPLOYEE             Form C-4 (rev.08/23)        BRIEF DESCRIPTION OF RIGHTS AND BENEFITS  (Pursuant to [place] 616[place])    Notice of Injury or Occupational Disease (Incident Report Form C-1): If an injury or occupational disease (OD) arises out of and in the course of employment, you must provide written notice to your employer as soon as practicable, but no later than 7 days after the accident or OD. Your employer shall maintain a sufficient supply of the required forms.    Claim for Compensation (Form C-4): If medical treatment is sought, the form C-4 is available at the place of initial treatment. A completed \"Claim for Compensation\" (Form C-4) must be filed within 90 days after an accident or OD. The treating physician or chiropractor must, within 3 working days after treatment, complete and mail to the employer, the employer's insurer and third-party , the Claim for Compensation.    Medical Treatment: If you require medical treatment for your on-the-job injury or OD, you may be required to select a physician or chiropractor from a list provided by your workers’ compensation insurer, if it has contracted with an Organization for Managed Care (MCO) or Preferred Provider Organization (PPO) or providers of health care. If your employer has not entered into a contract with an MCO or PPO, you may select a physician or chiropractor from the Panel of Physicians and Chiropractors. Any medical costs related to your industrial injury or OD will be paid by your insurer.    Temporary Total Disability (TTD): If your doctor has certified that you are unable to work for a period of at least 5 consecutive days, or 5 " cumulative days in a 20-day period, or places restrictions on you that your employer does not accommodate, you may be entitled to TTD compensation.    Temporary Partial Disability (TPD): If the wage you receive upon reemployment is less than the compensation for TTD to which you are entitled, the insurer may be required to pay you TPD compensation to make up the difference. TPD can only be paid for a maximum of 24 months.    Permanent Partial Disability (PPD): When your medical condition is stable and there is an indication of a PPD as a result of your injury or OD, within 30 days, your insurer must arrange for an evaluation by a rating physician or chiropractor to determine the degree of your PPD. The amount of your PPD award depends on the date of injury, the results of the PPD evaluation, your age and wage.    Permanent Total Disability (PTD): If you are medically certified by a treating physician or chiropractor as permanently and totally disabled and have been granted a PTD status by your insurer, you are entitled to receive monthly benefits not to exceed 66 2/3% of your average monthly wage. The amount of your PTD payments is subject to reduction if you previously received a lump-sum PPD award.    Vocational Rehabilitation Services: You may be eligible for vocational rehabilitation services if you are unable to return to the job due to a permanent physical impairment or permanent restrictions as a result of your injury or occupational disease.    Transportation and Per Martita Reimbursement: You may be eligible for travel expenses and per martita associated with medical treatment.    Reopening: You may be able to reopen your claim if your condition worsens after claim closure.     Appeal Process: If you disagree with a written determination issued by the insurer or the insurer does not respond to your request, you may appeal to the Department of Administration, , by following the instructions  contained in your determination letter. You must appeal the determination within 70 days from the date of the determination letter at [address], [address], [place], [place] [address], or [address], [address], [place], [place] [address]. If you disagree with the  decision, you may appeal to the Department of Administration, . You must file your appeal within 30 days from the date of the  decision letter at [address], [address], [place], [place] [address], or [address], [address], [place], [place] [address]. If you disagree with a decision of an , you may file a petition for judicial review with the District Court. You must do so within 30 days of the Appeal Officer’s decision. You may be represented by an  at your own expense or you may contact the [facility] for possible representation.    [place]  for Injured Workers (NAIW): If you disagree with a  decision, you may request that NAIW represent you without charge at an  Hearing. For information regarding denial of benefits, you may contact the [facility] at: [address], [address], [place] [address], [phone], or [address], [address], [place] [address], [phone]    To File a Complaint with the Division: If you wish to file a complaint with the  of the Division of Industrial Relations (DIR),  please contact the Workers’ Compensation Section, [address], [address], [place], [place] [address], telephone (713) 781-4492, or [address], [address], [place], [place] [address], telephone (275) [phone].    For assistance with Workers’ Compensation Issues: You may contact the [facility] Office for Consumer Health Assistance, [address], [address], [place], [place] [address], Toll Free 1-697.585.4947, Web site: http://[facility].[place].gov/Programs/HEMAL E-mail:  lillie.[place].gov              __________________________________________________________________                                    ____02/29/2024________            Employee Name / Signature                                                                                                                            Date                                                                                                                                                                                                                              D-2 (rev. 10/20)

## 2024-03-07 ENCOUNTER — OCCUPATIONAL MEDICINE (OUTPATIENT)
Dept: OCCUPATIONAL MEDICINE | Facility: CLINIC | Age: 29
End: 2024-03-07
Payer: OTHER MISCELLANEOUS

## 2024-03-07 ENCOUNTER — APPOINTMENT (OUTPATIENT)
Dept: RADIOLOGY | Facility: IMAGING CENTER | Age: 29
End: 2024-03-07
Attending: NURSE PRACTITIONER
Payer: OTHER MISCELLANEOUS

## 2024-03-07 VITALS
BODY MASS INDEX: 25.74 KG/M2 | WEIGHT: 164 LBS | SYSTOLIC BLOOD PRESSURE: 130 MMHG | OXYGEN SATURATION: 96 % | HEART RATE: 85 BPM | RESPIRATION RATE: 14 BRPM | HEIGHT: 67 IN | TEMPERATURE: 97.6 F | DIASTOLIC BLOOD PRESSURE: 88 MMHG

## 2024-03-07 DIAGNOSIS — S67.21XD CRUSHING INJURY OF RIGHT HAND, SUBSEQUENT ENCOUNTER: ICD-10-CM

## 2024-03-07 PROCEDURE — 3079F DIAST BP 80-89 MM HG: CPT | Performed by: NURSE PRACTITIONER

## 2024-03-07 PROCEDURE — 3075F SYST BP GE 130 - 139MM HG: CPT | Performed by: NURSE PRACTITIONER

## 2024-03-07 PROCEDURE — 99213 OFFICE O/P EST LOW 20 MIN: CPT | Performed by: NURSE PRACTITIONER

## 2024-03-07 PROCEDURE — 73130 X-RAY EXAM OF HAND: CPT | Mod: TC,RT | Performed by: NURSE PRACTITIONER

## 2024-03-07 ASSESSMENT — FIBROSIS 4 INDEX: FIB4 SCORE: 0.42

## 2024-03-07 NOTE — LETTER
[address],   [address] [place] [address]  Phone:  [phone] - Fax:  [phone]   Occupational Health [facility] Progress Report and Disability Certification  Date of Service: 3/7/2024   No Show:  No  Date / Time of Next Visit: 3/28/2024@9:30 AM   Claim Information   Patient Name: Michel Angelo  Claim Number:     Employer: [employer]  Date of Injury: 2/29/2024     Insurer / TPA: Misc Workers Comp  ID / SSN:     Occupation: Paramedic  Diagnosis: The encounter diagnosis was Crushing injury of right hand, subsequent encounter.    Medical Information   Related to Industrial Injury? Yes    Subjective Complaints:  DOI 2/29/2024.  Pt presents for evaluation of a new work comp injury.  Shahnaz is a very pleasant 28-year-old who presents to urgent care today with complaints of right dorsal hand pain after suffering a work comp injury this morning.  She states that she was dropping a patient off at the hospital when the gurney started to roll out of the ambulance. As she pulled the gurney back into the truck, her hand became crushed inbetween the bottom of the gurney and the truck.    Today she states that symptoms have improved. She has some discomfort and swelling, but states that this has improved. She states that wearing the brace aggravates the area. She denies numbness, weakness, and tingling. She states that she did take OTC Tylenol and Ibuprofen the first few days, but then has not needed it since. She has been able to tolerate light duty. Repeat X-rays reviewed with patient. Plan of care discussed with patient.   Objective Findings: Right hand: Mild dorsal hand pain, and a pebble sized bump with associated TTP at second and third metacarpal.  Pain is worse with flexion and extension.  CMS is intact. No snuffbox tenderness.     Xray Right hand 3/7/24:   IMPRESSION:     No acute osseous abnormality.     Pre-Existing Condition(s):      Assessment:   Condition Improved    Status: Additional Care Required  Permanent Disability:No    Plan: Diagnostics    Diagnostics: X-ray    Comments:  Follow-up in 3 weeks  Restricted duty  Recommend continue with OTC Tylenol/Ibuprofen, ice/heat application, and OTC topical ointment of your choosing  Wean splint as tolerated  Return to clinic sooner with new or worsening symptoms for evaluation and management    Disability Information   Status: Released to Restricted Duty    From:  3/7/2024  Through: 3/28/2024 Restrictions are: Temporary   Physical Restrictions   Sitting:    Standing:    Stooping:    Bending:      Squatting:    Walking:    Climbing:    Pushing:  < or = to 1 hr/day   Pulling:  < or = to 1 hr/day Other:    Reaching Above Shoulder (L):   Reaching Above Shoulder (R):       Reaching Below Shoulder (L):    Reaching Below Shoulder (R):      Not to exceed Weight Limits   Carrying(hrs):   Weight Limit(lb): < or = to 10 pounds Lifting(hrs): 0 Weight  Limit(lb): < or = to 10 pounds   Comments: Avoid pushing, pulling, or lifting more tolbert 10lbs with RUE until cleared     Repetitive Actions   Hands: i.e. Fine Manipulations from Grasping:     Feet: i.e. Operating Foot Controls:     Driving / Operate Machinery:     Health Care Provider’s Original or Electronic Signature  ANGIE Lanier Health Care Provider’s Original or Electronic Signature    Girma Luciano DO MPH     Clinic Name / Location: [address],   [address]  [place] [address] Clinic Phone Number: Dept: [phone]   Appointment Time: 1:30 Pm Visit Start Time: 1:04 PM   Check-In Time:  1:04 Pm Visit Discharge Time:  1:50 PM   Original-Treating Physician or Chiropractor    Page 2-Insurer/TPA    Page 3-Employer    Page 4-Employee

## 2024-03-07 NOTE — PROGRESS NOTES
"Subjective:     Michel Angelo is a 28 y.o. female who presents for Follow-Up (DOI 2/29/2024 - left hand injury/)      DOI 2/29/2024.  Pt presents for evaluation of a new work comp injury.  Shahnaz is a very pleasant 28-year-old who presents to urgent care today with complaints of right dorsal hand pain after suffering a work comp injury this morning.  She states that she was dropping a patient off at the hospital when the gurney started to roll out of the ambulance. As she pulled the gurney back into the truck, her hand became crushed inbetween the bottom of the gurney and the truck.    Today she states that symptoms have improved. She has some discomfort and swelling, but states that this has improved. She states that wearing the brace aggravates the area. She denies numbness, weakness, and tingling. She states that she did take OTC Tylenol and Ibuprofen the first few days, but then has not needed it since. She has been able to tolerate light duty. Repeat X-rays reviewed with patient. Plan of care discussed with patient.    ROS: All systems were reviewed on intake form, form was reviewed and signed. See scanned documents in media. Pertinent positives and negatives included in HPI.    PMH: No pertinent past medical history to this problem  MEDS: Medications were reviewed in Epic  ALLERGIES:   Allergies   Allergen Reactions    Compazine [Prochlorperazine] Anxiety     \"I felt like I was crawling out of my skin\"    Gluten Meal Diarrhea and Vomiting     Diarrhea      Iodine Contrast [Diagnostic X-Ray Materials] Shortness of Breath     Tight chest    Gluten Meal     Iodine      SOCHX: Works as EMT at [employer]  FH: No pertinent family history to this problem       Objective:     /88   Pulse 85   Temp 36.4 °C (97.6 °F) (Temporal)   Resp 14   Ht 1.702 m (5' 7\")   Wt 74.4 kg (164 lb)   SpO2 96%   BMI 25.69 kg/m²     [unfilled]    Right hand: Mild dorsal hand pain, and a pebble sized bump " with associated TTP at second and third metacarpal.  Pain is worse with flexion and extension.  CMS is intact. No snuffbox tenderness.     Xray Right hand 3/7/24:   IMPRESSION:     No acute osseous abnormality.      Assessment/Plan:       1. Crushing injury of right hand, subsequent encounter  - DX-HAND 3+ RIGHT; Future    Released to Restricted Duty FROM 3/7/2024 TO 3/28/2024  Avoid pushing, pulling, or lifting more tolbert 10lbs with RUE until cleared   Follow-up in 3 weeks  Restricted duty  Recommend continue with OTC Tylenol/Ibuprofen, ice/heat application, and OTC topical ointment of your choosing  Wean splint as tolerated  Return to clinic sooner with new or worsening symptoms for evaluation and management    Differential diagnosis, natural history, supportive care, and indications for immediate follow-up discussed.    Approximately 25 minutes were spent in reviewing notes, preparing for visit, obtaining history, exam and evaluation, patient counseling/education and post visit documentation/orders.

## 2024-03-12 ENCOUNTER — OFFICE VISIT (OUTPATIENT)
Dept: MEDICAL GROUP | Facility: IMAGING CENTER | Age: 29
End: 2024-03-12
Payer: COMMERCIAL

## 2024-03-12 ENCOUNTER — HOSPITAL ENCOUNTER (OUTPATIENT)
Dept: LAB | Facility: MEDICAL CENTER | Age: 29
End: 2024-03-12
Attending: PHYSICIAN ASSISTANT
Payer: COMMERCIAL

## 2024-03-12 VITALS
BODY MASS INDEX: 25.74 KG/M2 | SYSTOLIC BLOOD PRESSURE: 110 MMHG | DIASTOLIC BLOOD PRESSURE: 70 MMHG | OXYGEN SATURATION: 99 % | TEMPERATURE: 97.1 F | RESPIRATION RATE: 16 BRPM | HEIGHT: 67 IN | WEIGHT: 164 LBS | HEART RATE: 84 BPM

## 2024-03-12 DIAGNOSIS — F41.9 ANXIETY: ICD-10-CM

## 2024-03-12 DIAGNOSIS — Z01.84 IMMUNITY STATUS TESTING: ICD-10-CM

## 2024-03-12 DIAGNOSIS — Z23 NEED FOR VACCINATION: ICD-10-CM

## 2024-03-12 DIAGNOSIS — R76.8 EBV SEROPOSITIVITY: ICD-10-CM

## 2024-03-12 DIAGNOSIS — R79.89 ABNORMAL THYROID BLOOD TEST: ICD-10-CM

## 2024-03-12 PROBLEM — N64.59 BREAST COMPLAINT: Status: RESOLVED | Noted: 2022-12-22 | Resolved: 2024-03-12

## 2024-03-12 LAB
BASOPHILS # BLD AUTO: 0.7 % (ref 0–1.8)
BASOPHILS # BLD: 0.04 K/UL (ref 0–0.12)
CORTIS SERPL-MCNC: 14.9 UG/DL (ref 0–23)
EOSINOPHIL # BLD AUTO: 0.09 K/UL (ref 0–0.51)
EOSINOPHIL NFR BLD: 1.6 % (ref 0–6.9)
ERYTHROCYTE [DISTWIDTH] IN BLOOD BY AUTOMATED COUNT: 39.9 FL (ref 35.9–50)
HBV SURFACE AB SERPL IA-ACNC: <3.5 MIU/ML (ref 0–10)
HBV SURFACE AG SER QL: NORMAL
HCT VFR BLD AUTO: 44.4 % (ref 37–47)
HGB BLD-MCNC: 14.8 G/DL (ref 12–16)
IMM GRANULOCYTES # BLD AUTO: 0.02 K/UL (ref 0–0.11)
IMM GRANULOCYTES NFR BLD AUTO: 0.4 % (ref 0–0.9)
LYMPHOCYTES # BLD AUTO: 2.03 K/UL (ref 1–4.8)
LYMPHOCYTES NFR BLD: 36.8 % (ref 22–41)
MCH RBC QN AUTO: 28.7 PG (ref 27–33)
MCHC RBC AUTO-ENTMCNC: 33.3 G/DL (ref 32.2–35.5)
MCV RBC AUTO: 86 FL (ref 81.4–97.8)
MONOCYTES # BLD AUTO: 0.37 K/UL (ref 0–0.85)
MONOCYTES NFR BLD AUTO: 6.7 % (ref 0–13.4)
NEUTROPHILS # BLD AUTO: 2.97 K/UL (ref 1.82–7.42)
NEUTROPHILS NFR BLD: 53.8 % (ref 44–72)
NRBC # BLD AUTO: 0 K/UL
NRBC BLD-RTO: 0 /100 WBC (ref 0–0.2)
PLATELET # BLD AUTO: 272 K/UL (ref 164–446)
PMV BLD AUTO: 12.7 FL (ref 9–12.9)
RBC # BLD AUTO: 5.16 M/UL (ref 4.2–5.4)
T3FREE SERPL-MCNC: 3.27 PG/ML (ref 2–4.4)
T4 FREE SERPL-MCNC: 1.1 NG/DL (ref 0.93–1.7)
TSH SERPL DL<=0.005 MIU/L-ACNC: 3 UIU/ML (ref 0.38–5.33)
WBC # BLD AUTO: 5.5 K/UL (ref 4.8–10.8)

## 2024-03-12 PROCEDURE — 36415 COLL VENOUS BLD VENIPUNCTURE: CPT

## 2024-03-12 PROCEDURE — 82533 TOTAL CORTISOL: CPT

## 2024-03-12 PROCEDURE — 84439 ASSAY OF FREE THYROXINE: CPT

## 2024-03-12 PROCEDURE — 99213 OFFICE O/P EST LOW 20 MIN: CPT | Mod: 25 | Performed by: PHYSICIAN ASSISTANT

## 2024-03-12 PROCEDURE — 86480 TB TEST CELL IMMUN MEASURE: CPT

## 2024-03-12 PROCEDURE — 90619 MENACWY-TT VACCINE IM: CPT | Performed by: PHYSICIAN ASSISTANT

## 2024-03-12 PROCEDURE — 90471 IMMUNIZATION ADMIN: CPT | Performed by: PHYSICIAN ASSISTANT

## 2024-03-12 PROCEDURE — 86663 EPSTEIN-BARR ANTIBODY: CPT

## 2024-03-12 PROCEDURE — 84443 ASSAY THYROID STIM HORMONE: CPT

## 2024-03-12 PROCEDURE — 86706 HEP B SURFACE ANTIBODY: CPT

## 2024-03-12 PROCEDURE — 1126F AMNT PAIN NOTED NONE PRSNT: CPT | Performed by: PHYSICIAN ASSISTANT

## 2024-03-12 PROCEDURE — 3078F DIAST BP <80 MM HG: CPT | Performed by: PHYSICIAN ASSISTANT

## 2024-03-12 PROCEDURE — 85025 COMPLETE CBC W/AUTO DIFF WBC: CPT

## 2024-03-12 PROCEDURE — 87340 HEPATITIS B SURFACE AG IA: CPT

## 2024-03-12 PROCEDURE — 86664 EPSTEIN-BARR NUCLEAR ANTIGEN: CPT

## 2024-03-12 PROCEDURE — 84481 FREE ASSAY (FT-3): CPT

## 2024-03-12 PROCEDURE — 86665 EPSTEIN-BARR CAPSID VCA: CPT

## 2024-03-12 PROCEDURE — 82024 ASSAY OF ACTH: CPT

## 2024-03-12 PROCEDURE — 3074F SYST BP LT 130 MM HG: CPT | Performed by: PHYSICIAN ASSISTANT

## 2024-03-12 RX ORDER — HYDROXYZINE HYDROCHLORIDE 25 MG/1
25 TABLET, FILM COATED ORAL 3 TIMES DAILY PRN
Qty: 30 TABLET | Refills: 1 | Status: SHIPPED | OUTPATIENT
Start: 2024-03-12

## 2024-03-12 ASSESSMENT — PATIENT HEALTH QUESTIONNAIRE - PHQ9: CLINICAL INTERPRETATION OF PHQ2 SCORE: 0

## 2024-03-12 ASSESSMENT — PAIN SCALES - GENERAL: PAINLEVEL: NO PAIN

## 2024-03-12 ASSESSMENT — FIBROSIS 4 INDEX: FIB4 SCORE: 0.42

## 2024-03-12 NOTE — PROGRESS NOTES
Subjective:     CC:   Chief Complaint   Patient presents with    Requesting Labs       HPI:   Michel presents today to discuss titers and vaccines for school.  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  She will get the flu shot pharmacy.  She needs titers for hep B as well as Tb quantiferon testing.    Anxiety  Chronic, intermittent.  Most notably at night.  Uses hydroxyzine as needed when she feels anxious before bed.  Does not take daily.  Needs refill today.      Past Medical History:   Diagnosis Date    Chronic constipation 4/8/2016    Dysmenorrhea 4/8/2016    Hypotension 2/21/2020    -I suspect secondary to hypovolemia -We will run some serum studies -I have recommended that she increase from 2 of the 40 ounce flask's to 3 or 4 especially given that she works out for 1 hour a day 7 days a week -I also recommend considering electrolyte tabs either daily or every other day pending how she feels -I do not suspect inner ear issues from her recent viral illness -rto if symptoms do     NEGATIVE HISTORY OF     Neuritis- right scalp cutaneous x 5 days 9/22/2016     Family History   Problem Relation Age of Onset    Hyperlipidemia Mother     Hyperlipidemia Father     Cancer Brother 14        adenocarcinoma- lung, carcinoid- appendix    Diabetes Maternal Grandmother     Breast Cancer Maternal Grandmother         postmenopausal    Genitourinary () Problems Other         cousin     Past Surgical History:   Procedure Laterality Date    KNEE ARTHROSCOPY Right 8/22/2019    Procedure: ARTHROSCOPY, KNEE;  Surgeon: Vinay Moore M.D.;  Location: [facility];  Service: Orthopedics    MENISCECTOMY, KNEE, MEDIAL Right 8/22/2019    Procedure: MENISCECTOMY, KNEE, MEDIAL - PARTIAL VERSUS;  Surgeon: Vinay Moore M.D.;  Location: [facility];  Service: Orthopedics    MENISCAL REPAIR Right 8/22/2019    Procedure: REPAIR, MENISCUS, KNEE - PROCEED  "AS INDICATED;  Surgeon: Vinay Moore M.D.;  [facility]: [facility];  Service: Orthopedics    RHINOPLASTY  03/2019    KNEE ARTHROSCOPY Left 05/2018     Social History     Tobacco Use    Smoking status: Never    Smokeless tobacco: Never   Vaping Use    Vaping Use: Never used   Substance Use Topics    Alcohol use: Not Currently     Comment: occasional    Drug use: Not Currently     Social History     Social History Narrative    Born in [place]    Raised in [place]    Has a female partner     Current Outpatient Medications Ordered in Epic   Medication Sig Dispense Refill    hydrOXYzine HCl (ATARAX) 25 MG Tab Take 1 Tablet by mouth 3 times a day as needed for Anxiety. 30 Tablet 1     No current Epic-ordered facility-administered medications on file.     Compazine [prochlorperazine], Gluten meal, Iodine contrast [diagnostic x-ray materials], Gluten meal, and Iodine    PMH/PSH/FH/Social history reviewed.  Vaccinations discussed.  Previous records and labs reviewed. Discussed age appropriate anticipatory guidance.    ROS: see hpi  Gen: no fevers/chills  Pulm: no sob, no cough  CV: no chest pain, no palpitations, no edema  GI: no nausea/vomiting, no diarrhea  Skin: no rash    Objective:   Exam:  /70 (BP Location: Right arm, Patient Position: Sitting, BP Cuff Size: Adult)   Pulse 84   Temp 36.2 °C (97.1 °F) (Temporal)   Resp 16   Ht 1.702 m (5' 7\")   Wt 74.4 kg (164 lb)   LMP 02/15/2024 (Exact Date)   SpO2 99%   BMI 25.69 kg/m²    Body mass index is 25.69 kg/m².    Gen: Alert and oriented, No apparent distress.  HEENT: Head atraumatic, normocephalic. Pupils equal and round.  Neck: Neck is supple without lymphadenopathy.   Lungs: Normal effort, CTA bilaterally, no wheezes, rhonchi, or rales  CV: Regular rate and rhythm. No murmurs, rubs, or gallops.  Ext: No clubbing, cyanosis, edema.    Assessment & Plan:     28 y.o. female with the following -     1. Anxiety  Chronic, controlled and " stable. Continue current regimen -   - hydrOXYzine HCl (ATARAX) 25 MG Tab; Take 1 Tablet by mouth 3 times a day as needed for Anxiety.  Dispense: 30 Tablet; Refill: 1    2. Immunity status testing  Vaccine records show only 1 documented meningitis A vaccine, will give second dose today and reprint vaccine records.  Check labs below.  Will update hep B boosters if negative titers.  - HEP B SURFACE AB; Future  - HEP B SURFACE ANTIGEN; Future  - Quantiferon Gold TB (PPD); Future    3. Need for vaccination  Administer 2nd Ghosh vaccine.  - Meningococcal [id number]&W-135 (MenQuadfi)      Return for Will notify patient to follow-up pending tests.    Estefany Ramírez PA-C ([place])  Physician Assistant Certified  [facility]    Please note that this dictation was created using voice recognition software. I have made every reasonable attempt to correct obvious errors, but I expect that there are errors of grammar and possibly content that I did not discover before finalizing the note.

## 2024-03-12 NOTE — ASSESSMENT & PLAN NOTE
Chronic, intermittent.  Most notably at night.  Uses hydroxyzine as needed when she feels anxious before bed.  Does not take daily.  Needs refill today.  
12-Jan-2023

## 2024-03-12 NOTE — PATIENT INSTRUCTIONS
It was a pleasure meeting with you today at [facility]!    Your medical history/records and medications were reviewed today.     UPDATE on MyChart Results: If you have blood work, and/or imaging studies, or any other test or procedure completed, you will have access to results as soon as they become available in MyChart. Recently, these results will be available for review at the same time that your provider is able to see results!    This will likely mean you will see a result before your provider has had a chance to review and discuss with you.  Some results or care notes may be hard to understand and may be serious in nature.    We look at every result and your provider will contact you to explain what they mean and discuss appropriate next steps. Please allow for at least 72 business hours for chart and result review.     We prefer that you wait for your care team to contact you with your results.  Often, your provider will discuss your results with you at your next appointment. We look forward to continuing to partner with you in your care.    Please review my practice information below:    If you have any prescription refill requests, please send them via [employer] or discuss with your provider at the start of your office visits. Please allow 3-5 business days for lab and testing review and you will be contacted via [employer] with those results, or if advised to make a follow up appointment regarding those results, then please do so.     Once resulted, your lab/test/imaging results will show up automatically in your MyChart. Please wait for my interpretation and recommendations prior to viewing your results to avoid any unnecessary confusion or misinterpretation. I will address all of the lab values that I interpret as abnormal and message you accordingly on your MyChart. I will always send you a message about your results even if they are normal. If you do not hear back from me within 5-7 business  days after completing your tests, then please send me a message on [employer] so I can obtain your results (especially if you went to an outside lab or imaging center - [facility], [facility], etc).     If you have any additional questions or concerns beyond my interpretation of your results, please make an appointment with me to discuss in further detail.    Please only use the [employer] messaging system for questions regarding your most recent appointment or if advised to use otherwise (glucose or blood pressure reporting).     If you have any new problems or concerns, you must make an appointment to discuss. This includes any referral requests, lab requests (unless advised to notify me for pre-appt labs), medication side effects, or request for medication adjustments.     Please arrive 15 minutes prior to your appointment time to complete your check-in and intake with the medical assistant.      Thank you,    Estefany Ramírez PA-C ([place])  Physician Assistant Certified  [facility]    -----------------------------------------------------------------    Attn: Patients of [facility]:    In an effort to continue to provide excellent and efficient care to our patients, it is vital that we continue to use our resources appropriately. With that, this is a reminder that [employer] is used for prescription refill requests, test results, virtual visits, and chart review only.     Any new questions, concerns/conditions, lab/imaging requests, medication adjustments, new prescriptions, or referral requests do require an appointment (virtually or in person), unless discussed otherwise at your most recent appointment.     Thank you for your understanding,    [facility]

## 2024-03-13 LAB
ACTH PLAS-MCNC: 4.5 PG/ML (ref 7.2–63.3)
EBV EA-D IGG SER-ACNC: 93 U/ML (ref 0–10.9)
EBV NA IGG SER IA-ACNC: 122 U/ML (ref 0–21.9)
EBV VCA IGG SER IA-ACNC: 157 U/ML (ref 0–21.9)
EBV VCA IGM SER IA-ACNC: 10.5 U/ML (ref 0–43.9)
GAMMA INTERFERON BACKGROUND BLD IA-ACNC: 0.06 IU/ML
M TB IFN-G BLD-IMP: NEGATIVE
M TB IFN-G CD4+ BCKGRND COR BLD-ACNC: 0 IU/ML
MITOGEN IGNF BCKGRD COR BLD-ACNC: >10 IU/ML
QFT TB2 - NIL TBQ2: 0.01 IU/ML

## 2024-03-29 ENCOUNTER — NON-PROVIDER VISIT (OUTPATIENT)
Dept: MEDICAL GROUP | Facility: IMAGING CENTER | Age: 29
End: 2024-03-29
Payer: COMMERCIAL

## 2024-03-29 DIAGNOSIS — Z23 NEED FOR VACCINATION: ICD-10-CM

## 2024-03-29 NOTE — PROGRESS NOTES
"Arin Angelo is a 28 y.o. female here for a non-provider visit for:   HEPATITIS B 1 of 3    Reason for immunization: lack of immunity demonstrated on prior labs or testing  Immunization records indicate need for vaccine: Yes, confirmed with Epic  Minimum interval has been met for this vaccine: Yes  ABN completed: Not Indicated    VIS Dated   was given to patient: Yes  All IAC Questionnaire questions were answered \"No.\"    Patient tolerated injection and no adverse effects were observed or reported: Yes    Pt scheduled for next dose in series: Yes  "

## 2024-04-01 ENCOUNTER — OCCUPATIONAL MEDICINE (OUTPATIENT)
Dept: OCCUPATIONAL MEDICINE | Facility: CLINIC | Age: 29
End: 2024-04-01
Payer: OTHER MISCELLANEOUS

## 2024-04-01 VITALS
SYSTOLIC BLOOD PRESSURE: 104 MMHG | WEIGHT: 167.2 LBS | OXYGEN SATURATION: 98 % | TEMPERATURE: 97.5 F | HEIGHT: 67 IN | BODY MASS INDEX: 26.24 KG/M2 | HEART RATE: 84 BPM | DIASTOLIC BLOOD PRESSURE: 70 MMHG | RESPIRATION RATE: 16 BRPM

## 2024-04-01 DIAGNOSIS — S67.21XD CRUSHING INJURY OF RIGHT HAND, SUBSEQUENT ENCOUNTER: ICD-10-CM

## 2024-04-01 PROCEDURE — 99213 OFFICE O/P EST LOW 20 MIN: CPT | Performed by: NURSE PRACTITIONER

## 2024-04-01 PROCEDURE — 3078F DIAST BP <80 MM HG: CPT | Performed by: NURSE PRACTITIONER

## 2024-04-01 PROCEDURE — 3074F SYST BP LT 130 MM HG: CPT | Performed by: NURSE PRACTITIONER

## 2024-04-01 ASSESSMENT — ENCOUNTER SYMPTOMS
CONSTITUTIONAL NEGATIVE: 1
TINGLING: 0
CARDIOVASCULAR NEGATIVE: 1
MYALGIAS: 1
SENSORY CHANGE: 0
PSYCHIATRIC NEGATIVE: 1
RESPIRATORY NEGATIVE: 1
WEAKNESS: 0

## 2024-04-01 ASSESSMENT — FIBROSIS 4 INDEX: FIB4 SCORE: .4642857142857142857

## 2024-04-01 NOTE — LETTER
[address],   [address] [place] [address]  Phone:  [phone] - Fax:  [phone]   Occupational Health [facility] Progress Report and Disability Certification  Date of Service: 4/1/2024   No Show:  No  Date / Time of Next Visit:  Discharged/MMI  Released to full duty   Claim Information   Patient Name: Michel Angelo  Claim Number:     Employer: [employer] [employer]  Date of Injury: 2/29/2024     Insurer / TPA: Misc Workers Comp  ID / SSN:     Occupation: Paramedic  Diagnosis: The encounter diagnosis was Crushing injury of right hand, subsequent encounter.    Medical Information   Related to Industrial Injury? Yes    Subjective Complaints:    DOI 2/29/2024.  Pt presents for evaluation of a new work comp injury.  Shahnaz is a very pleasant 28-year-old who presents to urgent care today with complaints of right dorsal hand pain after suffering a work comp injury this morning.  She states that she was dropping a patient off at the hospital when the gurney started to roll out of the ambulance. As she pulled the gurney back into the truck, her hand became crushed inbetween the bottom of the gurney and the truck.     Today she states that symptoms have improved. She has pinpoint discomfort. She has weaned the brace without difficulty. She denies numbness, weakness, and tingling. She states that she did take OTC Tylenol and Ibuprofen the first few days, but then has not needed it since. She has been able to tolerate light duty.  She will be released from care at this time.  Plan of care discussed with patient.     Objective Findings:    Right hand: Mild dorsal hand pain, and a pebble sized bump with associated TTP at second and third metacarpal.  Mild discomfort noted with flexion and extension.  CMS is intact. No snuffbox tenderness.      Xray Right hand 3/7/24:   IMPRESSION:     No acute osseous abnormality.     Pre-Existing Condition(s):     Assessment:    Condition Improved    Status: Discharged /  MMI  Permanent Disability:No    Plan:      Diagnostics:      Comments:  Discharged/MMI  Released to full duty  Follow-up if needed    Disability Information   Status: Released to Full Duty    From:  4/1/2024  Through:   Restrictions are:     Physical Restrictions   Sitting:    Standing:    Stooping:    Bending:      Squatting:    Walking:    Climbing:    Pushing:      Pulling:    Other:    Reaching Above Shoulder (L):   Reaching Above Shoulder (R):       Reaching Below Shoulder (L):    Reaching Below Shoulder (R):      Not to exceed Weight Limits   Carrying(hrs):   Weight Limit(lb):   Lifting(hrs):   Weight  Limit(lb):     Comments:      Repetitive Actions   Hands: i.e. Fine Manipulations from Grasping:     Feet: i.e. Operating Foot Controls:     Driving / Operate Machinery:     Health Care Provider’s Original or Electronic Signature  ANGIE Lanier Health Care Provider’s Original or Electronic Signature    Girma Luciano DO MPH     Clinic Name / Location: [address],   [place] [address]  [place] [address] Clinic Phone Number: Dept: [phone]   Appointment Time: 3:45 Pm Visit Start Time: 3:26 PM   Check-In Time:  3:23 Pm Visit Discharge Time:  3:47 PM   Original-Treating Physician or Chiropractor    Page 2-Insurer/TPA    Page 3-Employer    Page 4-Employee

## 2024-04-01 NOTE — PROGRESS NOTES
"Subjective:     Michel Angelo is a 28 y.o. female who presents for Follow-Up ( Follow Up)        DOI 2/29/2024.  Pt presents for evaluation of a new work comp injury.  Shahnaz is a very pleasant 28-year-old who presents to urgent care today with complaints of right dorsal hand pain after suffering a work comp injury this morning.  She states that she was dropping a patient off at the hospital when the gurney started to roll out of the ambulance. As she pulled the gurney back into the truck, her hand became crushed inbetween the bottom of the gurney and the truck.     Today she states that symptoms have improved. She has pinpoint discomfort. She has weaned the brace without difficulty. She denies numbness, weakness, and tingling. She states that she did take OTC Tylenol and Ibuprofen the first few days, but then has not needed it since. She has been able to tolerate light duty.  She will be released from care at this time.  Plan of care discussed with patient.      Review of Systems   Constitutional: Negative.    Respiratory: Negative.     Cardiovascular: Negative.    Musculoskeletal:  Positive for myalgias. Negative for joint pain.   Skin:         Bony deformity    Neurological:  Negative for tingling, sensory change and weakness.   Psychiatric/Behavioral: Negative.         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 Appears well-developed and well-nourished.   Cardiovascular: Normal rate.    Pulmonary/Chest: Effort normal. No respiratory distress.   Neurological: Patient is alert and oriented to person, place, and time.   Skin: " Skin is warm and dry.   Psychiatric: Normal mood and affect. Behavior is normal.        Right hand: Mild dorsal hand pain, and a pebble sized bump with associated TTP at second and third metacarpal.  Mild discomfort noted with flexion and extension.  CMS is intact. No snuffbox tenderness.      Xray Right hand 3/7/24:   IMPRESSION:     No acute osseous abnormality.      Assessment/Plan:       1. Crushing injury of right hand, subsequent encounter    Released to Full Duty FROM 4/1/2024 TO         Discharged/MMI  Released to full duty  Follow-up if needed    Differential diagnosis, natural history, supportive care, and indications for immediate follow-up discussed.    Approximately 25 minutes was spent in preparing for visit, obtaining history, exam and evaluation, patient counseling/education and post visit documentation/orders.

## 2024-04-08 DIAGNOSIS — Z11.1 SCREENING-PULMONARY TB: ICD-10-CM

## 2024-04-30 ENCOUNTER — NON-PROVIDER VISIT (OUTPATIENT)
Dept: MEDICAL GROUP | Facility: IMAGING CENTER | Age: 29
End: 2024-04-30
Payer: COMMERCIAL

## 2024-04-30 DIAGNOSIS — Z23 NEED FOR VACCINATION: ICD-10-CM

## 2024-04-30 NOTE — PROGRESS NOTES
"Arin Angelo is a 28 y.o. female here for a non-provider visit for:   HEPATITIS B 2 of 2    Reason for immunization: needed for work/school  Immunization records indicate need for vaccine: Yes, confirmed with Epic  Minimum interval has been met for this vaccine: Yes  ABN completed: Not Indicated    VIS Dated  4/30/2024 was given to patient: Yes  All IAC Questionnaire questions were answered \"No.\"    Patient tolerated injection and no adverse effects were observed or reported: Yes    Pt scheduled for next dose in series: No  "

## 2024-05-31 ENCOUNTER — OFFICE VISIT (OUTPATIENT)
Dept: URGENT CARE | Facility: CLINIC | Age: 29
End: 2024-05-31
Payer: COMMERCIAL

## 2024-05-31 VITALS
HEIGHT: 67 IN | DIASTOLIC BLOOD PRESSURE: 60 MMHG | TEMPERATURE: 98.1 F | RESPIRATION RATE: 16 BRPM | WEIGHT: 162 LBS | OXYGEN SATURATION: 99 % | BODY MASS INDEX: 25.43 KG/M2 | SYSTOLIC BLOOD PRESSURE: 104 MMHG | HEART RATE: 82 BPM

## 2024-05-31 DIAGNOSIS — R10.30 LOWER ABDOMINAL PAIN: ICD-10-CM

## 2024-05-31 DIAGNOSIS — R30.0 DYSURIA: ICD-10-CM

## 2024-05-31 LAB
APPEARANCE UR: CLEAR
BILIRUB UR STRIP-MCNC: NORMAL MG/DL
COLOR UR AUTO: NORMAL
GLUCOSE UR STRIP.AUTO-MCNC: NORMAL MG/DL
KETONES UR STRIP.AUTO-MCNC: NORMAL MG/DL
LEUKOCYTE ESTERASE UR QL STRIP.AUTO: NORMAL
NITRITE UR QL STRIP.AUTO: NORMAL
PH UR STRIP.AUTO: 6 [PH] (ref 5–8)
PROT UR QL STRIP: NORMAL MG/DL
RBC UR QL AUTO: NORMAL
SP GR UR STRIP.AUTO: 1
UROBILINOGEN UR STRIP-MCNC: 0.2 MG/DL

## 2024-05-31 RX ORDER — NITROFURANTOIN 25; 75 MG/1; MG/1
100 CAPSULE ORAL 2 TIMES DAILY
Qty: 10 CAPSULE | Refills: 0 | Status: SHIPPED | OUTPATIENT
Start: 2024-05-31 | End: 2024-06-05

## 2024-05-31 ASSESSMENT — ENCOUNTER SYMPTOMS
FLANK PAIN: 0
DIARRHEA: 0
ABDOMINAL PAIN: 1
VOMITING: 0
NAUSEA: 1
CHILLS: 0
FEVER: 0

## 2024-05-31 ASSESSMENT — FIBROSIS 4 INDEX: FIB4 SCORE: .4642857142857142857

## 2024-06-01 NOTE — PROGRESS NOTES
"Subjective     Arin Angelo is a 28 y.o. female who presents with Painful Urination (X this morning, during/after urination, lower abdominal pain x few minutes ago, nauseous, will travel )    HPI:  Michel Angelo is a 28 y.o. female who presents today for evaluation of possible urinary tract infection.  Patient reports that she started to notice some pain with urination this morning.  She says that after she urinated she was still noticing some burning discomfort and also felt like she had to pee again.  She says that that has persisted all day but as the day has gone on she has also started to get some lower abdominal discomfort/cramping as well as some slight nausea.  No fever.  No recent travel.  Bowel movements have been normal.  She is leaving to drive to [place] in the morning and wants to make sure she does not need any medication prior to her getting the road.        Review of Systems   Constitutional:  Negative for chills and fever.   Gastrointestinal:  Positive for abdominal pain and nausea. Negative for diarrhea and vomiting.   Genitourinary:  Positive for dysuria, frequency and urgency. Negative for flank pain and hematuria.           PMH:  has a past medical history of Chronic constipation (4/8/2016), Dysmenorrhea (4/8/2016), Hypotension (2/21/2020), NEGATIVE HISTORY OF, and Neuritis- right scalp cutaneous x 5 days (9/22/2016).  MEDS:   Current Outpatient Medications:     nitrofurantoin (MACROBID) 100 MG Cap, Take 1 Capsule by mouth 2 times a day for 5 days., Disp: 10 Capsule, Rfl: 0    hydrOXYzine HCl (ATARAX) 25 MG Tab, Take 1 Tablet by mouth 3 times a day as needed for Anxiety. (Patient not taking: Reported on 5/31/2024), Disp: 30 Tablet, Rfl: 1  ALLERGIES:   Allergies   Allergen Reactions    Compazine [Prochlorperazine] Anxiety     \"I felt like I was crawling out of my skin\"    Gluten Meal Diarrhea and Vomiting     Diarrhea      Iodine Contrast [Diagnostic X-Ray Materials] Shortness of " "Breath     Tight chest    Gluten Meal     Iodine      SURGHX:   Past Surgical History:   Procedure Laterality Date    KNEE ARTHROSCOPY Right 8/22/2019    Procedure: ARTHROSCOPY, KNEE;  Surgeon: Vinay Moore M.D.;  Location: SURGERY [facility];  Service: Orthopedics    MENISCECTOMY, KNEE, MEDIAL Right 8/22/2019    Procedure: MENISCECTOMY, KNEE, MEDIAL - PARTIAL VERSUS;  Surgeon: Vinay Moore M.D.;  Location: SURGERY [facility];  Service: Orthopedics    MENISCAL REPAIR Right 8/22/2019    Procedure: REPAIR, MENISCUS, KNEE - PROCEED AS INDICATED;  Surgeon: Vinay Moore M.D.;  Location: SURGERY [facility];  Service: Orthopedics    RHINOPLASTY  03/2019    KNEE ARTHROSCOPY Left 05/2018     SOCHX:  reports that she has never smoked. She has never used smokeless tobacco. She reports that she does not currently use alcohol. She reports that she does not currently use drugs.  FH: Family history was reviewed, no pertinent findings to report      Objective     /60 (BP Location: Left arm, Patient Position: Sitting, BP Cuff Size: Adult)   Pulse 82   Temp 36.7 °C (98.1 °F) (Temporal)   Resp 16   Ht 1.702 m (5' 7\")   Wt 73.5 kg (162 lb)   LMP 05/06/2024   SpO2 99%   BMI 25.37 kg/m²      Physical Exam  Constitutional:       General: She is not in acute distress.     Appearance: She is not diaphoretic.   HENT:      Head: Normocephalic and atraumatic.      Right Ear: External ear normal.      Left Ear: External ear normal.   Eyes:      Conjunctiva/sclera: Conjunctivae normal.      Pupils: Pupils are equal, round, and reactive to light.   Pulmonary:      Effort: Pulmonary effort is normal. No respiratory distress.   Abdominal:      Tenderness: There is abdominal tenderness. There is no right CVA tenderness, left CVA tenderness, guarding or rebound. Negative signs include McBurney's sign.      Comments: Mild diffuse lower abdominal tenderness without rebound or guarding. "   Musculoskeletal:      Cervical back: Normal range of motion.   Skin:     Findings: No rash.   Neurological:      Mental Status: She is alert and oriented to person, place, and time.   Psychiatric:         Mood and Affect: Mood and affect normal.         Cognition and Memory: Memory normal.         Judgment: Judgment normal.             POCT Urinalysis  Lab Results   Component Value Date/Time    POCCOLOR LIGHT YELLOW 05/31/2024 05:00 PM    POCAPPEAR CLEAR 05/31/2024 05:00 PM    POCLEUKEST NEG 05/31/2024 05:00 PM    POCNITRITE NEG 05/31/2024 05:00 PM    POCUROBILIGE 0.2 05/31/2024 05:00 PM    POCPROTEIN NEG 05/31/2024 05:00 PM    POCURPH 6.0 05/31/2024 05:00 PM    POCBLOOD NEG 05/31/2024 05:00 PM    POCSPGRV 1.005 05/31/2024 05:00 PM    POCKETONES NEG 05/31/2024 05:00 PM    POCBILIRUBIN NEG 05/31/2024 05:00 PM    POCGLUCUA NEG 05/31/2024 05:00 PM          Assessment & Plan       1. Lower abdominal pain  - POCT Urinalysis  - nitrofurantoin (MACROBID) 100 MG Cap; Take 1 Capsule by mouth 2 times a day for 5 days.  Dispense: 10 Capsule; Refill: 0    2. Dysuria  - POCT Urinalysis  - nitrofurantoin (MACROBID) 100 MG Cap; Take 1 Capsule by mouth 2 times a day for 5 days.  Dispense: 10 Capsule; Refill: 0  Patient seen in the urgent care for evaluation of lower urinary tract symptoms and lower abdominal discomfort that started this morning.  UA actually does not show any finding suggestive of infection but she is leaving to go out of town in the morning and we will not have a urine culture back in a timely fashion.  Will initiate treatment for possible lower urinary tract infection.  Given the other symptoms that are progressing, however, discussed that I cannot completely rule out possibility this could be something more significant such as developing appendicitis.  She currently does not have any tenderness over McBurney's point but did discuss that if symptoms significantly worsen overnight that she should go to the  nearest emergency department for higher level of care.        Differential Diagnosis, natural history, and supportive care discussed. Return to the Urgent Care or follow up with your PCP if symptoms fail to resolve, or for any new or worsening symptoms. Emergency room precautions discussed. Patient and/or family appears understanding of information.

## 2024-06-11 ENCOUNTER — APPOINTMENT (OUTPATIENT)
Dept: URGENT CARE | Facility: CLINIC | Age: 29
End: 2024-06-11
Payer: COMMERCIAL

## 2024-06-11 ENCOUNTER — HOSPITAL ENCOUNTER (EMERGENCY)
Facility: MEDICAL CENTER | Age: 29
End: 2024-06-11
Attending: EMERGENCY MEDICINE
Payer: COMMERCIAL

## 2024-06-11 ENCOUNTER — APPOINTMENT (OUTPATIENT)
Dept: RADIOLOGY | Facility: MEDICAL CENTER | Age: 29
End: 2024-06-11
Attending: EMERGENCY MEDICINE
Payer: COMMERCIAL

## 2024-06-11 VITALS
HEIGHT: 67 IN | BODY MASS INDEX: 25.11 KG/M2 | HEART RATE: 100 BPM | OXYGEN SATURATION: 91 % | WEIGHT: 160 LBS | TEMPERATURE: 98.7 F | RESPIRATION RATE: 15 BRPM | SYSTOLIC BLOOD PRESSURE: 109 MMHG | DIASTOLIC BLOOD PRESSURE: 56 MMHG

## 2024-06-11 DIAGNOSIS — R50.9 FUO (FEVER OF UNKNOWN ORIGIN): ICD-10-CM

## 2024-06-11 LAB
ALBUMIN SERPL BCP-MCNC: 4.3 G/DL (ref 3.2–4.9)
ALBUMIN/GLOB SERPL: 1.5 G/DL
ALP SERPL-CCNC: 59 U/L (ref 30–99)
ALT SERPL-CCNC: 10 U/L (ref 2–50)
ANION GAP SERPL CALC-SCNC: 11 MMOL/L (ref 7–16)
APPEARANCE UR: CLEAR
AST SERPL-CCNC: 16 U/L (ref 12–45)
BASOPHILS # BLD AUTO: 0.4 % (ref 0–1.8)
BASOPHILS # BLD: 0.04 K/UL (ref 0–0.12)
BILIRUB SERPL-MCNC: 0.2 MG/DL (ref 0.1–1.5)
BILIRUB UR QL STRIP.AUTO: NEGATIVE
BUN SERPL-MCNC: 12 MG/DL (ref 8–22)
CALCIUM ALBUM COR SERPL-MCNC: 9.2 MG/DL (ref 8.5–10.5)
CALCIUM SERPL-MCNC: 9.4 MG/DL (ref 8.5–10.5)
CHLORIDE SERPL-SCNC: 110 MMOL/L (ref 96–112)
CO2 SERPL-SCNC: 21 MMOL/L (ref 20–33)
COLOR UR: YELLOW
CREAT SERPL-MCNC: 0.97 MG/DL (ref 0.5–1.4)
EKG IMPRESSION: NORMAL
EOSINOPHIL # BLD AUTO: 0.01 K/UL (ref 0–0.51)
EOSINOPHIL NFR BLD: 0.1 % (ref 0–6.9)
ERYTHROCYTE [DISTWIDTH] IN BLOOD BY AUTOMATED COUNT: 38.5 FL (ref 35.9–50)
FLUAV RNA SPEC QL NAA+PROBE: NEGATIVE
FLUBV RNA SPEC QL NAA+PROBE: NEGATIVE
GFR SERPLBLD CREATININE-BSD FMLA CKD-EPI: 82 ML/MIN/1.73 M 2
GLOBULIN SER CALC-MCNC: 2.8 G/DL (ref 1.9–3.5)
GLUCOSE SERPL-MCNC: 116 MG/DL (ref 65–99)
GLUCOSE UR STRIP.AUTO-MCNC: NEGATIVE MG/DL
HCT VFR BLD AUTO: 40.7 % (ref 37–47)
HETEROPH AB SER QL: NEGATIVE
HGB BLD-MCNC: 13.9 G/DL (ref 12–16)
IMM GRANULOCYTES # BLD AUTO: 0.05 K/UL (ref 0–0.11)
IMM GRANULOCYTES NFR BLD AUTO: 0.4 % (ref 0–0.9)
KETONES UR STRIP.AUTO-MCNC: NEGATIVE MG/DL
LACTATE SERPL-SCNC: 1 MMOL/L (ref 0.5–2)
LEUKOCYTE ESTERASE UR QL STRIP.AUTO: NEGATIVE
LYMPHOCYTES # BLD AUTO: 1.58 K/UL (ref 1–4.8)
LYMPHOCYTES NFR BLD: 13.9 % (ref 22–41)
MCH RBC QN AUTO: 29.2 PG (ref 27–33)
MCHC RBC AUTO-ENTMCNC: 34.2 G/DL (ref 32.2–35.5)
MCV RBC AUTO: 85.5 FL (ref 81.4–97.8)
MICRO URNS: NORMAL
MONOCYTES # BLD AUTO: 0.66 K/UL (ref 0–0.85)
MONOCYTES NFR BLD AUTO: 5.8 % (ref 0–13.4)
NEUTROPHILS # BLD AUTO: 8.99 K/UL (ref 1.82–7.42)
NEUTROPHILS NFR BLD: 79.4 % (ref 44–72)
NITRITE UR QL STRIP.AUTO: NEGATIVE
NRBC # BLD AUTO: 0 K/UL
NRBC BLD-RTO: 0 /100 WBC (ref 0–0.2)
PH UR STRIP.AUTO: 5.5 [PH] (ref 5–8)
PLATELET # BLD AUTO: 254 K/UL (ref 164–446)
PMV BLD AUTO: 12 FL (ref 9–12.9)
POTASSIUM SERPL-SCNC: 4.1 MMOL/L (ref 3.6–5.5)
PROT SERPL-MCNC: 7.1 G/DL (ref 6–8.2)
PROT UR QL STRIP: NEGATIVE MG/DL
RBC # BLD AUTO: 4.76 M/UL (ref 4.2–5.4)
RBC UR QL AUTO: NEGATIVE
RSV RNA SPEC QL NAA+PROBE: NEGATIVE
S PYO DNA SPEC NAA+PROBE: NOT DETECTED
SARS-COV-2 RNA RESP QL NAA+PROBE: NOTDETECTED
SODIUM SERPL-SCNC: 142 MMOL/L (ref 135–145)
SP GR UR STRIP.AUTO: 1.01
UROBILINOGEN UR STRIP.AUTO-MCNC: 0.2 MG/DL
WBC # BLD AUTO: 11.3 K/UL (ref 4.8–10.8)

## 2024-06-11 PROCEDURE — 81003 URINALYSIS AUTO W/O SCOPE: CPT

## 2024-06-11 PROCEDURE — 87040 BLOOD CULTURE FOR BACTERIA: CPT | Mod: 91

## 2024-06-11 PROCEDURE — 36415 COLL VENOUS BLD VENIPUNCTURE: CPT

## 2024-06-11 PROCEDURE — 99285 EMERGENCY DEPT VISIT HI MDM: CPT

## 2024-06-11 PROCEDURE — 0241U HCHG SARS-COV-2 COVID-19 NFCT DS RESP RNA 4 TRGT ED POC: CPT

## 2024-06-11 PROCEDURE — 85025 COMPLETE CBC W/AUTO DIFF WBC: CPT

## 2024-06-11 PROCEDURE — 700105 HCHG RX REV CODE 258: Performed by: EMERGENCY MEDICINE

## 2024-06-11 PROCEDURE — 96375 TX/PRO/DX INJ NEW DRUG ADDON: CPT

## 2024-06-11 PROCEDURE — 86308 HETEROPHILE ANTIBODY SCREEN: CPT

## 2024-06-11 PROCEDURE — 700111 HCHG RX REV CODE 636 W/ 250 OVERRIDE (IP): Mod: JZ | Performed by: EMERGENCY MEDICINE

## 2024-06-11 PROCEDURE — 83605 ASSAY OF LACTIC ACID: CPT

## 2024-06-11 PROCEDURE — 96374 THER/PROPH/DIAG INJ IV PUSH: CPT

## 2024-06-11 PROCEDURE — 87086 URINE CULTURE/COLONY COUNT: CPT

## 2024-06-11 PROCEDURE — 71045 X-RAY EXAM CHEST 1 VIEW: CPT

## 2024-06-11 PROCEDURE — 93005 ELECTROCARDIOGRAM TRACING: CPT | Performed by: EMERGENCY MEDICINE

## 2024-06-11 PROCEDURE — 80053 COMPREHEN METABOLIC PANEL: CPT

## 2024-06-11 PROCEDURE — 87651 STREP A DNA AMP PROBE: CPT

## 2024-06-11 PROCEDURE — 93005 ELECTROCARDIOGRAM TRACING: CPT

## 2024-06-11 RX ORDER — CEFTRIAXONE 2 G/1
2000 INJECTION, POWDER, FOR SOLUTION INTRAMUSCULAR; INTRAVENOUS ONCE
Status: COMPLETED | OUTPATIENT
Start: 2024-06-11 | End: 2024-06-11

## 2024-06-11 RX ORDER — KETOROLAC TROMETHAMINE 15 MG/ML
15 INJECTION, SOLUTION INTRAMUSCULAR; INTRAVENOUS ONCE
Status: COMPLETED | OUTPATIENT
Start: 2024-06-11 | End: 2024-06-11

## 2024-06-11 RX ORDER — ONDANSETRON 2 MG/ML
4 INJECTION INTRAMUSCULAR; INTRAVENOUS ONCE
Status: COMPLETED | OUTPATIENT
Start: 2024-06-11 | End: 2024-06-11

## 2024-06-11 RX ORDER — HYDROMORPHONE HYDROCHLORIDE 1 MG/ML
0.5 INJECTION, SOLUTION INTRAMUSCULAR; INTRAVENOUS; SUBCUTANEOUS ONCE
Status: COMPLETED | OUTPATIENT
Start: 2024-06-11 | End: 2024-06-11

## 2024-06-11 RX ORDER — KETOROLAC TROMETHAMINE 10 MG/1
10 TABLET, FILM COATED ORAL EVERY 4 HOURS PRN
Qty: 30 TABLET | Refills: 0 | Status: SHIPPED | OUTPATIENT
Start: 2024-06-11 | End: 2024-06-16

## 2024-06-11 RX ORDER — SODIUM CHLORIDE, SODIUM LACTATE, POTASSIUM CHLORIDE, AND CALCIUM CHLORIDE .6; .31; .03; .02 G/100ML; G/100ML; G/100ML; G/100ML
30 INJECTION, SOLUTION INTRAVENOUS ONCE
Status: COMPLETED | OUTPATIENT
Start: 2024-06-11 | End: 2024-06-11

## 2024-06-11 RX ORDER — ONDANSETRON 4 MG/1
4 TABLET, ORALLY DISINTEGRATING ORAL EVERY 6 HOURS PRN
Qty: 10 TABLET | Refills: 0 | Status: SHIPPED | OUTPATIENT
Start: 2024-06-11

## 2024-06-11 RX ADMIN — CEFTRIAXONE SODIUM 2000 MG: 2 INJECTION, POWDER, FOR SOLUTION INTRAMUSCULAR; INTRAVENOUS at 04:15

## 2024-06-11 RX ADMIN — KETOROLAC TROMETHAMINE 15 MG: 15 INJECTION, SOLUTION INTRAMUSCULAR; INTRAVENOUS at 04:23

## 2024-06-11 RX ADMIN — ONDANSETRON 4 MG: 2 INJECTION INTRAMUSCULAR; INTRAVENOUS at 04:18

## 2024-06-11 RX ADMIN — HYDROMORPHONE HYDROCHLORIDE 0.5 MG: 1 INJECTION, SOLUTION INTRAMUSCULAR; INTRAVENOUS; SUBCUTANEOUS at 05:03

## 2024-06-11 RX ADMIN — SODIUM CHLORIDE, POTASSIUM CHLORIDE, SODIUM LACTATE AND CALCIUM CHLORIDE 2178 ML: 600; 310; 30; 20 INJECTION, SOLUTION INTRAVENOUS at 04:22

## 2024-06-11 ASSESSMENT — PAIN DESCRIPTION - PAIN TYPE: TYPE: ACUTE PAIN

## 2024-06-11 ASSESSMENT — FIBROSIS 4 INDEX: FIB4 SCORE: .4642857142857142857

## 2024-06-11 NOTE — ED NOTES
Pt presents to ED for   Chief Complaint   Patient presents with    Flu Like Symptoms     For three days pt reports body aches, chills, nausea, fever, headache       Fever at home not coming down with patient taking tylenol around the clock. Pt is A&Ox4, GCS=15. Pt appears uncomfortable. Roomed to yellow 63 from [facility]. EKG completed. PIV established. Blood specimens obtained and sent to lab. Sepsis score=4. ERP bedside

## 2024-06-11 NOTE — ED NOTES
Pt given d/c instructions, f/u info and RX x 2 with verbal understanding.  VSS at discharge.  PIV d/c'd with tip intact.  Pt ambulatory from the ED w/ steady gait.  All belongings in possession on discharge.  Pt escorted to the lobby by RN.

## 2024-06-11 NOTE — ED PROVIDER NOTES
ED Provider Note    CHIEF COMPLAINT  Chief Complaint   Patient presents with    Flu Like Symptoms     For three days pt reports body aches, chills, nausea, fever, headache       EXTERNAL RECORDS REVIEWED  Outpatient Notes urgent care note 5/31/2024 for lower abdominal pain and dysuria started on Macrobid for UTI    HPI/ROS  LIMITATION TO HISTORY   Select: : None  OUTSIDE HISTORIAN(S):  Family at bedside    Michel Angelo is a 28 y.o. female who presents to the emergency department for 3 days of persistent worsening fatigue, myalgias, fever, chills as well as nausea.  Slight headache.  Also slight sore throat.  Lymph nodes sore both in the front and the back of the neck.  Recent travel to [place].  No known direct sick contacts otherwise.    PAST MEDICAL HISTORY   has a past medical history of Chronic constipation (4/8/2016), Dysmenorrhea (4/8/2016), Hypotension (2/21/2020), NEGATIVE HISTORY OF, and Neuritis- right scalp cutaneous x 5 days (9/22/2016).    SURGICAL HISTORY   has a past surgical history that includes knee arthroscopy (Left, 05/2018); rhinoplasty (03/2019); knee arthroscopy (Right, 8/22/2019); meniscectomy, knee, medial (Right, 8/22/2019); and meniscal repair (Right, 8/22/2019).    FAMILY HISTORY  Family History   Problem Relation Age of Onset    Hyperlipidemia Mother     Hyperlipidemia Father     Cancer Brother 14        adenocarcinoma- lung, carcinoid- appendix    Diabetes Maternal Grandmother     Breast Cancer Maternal Grandmother         postmenopausal    Genitourinary () Problems Other         cousin       SOCIAL HISTORY  Social History     Tobacco Use    Smoking status: Never    Smokeless tobacco: Never   Vaping Use    Vaping status: Never Used   Substance and Sexual Activity    Alcohol use: Not Currently     Comment: occasional    Drug use: Not Currently    Sexual activity: Yes     Partners: Female       CURRENT MEDICATIONS  Home Medications       Reviewed by Amanda Roe R.N.  "(Registered Nurse) on 06/11/24 at 0318  Med List Status: Not Addressed     Medication Last Dose Status   hydrOXYzine HCl (ATARAX) 25 MG Tab  Active                  Audit from Redirected Encounters    **Home medications have not yet been reviewed for this encounter**         ALLERGIES  Allergies   Allergen Reactions    Compazine [Prochlorperazine] Anxiety     \"I felt like I was crawling out of my skin\"    Gluten Meal Diarrhea and Vomiting     Diarrhea      Iodine Contrast [Diagnostic X-Ray Materials] Shortness of Breath     Tight chest    Gluten Meal     Iodine        PHYSICAL EXAM  VITAL SIGNS: /56   Pulse 100   Temp (!) 38.2 °C (100.7 °F) (Oral)   Resp 15   Ht 1.702 m (5' 7\")   Wt 72.6 kg (160 lb)   LMP 05/06/2024   SpO2 91%   BMI 25.06 kg/m²      Pulse ox interpretation: I interpret this pulse ox as normal.  Constitutional: Alert in no apparent distress.  HENT: No signs of trauma, Bilateral external ears normal, Nose normal.   Eyes: Pupils are equal and reactive  Neck: Normal range of motion, No tenderness, Supple  Cardiovascular: Regular rate and rhythm, no murmurs.   Thorax & Lungs: Normal breath sounds, No respiratory distress  Abdomen: Bowel sounds normal, Soft, No tenderness  Skin: Warm, Dry  Musculoskeletal: Good range of motion in all major joints. No tenderness to palpation or major deformities noted.   Neurologic: Alert , Normal motor function, Normal sensory function, No focal deficits noted.   Psychiatric: Affect normal, Judgment normal, Mood normal.         EKG/LABS  Results for orders placed or performed during the hospital encounter of 06/11/24   Lactic Acid   Result Value Ref Range    Lactic Acid 1.0 0.5 - 2.0 mmol/L   CBC with Differential   Result Value Ref Range    WBC 11.3 (H) 4.8 - 10.8 K/uL    RBC 4.76 4.20 - 5.40 M/uL    Hemoglobin 13.9 12.0 - 16.0 g/dL    Hematocrit 40.7 37.0 - 47.0 %    MCV 85.5 81.4 - 97.8 fL    MCH 29.2 27.0 - 33.0 pg    MCHC 34.2 32.2 - 35.5 g/dL    RDW " 38.5 35.9 - 50.0 fL    Platelet Count 254 164 - 446 K/uL    MPV 12.0 9.0 - 12.9 fL    Neutrophils-Polys 79.40 (H) 44.00 - 72.00 %    Lymphocytes 13.90 (L) 22.00 - 41.00 %    Monocytes 5.80 0.00 - 13.40 %    Eosinophils 0.10 0.00 - 6.90 %    Basophils 0.40 0.00 - 1.80 %    Immature Granulocytes 0.40 0.00 - 0.90 %    Nucleated RBC 0.00 0.00 - 0.20 /100 WBC    Neutrophils (Absolute) 8.99 (H) 1.82 - 7.42 K/uL    Lymphs (Absolute) 1.58 1.00 - 4.80 K/uL    Monos (Absolute) 0.66 0.00 - 0.85 K/uL    Eos (Absolute) 0.01 0.00 - 0.51 K/uL    Baso (Absolute) 0.04 0.00 - 0.12 K/uL    Immature Granulocytes (abs) 0.05 0.00 - 0.11 K/uL    NRBC (Absolute) 0.00 K/uL   Complete Metabolic Panel   Result Value Ref Range    Sodium 142 135 - 145 mmol/L    Potassium 4.1 3.6 - 5.5 mmol/L    Chloride 110 96 - 112 mmol/L    Co2 21 20 - 33 mmol/L    Anion Gap 11.0 7.0 - 16.0    Glucose 116 (H) 65 - 99 mg/dL    Bun 12 8 - 22 mg/dL    Creatinine 0.97 0.50 - 1.40 mg/dL    Calcium 9.4 8.5 - 10.5 mg/dL    Correct Calcium 9.2 8.5 - 10.5 mg/dL    AST(SGOT) 16 12 - 45 U/L    ALT(SGPT) 10 2 - 50 U/L    Alkaline Phosphatase 59 30 - 99 U/L    Total Bilirubin 0.2 0.1 - 1.5 mg/dL    Albumin 4.3 3.2 - 4.9 g/dL    Total Protein 7.1 6.0 - 8.2 g/dL    Globulin 2.8 1.9 - 3.5 g/dL    A-G Ratio 1.5 g/dL   Urinalysis    Specimen: Urine   Result Value Ref Range    Color Yellow     Character Clear     Specific Gravity 1.006 <1.035    Ph 5.5 5.0 - 8.0    Glucose Negative Negative mg/dL    Ketones Negative Negative mg/dL    Protein Negative Negative mg/dL    Bilirubin Negative Negative    Urobilinogen, Urine 0.2 Negative    Nitrite Negative Negative    Leukocyte Esterase Negative Negative    Occult Blood Negative Negative    Micro Urine Req see below    MONONUCLEOSIS TEST QUAL   Result Value Ref Range    Heterophile Screen Negative Negative   Group A Strep by PCR    Specimen: Throat   Result Value Ref Range    Group A Strep by PCR Not Detected Not Detected   ESTIMATED  GFR   Result Value Ref Range    GFR (CKD-EPI) 82 >60 mL/min/1.73 m 2   EKG   Result Value Ref Range    Report       [facility] Emergency Dept.    Test Date:  2024  Pt Name:    JOHNY HASSAN            Department: ER  MRN:        [id number]                      Room:       [facility]  Gender:     Female                       Technician: [id number]  :        1995                   Requested By:ER TRIAGE PROTOCOL  Order #:    [id number]                    Reading MD: Italo Benson    Measurements  Intervals                                Axis  Rate:       116                          P:          74  HI:         134                          QRS:        70  QRSD:       81                           T:          51  QT:         307  QTc:        427    Interpretive Statements  Sinus tachycardia  Compared to ECG 08/10/2023 13:49:23  Sinus rhythm no longer present  Electronically Signed On 2024 06:21:29 PDT by Italo Benson     POC CoV-2, FLU A/B, RSV by PCR   Result Value Ref Range    POC Influenza A RNA, PCR Negative Negative    POC Influenza B RNA, PCR Negative Negative    POC RSV, by PCR Negative Negative    POC SARS-CoV-2, PCR NotDetected        I have independently interpreted this EKG    RADIOLOGY/PROCEDURES   I have independently interpreted the diagnostic imaging associated with this visit and am waiting the final reading from the radiologist.   My preliminary interpretation is as follows: No acute consolidative process    Radiologist interpretation:  DX-CHEST-PORTABLE (1 VIEW)   Final Result         1.  No acute cardiopulmonary disease.          COURSE & MEDICAL DECISION MAKING    ASSESSMENT, COURSE AND PLAN  Care Narrative: 28-year-old female presenting to the emergency department with the above presentation.  Febrile illness.  Will complete septic workup.  Will initiate early antibiotics and IV fluids as well as antipyretics and pain medications.    DISPOSITION AND DISCUSSIONS  I have  discussed management of the patient with the following physicians and MICHAEL's: None    Discussion of management with other QHP or appropriate source(s): Pharmacy for medication verification      Escalation of care considered, and ultimately not performed:acute inpatient care management, however at this time, the patient is most appropriate for outpatient management    Barriers to care at this time, including but not limited to:  None .     28-year-old female presenting with above presentation.  Workup as above.  Workup is quite reassuring.  No identifiable infectious source at this time.  Viral syndrome remains possible.  At this point we have discussed additional evaluation to include that of lumbar puncture but will defer which was agreed upon via shared decision making.  Similarly while the patient is complaining some mild headache I do have a very low suspicion for intracranial pathology and will defer from advanced imaging of the head for this reason.  At this point the patient is feeling better after medications and IV fluids as provided.  Will discharge on Toradol and Zofran as well as a diet to follow.  They are to follow-up with outpatient PCP and return here to the ER with any change or worsening.    FINAL DIAGNOSIS  1. FUO (fever of unknown origin)           Electronically signed by: Italo Benson M.D., 6/11/2024 4:03 AM

## 2024-06-11 NOTE — ED NOTES
Pt medicated per MAR. Resting comfortably in [facility]. No other needs at this time. Call light within reach.

## 2024-06-13 LAB
BACTERIA UR CULT: NORMAL
SIGNIFICANT IND 70042: NORMAL
SITE SITE: NORMAL
SOURCE SOURCE: NORMAL

## 2024-10-13 ENCOUNTER — OFFICE VISIT (OUTPATIENT)
Dept: URGENT CARE | Facility: CLINIC | Age: 29
End: 2024-10-13
Payer: COMMERCIAL

## 2024-10-13 VITALS
HEIGHT: 67 IN | RESPIRATION RATE: 20 BRPM | WEIGHT: 162 LBS | OXYGEN SATURATION: 98 % | TEMPERATURE: 98.9 F | BODY MASS INDEX: 25.43 KG/M2 | SYSTOLIC BLOOD PRESSURE: 122 MMHG | DIASTOLIC BLOOD PRESSURE: 80 MMHG | HEART RATE: 89 BPM

## 2024-10-13 DIAGNOSIS — H66.001 NON-RECURRENT ACUTE SUPPURATIVE OTITIS MEDIA OF RIGHT EAR WITHOUT SPONTANEOUS RUPTURE OF TYMPANIC MEMBRANE: ICD-10-CM

## 2024-10-13 PROCEDURE — 3079F DIAST BP 80-89 MM HG: CPT | Performed by: FAMILY MEDICINE

## 2024-10-13 PROCEDURE — 3074F SYST BP LT 130 MM HG: CPT | Performed by: FAMILY MEDICINE

## 2024-10-13 PROCEDURE — 99213 OFFICE O/P EST LOW 20 MIN: CPT | Performed by: FAMILY MEDICINE

## 2024-10-13 ASSESSMENT — FIBROSIS 4 INDEX: FIB4 SCORE: 0.52

## 2024-10-30 ENCOUNTER — NON-PROVIDER VISIT (OUTPATIENT)
Dept: MEDICAL GROUP | Facility: IMAGING CENTER | Age: 29
End: 2024-10-30
Payer: COMMERCIAL

## 2024-10-30 DIAGNOSIS — Z23 NEED FOR VACCINATION: ICD-10-CM

## 2024-11-05 ENCOUNTER — APPOINTMENT (OUTPATIENT)
Dept: MEDICAL GROUP | Facility: IMAGING CENTER | Age: 29
End: 2024-11-05
Payer: COMMERCIAL

## 2024-11-06 ENCOUNTER — APPOINTMENT (OUTPATIENT)
Dept: RADIOLOGY | Facility: MEDICAL CENTER | Age: 29
End: 2024-11-06
Attending: EMERGENCY MEDICINE
Payer: COMMERCIAL

## 2024-11-06 ENCOUNTER — HOSPITAL ENCOUNTER (EMERGENCY)
Facility: MEDICAL CENTER | Age: 29
End: 2024-11-06
Attending: EMERGENCY MEDICINE
Payer: COMMERCIAL

## 2024-11-06 VITALS
RESPIRATION RATE: 18 BRPM | TEMPERATURE: 97 F | WEIGHT: 163.14 LBS | DIASTOLIC BLOOD PRESSURE: 71 MMHG | BODY MASS INDEX: 25.61 KG/M2 | OXYGEN SATURATION: 98 % | SYSTOLIC BLOOD PRESSURE: 131 MMHG | HEART RATE: 85 BPM | HEIGHT: 67 IN

## 2024-11-06 DIAGNOSIS — R10.9 ABDOMINAL PAIN, UNSPECIFIED ABDOMINAL LOCATION: ICD-10-CM

## 2024-11-06 DIAGNOSIS — K29.70 GASTRITIS WITHOUT BLEEDING, UNSPECIFIED CHRONICITY, UNSPECIFIED GASTRITIS TYPE: ICD-10-CM

## 2024-11-06 LAB
ALBUMIN SERPL BCP-MCNC: 4.8 G/DL (ref 3.2–4.9)
ALBUMIN/GLOB SERPL: 1.5 G/DL
ALP SERPL-CCNC: 50 U/L (ref 30–99)
ALT SERPL-CCNC: 26 U/L (ref 2–50)
ANION GAP SERPL CALC-SCNC: 11 MMOL/L (ref 7–16)
APPEARANCE UR: CLEAR
AST SERPL-CCNC: 26 U/L (ref 12–45)
BASOPHILS # BLD AUTO: 0.4 % (ref 0–1.8)
BASOPHILS # BLD: 0.03 K/UL (ref 0–0.12)
BILIRUB SERPL-MCNC: 0.6 MG/DL (ref 0.1–1.5)
BILIRUB UR QL STRIP.AUTO: NEGATIVE
BUN SERPL-MCNC: 14 MG/DL (ref 8–22)
CALCIUM ALBUM COR SERPL-MCNC: 9.1 MG/DL (ref 8.5–10.5)
CALCIUM SERPL-MCNC: 9.7 MG/DL (ref 8.4–10.2)
CHLORIDE SERPL-SCNC: 103 MMOL/L (ref 96–112)
CO2 SERPL-SCNC: 25 MMOL/L (ref 20–33)
COLOR UR: YELLOW
CREAT SERPL-MCNC: 0.85 MG/DL (ref 0.5–1.4)
EOSINOPHIL # BLD AUTO: 0.06 K/UL (ref 0–0.51)
EOSINOPHIL NFR BLD: 0.8 % (ref 0–6.9)
ERYTHROCYTE [DISTWIDTH] IN BLOOD BY AUTOMATED COUNT: 39.1 FL (ref 35.9–50)
GFR SERPLBLD CREATININE-BSD FMLA CKD-EPI: 96 ML/MIN/1.73 M 2
GLOBULIN SER CALC-MCNC: 3.1 G/DL (ref 1.9–3.5)
GLUCOSE SERPL-MCNC: 80 MG/DL (ref 65–99)
GLUCOSE UR STRIP.AUTO-MCNC: NEGATIVE MG/DL
HCG SERPL QL: NEGATIVE
HCT VFR BLD AUTO: 44.1 % (ref 37–47)
HGB BLD-MCNC: 14.8 G/DL (ref 12–16)
IMM GRANULOCYTES # BLD AUTO: 0.02 K/UL (ref 0–0.11)
IMM GRANULOCYTES NFR BLD AUTO: 0.3 % (ref 0–0.9)
KETONES UR STRIP.AUTO-MCNC: NEGATIVE MG/DL
LEUKOCYTE ESTERASE UR QL STRIP.AUTO: NEGATIVE
LIPASE SERPL-CCNC: 40 U/L (ref 11–82)
LYMPHOCYTES # BLD AUTO: 2.23 K/UL (ref 1–4.8)
LYMPHOCYTES NFR BLD: 31.2 % (ref 22–41)
MCH RBC QN AUTO: 29.3 PG (ref 27–33)
MCHC RBC AUTO-ENTMCNC: 33.6 G/DL (ref 32.2–35.5)
MCV RBC AUTO: 87.3 FL (ref 81.4–97.8)
MICRO URNS: NORMAL
MONOCYTES # BLD AUTO: 0.44 K/UL (ref 0–0.85)
MONOCYTES NFR BLD AUTO: 6.2 % (ref 0–13.4)
NEUTROPHILS # BLD AUTO: 4.36 K/UL (ref 1.82–7.42)
NEUTROPHILS NFR BLD: 61.1 % (ref 44–72)
NITRITE UR QL STRIP.AUTO: NEGATIVE
NRBC # BLD AUTO: 0 K/UL
NRBC BLD-RTO: 0 /100 WBC (ref 0–0.2)
PH UR STRIP.AUTO: 7 [PH] (ref 5–8)
PLATELET # BLD AUTO: 290 K/UL (ref 164–446)
PMV BLD AUTO: 11.6 FL (ref 9–12.9)
POTASSIUM SERPL-SCNC: 4.2 MMOL/L (ref 3.6–5.5)
PROT SERPL-MCNC: 7.9 G/DL (ref 6–8.2)
PROT UR QL STRIP: NEGATIVE MG/DL
RBC # BLD AUTO: 5.05 M/UL (ref 4.2–5.4)
RBC UR QL AUTO: NEGATIVE
SODIUM SERPL-SCNC: 139 MMOL/L (ref 135–145)
SP GR UR STRIP.AUTO: 1.02
WBC # BLD AUTO: 7.1 K/UL (ref 4.8–10.8)

## 2024-11-06 PROCEDURE — 81003 URINALYSIS AUTO W/O SCOPE: CPT

## 2024-11-06 PROCEDURE — A9270 NON-COVERED ITEM OR SERVICE: HCPCS | Performed by: EMERGENCY MEDICINE

## 2024-11-06 PROCEDURE — 76705 ECHO EXAM OF ABDOMEN: CPT

## 2024-11-06 PROCEDURE — 84703 CHORIONIC GONADOTROPIN ASSAY: CPT

## 2024-11-06 PROCEDURE — 74019 RADEX ABDOMEN 2 VIEWS: CPT

## 2024-11-06 PROCEDURE — 83690 ASSAY OF LIPASE: CPT

## 2024-11-06 PROCEDURE — 99284 EMERGENCY DEPT VISIT MOD MDM: CPT

## 2024-11-06 PROCEDURE — 80053 COMPREHEN METABOLIC PANEL: CPT

## 2024-11-06 PROCEDURE — 85025 COMPLETE CBC W/AUTO DIFF WBC: CPT

## 2024-11-06 PROCEDURE — 700102 HCHG RX REV CODE 250 W/ 637 OVERRIDE(OP): Performed by: EMERGENCY MEDICINE

## 2024-11-06 PROCEDURE — 36415 COLL VENOUS BLD VENIPUNCTURE: CPT

## 2024-11-06 RX ADMIN — LIDOCAINE HYDROCHLORIDE 30 ML: 20 SOLUTION ORAL; TOPICAL at 14:24

## 2024-11-06 ASSESSMENT — PAIN DESCRIPTION - PAIN TYPE: TYPE: ACUTE PAIN

## 2024-11-06 ASSESSMENT — FIBROSIS 4 INDEX: FIB4 SCORE: 0.54

## 2024-11-06 NOTE — ED PROVIDER NOTES
"ED Provider Note    CHIEF COMPLAINT  Chief Complaint   Patient presents with    Abdominal Pain     X5 days RUQ and R flank  pain radiating up and also down to RLQ . Pain has now become constant and is 5/10 \"sharp, burning\" Denies fevers, N/V, or urinary symptoms. . Small amt diarrhea last night, now has a feeling of constipation.        EXTERNAL RECORDS REVIEWED      HPI/ROS  LIMITATION TO HISTORY     OUTSIDE HISTORIAN(S):      Michel Angelo is a 29 y.o. female who presents to the emergency department chief complaint of right upper quadrant abdominal pain.  Occasional radiation to the flank and upper back and also into the right lower quadrant.  Patient reports that she has been very stressed out recently and has been intermittently having this pain over the last couple weeks however worse over the last 2 days.  She does report that it seems to come on after eating occasionally.  She is concerned that it may be an association to greasy foods.  She has had no fevers no chills she has had nausea without vomiting.  She reports a small amount of diarrhea last night and now is feeling constipated.  No urinary symptoms no vaginal bleeding or discharge no chance of pregnancy at this time no other acute symptom change or concern.    PAST MEDICAL HISTORY   has a past medical history of Chronic constipation (4/8/2016), Dysmenorrhea (4/8/2016), Hypotension (2/21/2020), NEGATIVE HISTORY OF, and Neuritis- right scalp cutaneous x 5 days (9/22/2016).    SURGICAL HISTORY   has a past surgical history that includes knee arthroscopy (Left, 05/2018); rhinoplasty (03/2019); knee arthroscopy (Right, 8/22/2019); meniscectomy, knee, medial (Right, 8/22/2019); and meniscal repair (Right, 8/22/2019).    FAMILY HISTORY  Family History   Problem Relation Age of Onset    Hyperlipidemia Mother     Hyperlipidemia Father     Cancer Brother 14        adenocarcinoma- lung, carcinoid- appendix    Diabetes Maternal Grandmother     Breast " "Cancer Maternal Grandmother         postmenopausal    Genitourinary () Problems Other         cousin       SOCIAL HISTORY  Social History     Tobacco Use    Smoking status: Never    Smokeless tobacco: Never   Vaping Use    Vaping status: Never Used   Substance and Sexual Activity    Alcohol use: Not Currently     Comment: occasional    Drug use: Not Currently    Sexual activity: Yes     Partners: Female       CURRENT MEDICATIONS  Home Medications       Reviewed by Maria Esther Nicole R.N. (Registered Nurse) on 11/06/24 at 1134  Med List Status: Not Addressed     Medication Last Dose Status   hydrOXYzine HCl (ATARAX) 25 MG Tab  Active                    ALLERGIES  Allergies   Allergen Reactions    Compazine [Prochlorperazine] Anxiety     \"I felt like I was crawling out of my skin\"    Gluten Meal Diarrhea and Vomiting     Diarrhea      Iodine Contrast [Diagnostic X-Ray Materials] Shortness of Breath     Tight chest    Gluten Meal     Iodine        PHYSICAL EXAM  VITAL SIGNS: /70   Pulse 85   Temp 36 °C (96.8 °F) (Temporal)   Resp 18   Ht 1.702 m (5' 7\")   Wt 74 kg (163 lb 2.3 oz)   LMP  (Within Weeks)   SpO2 98%   BMI 25.55 kg/m²      Pulse ox interpretation: I interpret this pulse ox as normal.  Constitutional: Alert and oriented x 3, minimal distress  HEENT: Atraumatic normocephalic, pupils are equal round reactive to light extraocular movements are intact. The nares is clear, external ears are normal, mouth shows moist mucous membranes normal dentition for age  Neck: Supple, no JVD no tracheal deviation  Cardiovascular: Regular rate and rhythm   Thorax & Lungs: No respiratory distress    GI: Minor tenderness to palpation in the right upper quadrant no rebound or guarding positive bowel sounds nondistended    Skin: Warm dry no acute rash or lesion  Musculoskeletal: Moving all extremities with full range and 5 of 5 strength no acute  deformity  Neurologic: Cranial nerves III through XII are grossly " intact no sensory deficit no cerebellar dysfunction   Psychiatric: Appropriate affect for situation at this time      EKG/LABS  Results for orders placed or performed during the hospital encounter of 11/06/24   URINALYSIS,CULTURE IF INDICATED    Collection Time: 11/06/24 11:48 AM    Specimen: Urine   Result Value Ref Range    Color Yellow     Character Clear     Specific Gravity 1.020 <1.035    Ph 7.0 5.0 - 8.0    Glucose Negative Negative mg/dL    Ketones Negative Negative mg/dL    Protein Negative Negative mg/dL    Bilirubin Negative Negative    Nitrite Negative Negative    Leukocyte Esterase Negative Negative    Occult Blood Negative Negative    Micro Urine Req see below    CBC WITH DIFFERENTIAL    Collection Time: 11/06/24 12:06 PM   Result Value Ref Range    WBC 7.1 4.8 - 10.8 K/uL    RBC 5.05 4.20 - 5.40 M/uL    Hemoglobin 14.8 12.0 - 16.0 g/dL    Hematocrit 44.1 37.0 - 47.0 %    MCV 87.3 81.4 - 97.8 fL    MCH 29.3 27.0 - 33.0 pg    MCHC 33.6 32.2 - 35.5 g/dL    RDW 39.1 35.9 - 50.0 fL    Platelet Count 290 164 - 446 K/uL    MPV 11.6 9.0 - 12.9 fL    Neutrophils-Polys 61.10 44.00 - 72.00 %    Lymphocytes 31.20 22.00 - 41.00 %    Monocytes 6.20 0.00 - 13.40 %    Eosinophils 0.80 0.00 - 6.90 %    Basophils 0.40 0.00 - 1.80 %    Immature Granulocytes 0.30 0.00 - 0.90 %    Nucleated RBC 0.00 0.00 - 0.20 /100 WBC    Neutrophils (Absolute) 4.36 1.82 - 7.42 K/uL    Lymphs (Absolute) 2.23 1.00 - 4.80 K/uL    Monos (Absolute) 0.44 0.00 - 0.85 K/uL    Eos (Absolute) 0.06 0.00 - 0.51 K/uL    Baso (Absolute) 0.03 0.00 - 0.12 K/uL    Immature Granulocytes (abs) 0.02 0.00 - 0.11 K/uL    NRBC (Absolute) 0.00 K/uL   COMP METABOLIC PANEL    Collection Time: 11/06/24 12:06 PM   Result Value Ref Range    Sodium 139 135 - 145 mmol/L    Potassium 4.2 3.6 - 5.5 mmol/L    Chloride 103 96 - 112 mmol/L    Co2 25 20 - 33 mmol/L    Anion Gap 11.0 7.0 - 16.0    Glucose 80 65 - 99 mg/dL    Bun 14 8 - 22 mg/dL    Creatinine 0.85 0.50 -  1.40 mg/dL    Calcium 9.7 8.4 - 10.2 mg/dL    Correct Calcium 9.1 8.5 - 10.5 mg/dL    AST(SGOT) 26 12 - 45 U/L    ALT(SGPT) 26 2 - 50 U/L    Alkaline Phosphatase 50 30 - 99 U/L    Total Bilirubin 0.6 0.1 - 1.5 mg/dL    Albumin 4.8 3.2 - 4.9 g/dL    Total Protein 7.9 6.0 - 8.2 g/dL    Globulin 3.1 1.9 - 3.5 g/dL    A-G Ratio 1.5 g/dL   LIPASE    Collection Time: 11/06/24 12:06 PM   Result Value Ref Range    Lipase 40 11 - 82 U/L   HCG QUAL SERUM    Collection Time: 11/06/24 12:06 PM   Result Value Ref Range    Beta-Hcg Qualitative Serum Negative Negative   ESTIMATED GFR    Collection Time: 11/06/24 12:06 PM   Result Value Ref Range    GFR (CKD-EPI) 96 >60 mL/min/1.73 m 2     *Note: Due to a large number of results and/or encounters for the requested time period, some results have not been displayed. A complete set of results can be found in Results Review.       I have independently interpreted this EKG    RADIOLOGY/PROCEDURES   I have independently interpreted the diagnostic imaging associated with this visit and am waiting the final reading from the radiologist.   My preliminary interpretation is as follows:   Nonobstructive bowel gas pattern    Radiologist interpretation:  [id number] [id number]   Final Result      No evidence of bowel obstruction.      US-RUQ   Final Result      No evidence of gallstone or evidence of biliary ductal dilatation.          COURSE & MEDICAL DECISION MAKING    ASSESSMENT, COURSE AND PLAN  Care Narrative: Very pleasant 29-year-old female presents with escalating right upper quadrant abdominal pain over the last couple weeks.History and physical was somewhat suspicious for possible acute cholecystitis at arrival however workup is unrevealing for this.  Patient has been under a large amount of stress recently.  Discussed possible gastritis versus PUD.  Patient given a dose of GI cocktail here and feels much better after this intervention.  Patient will be placed on p.o. PPI.  She is given  "instruction omeprazole p.o. twice daily for 1 week and then reduce to daily.  She knows to return here should she experiencing worsening pain blood in emesis blood in stool fevers chills any other acute symptom change or concern she is otherwise discharged stable and improved condition.      ADDITIONAL PROBLEMS MANAGED    DISPOSITION AND DISCUSSIONS    I have discussed management of the patient with the following physicians and MICHAEL's:      Discussion of management with other QHP or appropriate source(s):      Escalation of care considered, and ultimately not performed:    Barriers to care at this time, including but not limited to: .     Decision tools and prescription drugs considered including, but not limited to: .  /71   Pulse 85   Temp 36.1 °C (97 °F)   Resp 18   Ht 1.702 m (5' 7\")   Wt 74 kg (163 lb 2.3 oz)   LMP  (Within Weeks)   SpO2 98%   BMI 25.55 kg/m²     Estefany Ramírez P.A.-C.  [address] [place] [address] [phone]          [facility], Emergency Dept  [address]  [facility] [address] [phone]    in 12-24 hours if symptoms persist, immediately If symptoms worsen, or if you develop any other symptoms or concerns          FINAL DIAGNOSIS  1. Abdominal pain, unspecified abdominal location    2. Gastritis without bleeding, unspecified chronicity, unspecified gastritis type         Electronically signed by: Sergio Feldman M.D.      "

## 2024-11-06 NOTE — ED TRIAGE NOTES
"Chief Complaint   Patient presents with    Abdominal Pain     X5 days RUQ and R flank  pain radiating up and also down to RLQ . Pain has now become constant and is 5/10 \"sharp, burning\" Denies fevers, N/V, or urinary symptoms. . Small amt diarrhea last night, now has a feeling of constipation.      /70   Pulse 85   Temp 36 °C (96.8 °F) (Temporal)   Resp 18   Ht 1.702 m (5' 7\")   Wt 74 kg (163 lb 2.3 oz)   LMP  (Within Weeks)   SpO2 98%   BMI 25.55 kg/m²     "

## 2024-11-06 NOTE — ED NOTES
Reports improved pain after GI cocktail. Patient verbalized understanding to plan of care and discharge information.  Patient in stable condition. Patient ambulated out of ED to person vehicle with stable gait.

## 2024-11-06 NOTE — ED NOTES
Pt ambulates to triage with complaint of right sided abdominal pain x1 week that became worse yesterday. Denies N/V/D. Pt states urge to bowel but is unable to

## 2024-12-03 ENCOUNTER — APPOINTMENT (OUTPATIENT)
Dept: MEDICAL GROUP | Facility: IMAGING CENTER | Age: 29
End: 2024-12-03
Payer: COMMERCIAL

## 2024-12-03 VITALS
BODY MASS INDEX: 24.8 KG/M2 | HEIGHT: 67 IN | WEIGHT: 158 LBS | SYSTOLIC BLOOD PRESSURE: 116 MMHG | HEART RATE: 85 BPM | RESPIRATION RATE: 16 BRPM | DIASTOLIC BLOOD PRESSURE: 68 MMHG | OXYGEN SATURATION: 97 % | TEMPERATURE: 98.1 F

## 2024-12-03 DIAGNOSIS — Z01.419 ENCOUNTER FOR GYNECOLOGICAL EXAMINATION: ICD-10-CM

## 2024-12-03 DIAGNOSIS — E23.7 PITUITARY ABNORMALITY (HCC): ICD-10-CM

## 2024-12-03 DIAGNOSIS — Z13.220 SCREENING CHOLESTEROL LEVEL: ICD-10-CM

## 2024-12-03 DIAGNOSIS — R79.89 ABNORMAL THYROID BLOOD TEST: ICD-10-CM

## 2024-12-03 DIAGNOSIS — Z00.00 WELLNESS EXAMINATION: ICD-10-CM

## 2024-12-03 DIAGNOSIS — Z12.4 SCREENING FOR CERVICAL CANCER: ICD-10-CM

## 2024-12-03 DIAGNOSIS — Z11.1 TUBERCULOSIS SCREENING: ICD-10-CM

## 2024-12-03 DIAGNOSIS — Z11.51 SCREENING FOR HPV (HUMAN PAPILLOMAVIRUS): ICD-10-CM

## 2024-12-03 DIAGNOSIS — Z12.83 SKIN CANCER SCREENING: ICD-10-CM

## 2024-12-03 DIAGNOSIS — Z13.21 ENCOUNTER FOR VITAMIN DEFICIENCY SCREENING: ICD-10-CM

## 2024-12-03 DIAGNOSIS — Z11.3 SCREENING EXAMINATION FOR SEXUALLY TRANSMITTED DISEASE: ICD-10-CM

## 2024-12-03 DIAGNOSIS — R10.11 POSTPRANDIAL RUQ PAIN: ICD-10-CM

## 2024-12-03 DIAGNOSIS — R76.8 EBV SEROPOSITIVITY: ICD-10-CM

## 2024-12-03 PROCEDURE — 99459 PELVIC EXAMINATION: CPT | Performed by: PHYSICIAN ASSISTANT

## 2024-12-03 PROCEDURE — 3074F SYST BP LT 130 MM HG: CPT | Performed by: PHYSICIAN ASSISTANT

## 2024-12-03 PROCEDURE — 3078F DIAST BP <80 MM HG: CPT | Performed by: PHYSICIAN ASSISTANT

## 2024-12-03 PROCEDURE — 99395 PREV VISIT EST AGE 18-39: CPT | Performed by: PHYSICIAN ASSISTANT

## 2024-12-03 ASSESSMENT — FIBROSIS 4 INDEX: FIB4 SCORE: 0.47

## 2024-12-03 NOTE — PATIENT INSTRUCTIONS
It was a pleasure meeting with you today at [facility]!    Your medical history/records and medications were reviewed today.     UPDATE on MyChart Results: If you have blood work, and/or imaging studies, or any other test or procedure completed, you will have access to results as soon as they become available in MyChart. Recently, these results will be available for review at the same time that your provider is able to see results!    This will likely mean you will see a result before your provider has had a chance to review and discuss with you.  Some results or care notes may be hard to understand and may be serious in nature.    We look at every result and your provider will contact you to explain what they mean and discuss appropriate next steps. Please allow for at least 72 business hours for chart and result review.     We prefer that you wait for your care team to contact you with your results.  Often, your provider will discuss your results with you at your next appointment. We look forward to continuing to partner with you in your care.    Please review my practice information below:    If you have any prescription refill requests, please send them via [employer] or discuss with your provider at the start of your office visits. Please allow 3-5 business days for lab and testing review and you will be contacted via [employer] with those results, or if advised to make a follow up appointment regarding those results, then please do so.     Once resulted, your lab/test/imaging results will show up automatically in your MyChart. Please wait for my interpretation and recommendations prior to viewing your results to avoid any unnecessary confusion or misinterpretation. I will address all of the lab values that I interpret as abnormal and message you accordingly on your MyChart. I will always send you a message about your results even if they are normal. If you do not hear back from me within 5-7 business  days after completing your tests, then please send me a message on [employer] so I can obtain your results (especially if you went to an outside lab or imaging center - [facility], [facility], etc).     If you have any additional questions or concerns beyond my interpretation of your results, please make an appointment with me to discuss in further detail.    Please only use the [employer] messaging system for questions regarding your most recent appointment or if advised to use otherwise (glucose or blood pressure reporting).     If you have any new problems or concerns, you must make an appointment to discuss. This includes any referral requests, lab requests (unless advised to notify me for pre-appt labs), medication side effects, or request for medication adjustments.     Please arrive 15 minutes prior to your appointment time to complete your check-in and intake with the medical assistant.      Thank you,    Estefany Ramírez PA-C ([place])  Physician Assistant Certified  [facility]    -----------------------------------------------------------------    Attn: Patients of [facility]:    In an effort to continue to provide excellent and efficient care to our patients, it is vital that we continue to use our resources appropriately. With that, this is a reminder that [employer] is used for prescription refill requests, test results, virtual visits, and chart review only.     Any new questions, concerns/conditions, lab/imaging requests, medication adjustments, new prescriptions, or referral requests do require an appointment (virtually or in person), unless discussed otherwise at your most recent appointment.     Thank you for your understanding,    [facility]

## 2024-12-03 NOTE — PROGRESS NOTES
Subjective:     CC:   Chief Complaint   Patient presents with    Gynecologic Exam    Requesting Labs     STD panel       HPI:   Michel Angelo is a 29 y.o. patient who presents for annual gynecological exam.  She was recently evaluated in the emergency department due to right upper quadrant pain.  Symptoms are intermittent and postprandial.  Abdominal ultrasound, abdominal x-ray, labs can back within normal limits.  No current symptoms.  No nausea or vomiting.    Ob-Gyn/ History:    Patient has GYN provider: no  /Para:   Last Pap Smear:    H/O of abnormal pap smears: no  Current Contraceptive Method: no   Sexually active: yes, female partners  No LMP recorded.   Periods: regular, 28 days, bleeds for 4 days  Cramping: intermittent  Folate intake: not currently     Health Maintenance/Anticipatory Guidance:  Diet:   - 2 meals per day   - 2 servings of fruits/veggies per day   - 64+ ounces of water   - 3-4 cups of caffeine   - 0 days per week eating out  Exercise:    - Cardio: walking   - Weight/resistance training: daily   - Other activities: volleyball  Substance Abuse: no history  Safe in Relationships: yes  Sun Protection/Sunscreen: yes   Dermatologist: due for full body skin check  Dentist: due for twice yearly appointments  Eye Doctor: deferred  Seatbelts, helmets, and gun safety discussed    Infectious Disease Screening:  STI/HIV screening: will update    Immunizations: up to date      has a past medical history of Chronic constipation (2016), Dysmenorrhea (2016), Hypotension (2020), NEGATIVE HISTORY OF, and Neuritis- right scalp cutaneous x 5 days (2016).   has a past surgical history that includes knee arthroscopy (Left, 2018); rhinoplasty (2019); knee arthroscopy (Right, 2019); meniscectomy, knee, medial (Right, 2019); and meniscal repair (Right, 2019).  Family History   Problem Relation Age of Onset    Cancer Mother         CLL    Hyperlipidemia  Mother     Hyperlipidemia Father     Cancer Brother 14        adenocarcinoma- lung, carcinoid- appendix    Diabetes Maternal Grandmother     Breast Cancer Maternal Grandmother         postmenopausal    Genitourinary () Problems Other         cousin     Social History     Socioeconomic History    Marital status: Single     Spouse name: Not on file    Number of children: Not on file    Years of education: Not on file    Highest education level: Not on file   Occupational History    Occupation: [employer]- 10th grade     Employer: [employer]   Tobacco Use    Smoking status: Never    Smokeless tobacco: Never   Vaping Use    Vaping status: Never Used   Substance and Sexual Activity    Alcohol use: Not Currently     Comment: occasional    Drug use: Not Currently    Sexual activity: Yes     Partners: Female   Other Topics Concern    Not on file   Social History Narrative    Born in [place]    Raised in [place]    Has a female partner     Social Drivers of Health     Financial Resource Strain: Not on file   Food Insecurity: Not on file   Transportation Needs: Not on file   Physical Activity: Not on file   Stress: Not on file   Social Connections: Not on file   Intimate Partner Violence: Not on file   Housing Stability: Not on file     Social History     Social History Narrative    Born in [place]    Raised in [place]    Has a female partner     Patient Active Problem List    Diagnosis Date Noted    Postprandial RUQ pain 12/03/2024    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    Abnormal thyroid blood test 11/30/2023    Irregular bowel habits 10/19/2023    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 60 Capsule 0    hydrOXYzine HCl (ATARAX) 25 MG Tab Take 1 Tablet by mouth 3 times a day as needed for Anxiety. 30 Tablet 1     No current facility-administered medications for this visit.     Allergies   Allergen Reactions    Compazine [Prochlorperazine] Anxiety     \"I felt like I was crawling out of my skin\"    Gluten Meal Diarrhea and Vomiting     Diarrhea      Iodine Contrast [Diagnostic X-Ray Materials] Shortness of Breath     Tight chest    Gluten Meal     Iodine        Review of Systems   Constitutional: Negative for fever, chills and malaise/fatigue.    Respiratory: Negative for cough and shortness of breath.  Cardiovascular: Negative for chest pain, palpitations, or leg swelling.   Gastrointestinal: Negative for nausea, vomiting, abdominal pain and diarrhea.   Genitourinary: Negative for dysuria and hematuria.   Skin: Negative for rash.    Psychiatric/Behavioral: Negative for depression.      Objective:     /68 (BP Location: Left arm, Patient Position: Sitting, BP Cuff Size: Adult)   Pulse 85   Temp 36.7 °C (98.1 °F) (Temporal)   Resp 16   Ht 1.702 m (5' 7\")   Wt 71.7 kg (158 lb)   SpO2 97%   BMI 24.75 kg/m²   Body mass index is 24.75 kg/m².  Wt Readings from Last 4 Encounters:   12/03/24 71.7 kg (158 lb)   11/06/24 74 kg (163 lb 2.3 oz)   10/13/24 73.5 kg (162 lb)   07/22/24 74.8 kg (165 lb)       Physical Exam:  Constitutional: Well-developed and well-nourished. Not diaphoretic. No distress.   Skin: Skin is warm and dry. No rash noted.  Head: Atraumatic without lesions.  Neck: Supple, trachea midline. Normal range of motion. No thyromegaly present. No lymphadenopathy--cervical or supraclavicular.  Cardiovascular: Regular rate and rhythm, S1 and S2 without murmur, rubs, or gallops.  Lungs: Normal " inspiratory effort, CTA bilaterally, no wheezes/rhonchi/rales    Breast: mastectomy - no palpable masses  Abdomen: Soft, non tender, and without distention. Active bowel sounds in all four quadrants. No rebound, guarding, masses or HSM.    :Perineum and external genitalia normal without rash.   Vagina with normal and physiologic discharge.   Cervix without visible lesions or discharge.   Bimanual exam without adnexal masses or cervical motion tenderness.    Extremities: No cyanosis, clubbing, erythema, nor edema. Distal pulses intact and symmetric.     A chaperone was offered to the patient during today's exam. Chaperone name: Sarah Beth HIGHTOWER was present.    Assessment and Plan:     1. Wellness examination  PMH/PSH/FH/Social history reviewed.  Vaccinations discussed.  Previous records and labs reviewed. Discussed age appropriate anticipatory guidance.    2. Screening for cervical cancer  - Thinprep Pap with HPV; Future    3. Encounter for gynecological examination  - Thinprep Pap with HPV; Future    4. Screening for HPV (human papillomavirus)  - Thinprep Pap with HPV; Future    5. EBV seropositivity  - EBV ACUTE INFECTION AB PANEL; Future    6. Abnormal thyroid blood test  - ANTI-THYROID ANTIBODIES; Future  - T3 FREE; Future  - FREE THYROXINE; Future  - TSH; Future    7. Postprandial RUQ pain  Possible biliary dyskinesia.  If symptoms recur, will have patient schedule HIDA scan and general surgery consult.  - NM-BILIARY (HIDA) SCAN WITH CCK; Future  - Referral to General Surgery  - GAMMA GT (GGT); Future    8. Pituitary abnormality (HCC)  History of low ACTH and abnormal FT3, previous endocrinology notes reviewed.  Will check updated labs.  - ACTH; Future  - CORTISOL; Future  - DHEA SULFATE; Future    9. Encounter for vitamin deficiency screening  - VITAMIN B12; Future  - VITAMIN D,25 HYDROXY (DEFICIENCY); Future    10. Screening cholesterol level  - Lipid Profile; Future    11. Screening examination for sexually  transmitted disease  - HIV AG/AB COMBO ASSAY SCREENING; Future  - HSV 1/2 IGG W/ TYPE SPECIFIC RFLX; Future  - T.PALLIDUM AB SHAHID (SCREENING); Future  - VAGINAL PATHOGENS DNA PANEL; Future  - Chlamydia/GC, PCR (Genital/Anal swab); Future    12. Skin cancer screening  - Referral to Dermatology    13. Tuberculosis screening  - Quantiferon Gold TB (PPD); Future -needs order for PA school      Follow-up: Return for Will notify patient to follow-up pending tests.    Estefany Ramírez PA-C ([place])  Physician Assistant Certified  [facility]

## 2024-12-17 ENCOUNTER — APPOINTMENT (OUTPATIENT)
Dept: MEDICAL GROUP | Facility: IMAGING CENTER | Age: 29
End: 2024-12-17
Payer: COMMERCIAL

## (undated) DEVICE — ARTHROWAND TURBOVAC 3.5/90 SCT

## (undated) DEVICE — BANDAGE ELASTIC 6 IN X 5 YDS - LATEX FREE (10/BX)

## (undated) DEVICE — KIT ROOM DECONTAMINATION

## (undated) DEVICE — ELECTRODE DUAL RETURN W/ CORD - (50/PK)

## (undated) DEVICE — DRAPE LARGE 3 QUARTER - (20/CA)

## (undated) DEVICE — SUTURE 3-0 PROLENE PS-1 (12PK/BX)

## (undated) DEVICE — PACK KNEE ARTHROSCOPY SM OR - (2EA/CA)

## (undated) DEVICE — TUBING, SPIROMETRY KIT

## (undated) DEVICE — NEPTUNE 4 PORT MANIFOLD - (20/PK)

## (undated) DEVICE — GLOVE, LITE (PAIR)

## (undated) DEVICE — STERI STRIP COMPOUND BENZOIN - TINCTURE 0.6ML WITH APPLICATOR (40EA/BX)

## (undated) DEVICE — DRESSING XEROFORM 1X8 - (50/BX 4BX/CA)

## (undated) DEVICE — PROTECTOR ULNA NERVE - (36PR/CA)

## (undated) DEVICE — BLADE SHAVER AGGRESSIVE PLUS 4.0MM ANGLED (5EA/BX)

## (undated) DEVICE — SENSOR SPO2 NEO LNCS ADHESIVE (20/BX) SEE USER NOTES

## (undated) DEVICE — GLOVE BIOGEL INDICATOR SZ 8 SURGICAL PF LTX - (50/BX 4BX/CA)

## (undated) DEVICE — SODIUM CHL. IRRIGATION 0.9% 3000ML (4EA/CA 65CA/PF)

## (undated) DEVICE — GLOVE BIOGEL SZ 8 SURGICAL PF LTX - (50PR/BX 4BX/CA)

## (undated) DEVICE — PAD PREP 24 X 48 CUFFED - (100/CA)

## (undated) DEVICE — CLOSURE SKIN STRIP 1/2 X 4 IN - (STERI STRIP) (50/BX 4BX/CA)

## (undated) DEVICE — DRAPE LOWER EXTREMETY - (6/CA)

## (undated) DEVICE — SUTURE GENERAL

## (undated) DEVICE — PADDING CAST 6 IN STERILE - 6 X 4 YDS (24/CA)